# Patient Record
Sex: MALE | Race: WHITE | NOT HISPANIC OR LATINO | Employment: OTHER | URBAN - METROPOLITAN AREA
[De-identification: names, ages, dates, MRNs, and addresses within clinical notes are randomized per-mention and may not be internally consistent; named-entity substitution may affect disease eponyms.]

---

## 2017-02-08 ENCOUNTER — PATIENT MESSAGE (OUTPATIENT)
Dept: PULMONOLOGY | Facility: CLINIC | Age: 67
End: 2017-02-08

## 2017-04-08 RX ORDER — SIMVASTATIN 40 MG/1
TABLET, FILM COATED ORAL
Qty: 90 TABLET | Refills: 2 | Status: SHIPPED | OUTPATIENT
Start: 2017-04-08 | End: 2018-03-15 | Stop reason: SDUPTHER

## 2017-04-30 DIAGNOSIS — N52.1 ERECTILE DYSFUNCTION DUE TO DISEASES CLASSIFIED ELSEWHERE: ICD-10-CM

## 2017-04-30 RX ORDER — TADALAFIL 5 MG/1
TABLET, FILM COATED ORAL
Qty: 24 TABLET | Refills: 2 | Status: SHIPPED | OUTPATIENT
Start: 2017-04-30 | End: 2018-03-15 | Stop reason: SDUPTHER

## 2017-05-23 DIAGNOSIS — Z00.00 ROUTINE GENERAL MEDICAL EXAMINATION AT A HEALTH CARE FACILITY: Primary | ICD-10-CM

## 2017-06-07 ENCOUNTER — CLINICAL SUPPORT (OUTPATIENT)
Dept: INTERNAL MEDICINE | Facility: CLINIC | Age: 67
End: 2017-06-07
Payer: COMMERCIAL

## 2017-06-07 ENCOUNTER — CLINICAL SUPPORT (OUTPATIENT)
Dept: INFECTIOUS DISEASES | Facility: CLINIC | Age: 67
End: 2017-06-07
Payer: COMMERCIAL

## 2017-06-07 ENCOUNTER — OFFICE VISIT (OUTPATIENT)
Dept: PULMONOLOGY | Facility: CLINIC | Age: 67
End: 2017-06-07
Payer: COMMERCIAL

## 2017-06-07 ENCOUNTER — HOSPITAL ENCOUNTER (OUTPATIENT)
Dept: CARDIOLOGY | Facility: CLINIC | Age: 67
Discharge: HOME | End: 2017-06-07
Payer: COMMERCIAL

## 2017-06-07 VITALS
WEIGHT: 256 LBS | SYSTOLIC BLOOD PRESSURE: 127 MMHG | DIASTOLIC BLOOD PRESSURE: 80 MMHG | HEART RATE: 76 BPM | HEIGHT: 70 IN | BODY MASS INDEX: 36.65 KG/M2

## 2017-06-07 DIAGNOSIS — Z00.00 ROUTINE GENERAL MEDICAL EXAMINATION AT A HEALTH CARE FACILITY: Primary | ICD-10-CM

## 2017-06-07 DIAGNOSIS — Z00.00 ROUTINE GENERAL MEDICAL EXAMINATION AT A HEALTH CARE FACILITY: ICD-10-CM

## 2017-06-07 DIAGNOSIS — Z00.00 ANNUAL PHYSICAL EXAM: Primary | ICD-10-CM

## 2017-06-07 LAB
ALBUMIN SERPL BCP-MCNC: 4.1 G/DL
ALP SERPL-CCNC: 72 U/L
ALT SERPL W/O P-5'-P-CCNC: 22 U/L
ANION GAP SERPL CALC-SCNC: 13 MMOL/L
AST SERPL-CCNC: 18 U/L
BILIRUB SERPL-MCNC: 0.5 MG/DL
BUN SERPL-MCNC: 15 MG/DL
CALCIUM SERPL-MCNC: 10.3 MG/DL
CHLORIDE SERPL-SCNC: 103 MMOL/L
CHOLEST/HDLC SERPL: 3.5 {RATIO}
CO2 SERPL-SCNC: 30 MMOL/L
COMPLEXED PSA SERPL-MCNC: <0.01 NG/ML
CREAT SERPL-MCNC: 1.2 MG/DL
ERYTHROCYTE [DISTWIDTH] IN BLOOD BY AUTOMATED COUNT: 12.6 %
EST. GFR  (AFRICAN AMERICAN): >60 ML/MIN/1.73 M^2
EST. GFR  (NON AFRICAN AMERICAN): >60 ML/MIN/1.73 M^2
ESTIMATED AVG GLUCOSE: 111 MG/DL
GLUCOSE SERPL-MCNC: 118 MG/DL
HBA1C MFR BLD HPLC: 5.5 %
HCT VFR BLD AUTO: 46.2 %
HDL/CHOLESTEROL RATIO: 28.4 %
HDLC SERPL-MCNC: 190 MG/DL
HDLC SERPL-MCNC: 54 MG/DL
HGB BLD-MCNC: 15.4 G/DL
LDLC SERPL CALC-MCNC: 111.2 MG/DL
MCH RBC QN AUTO: 31.4 PG
MCHC RBC AUTO-ENTMCNC: 33.3 %
MCV RBC AUTO: 94 FL
NONHDLC SERPL-MCNC: 136 MG/DL
PLATELET # BLD AUTO: 243 K/UL
PMV BLD AUTO: 10.1 FL
POTASSIUM SERPL-SCNC: 3.9 MMOL/L
PROT SERPL-MCNC: 7.6 G/DL
RBC # BLD AUTO: 4.91 M/UL
SODIUM SERPL-SCNC: 146 MMOL/L
TRIGL SERPL-MCNC: 124 MG/DL
TSH SERPL DL<=0.005 MIU/L-ACNC: 1.92 UIU/ML
WBC # BLD AUTO: 9.23 K/UL

## 2017-06-07 PROCEDURE — 93000 ELECTROCARDIOGRAM COMPLETE: CPT | Mod: S$GLB,,, | Performed by: INTERNAL MEDICINE

## 2017-06-07 PROCEDURE — 90471 IMMUNIZATION ADMIN: CPT | Mod: S$GLB,,, | Performed by: INTERNAL MEDICINE

## 2017-06-07 PROCEDURE — 84153 ASSAY OF PSA TOTAL: CPT

## 2017-06-07 PROCEDURE — 80061 LIPID PANEL: CPT

## 2017-06-07 PROCEDURE — 85027 COMPLETE CBC AUTOMATED: CPT

## 2017-06-07 PROCEDURE — 99999 PR PBB SHADOW E&M-EST. PATIENT-LVL III: CPT | Mod: PBBFAC,,, | Performed by: INTERNAL MEDICINE

## 2017-06-07 PROCEDURE — 99999 PR PBB SHADOW E&M-EST. PATIENT-LVL III: CPT | Mod: PBBFAC,,,

## 2017-06-07 PROCEDURE — 90715 TDAP VACCINE 7 YRS/> IM: CPT | Mod: S$GLB,,, | Performed by: INTERNAL MEDICINE

## 2017-06-07 PROCEDURE — 84443 ASSAY THYROID STIM HORMONE: CPT

## 2017-06-07 PROCEDURE — 80053 COMPREHEN METABOLIC PANEL: CPT

## 2017-06-07 PROCEDURE — 83036 HEMOGLOBIN GLYCOSYLATED A1C: CPT

## 2017-06-07 PROCEDURE — 99397 PER PM REEVAL EST PAT 65+ YR: CPT | Mod: S$GLB,,, | Performed by: INTERNAL MEDICINE

## 2017-06-07 RX ORDER — DESVENLAFAXINE 100 MG/1
1 TABLET, EXTENDED RELEASE ORAL DAILY
COMMUNITY
Start: 2017-06-06 | End: 2019-05-01

## 2017-06-07 NOTE — LETTER
June 7, 2017    Adolfo Vazquez  [address] [place] [address]             [facility]  [address]  [facility] [address]  Phone: [phone] Dear Adolfo,      Thank you for allowing me to serve you and perform your Executive Health exam on 6/7/2017. This letter will serve as a brief summary of the physical findings and laboratory/studies performed and recommendations at this time.  At this time except for your weight and a very mild elevation of the blood sugar at 118, this is a normal exam. Your goal before next year's visit is to weigh the same as the day you got !        If you have any questions or concerns, please don't hesitate to call.    Sincerely,        Rigo Paul MD

## 2017-06-07 NOTE — PROGRESS NOTES
Subjective:       Patient ID: Adolfo Vazquez is a 66 y.o. male.    Chief Complaint: Annual Exam    HPI  65 yo  who works for [employer] comes for his periodic health exam. He has no active complaints. He has had no significant change in his weight.  He is several anti depressants and doing well. He had prostate cancer and had surgery at [facility] with good results. No additional treatment needed. Needs to lose weight, has lost 3 pounds since last year. At this rate, he will be 130 when he reaches his ideal weight!!   Review of Systems   Constitutional: Positive for fatigue.   HENT: Negative.    Eyes: Negative.    Respiratory: Positive for shortness of breath.    Cardiovascular: Negative.    Gastrointestinal: Negative.    Genitourinary: Negative.         S/P Prostate cancer surgery   Musculoskeletal: Negative.    Skin: Negative.    Neurological: Negative.    Psychiatric/Behavioral: Positive for behavioral problems.   All other systems reviewed and are negative.      Objective:      Physical Exam   Constitutional: He is oriented to person, place, and time. He appears well-developed and well-nourished.   Obese: BMI35   HENT:   Head: Normocephalic and atraumatic.   Right Ear: External ear normal.   Left Ear: External ear normal.   Eyes: Conjunctivae and EOM are normal. Pupils are equal, round, and reactive to light.   Neck: Normal range of motion. Neck supple.   Cardiovascular: Normal rate, regular rhythm and normal heart sounds.    Pulmonary/Chest: Effort normal and breath sounds normal.   Abdominal: Soft. Bowel sounds are normal.   Musculoskeletal: Normal range of motion.   Neurological: He is alert and oriented to person, place, and time. He has normal reflexes.   Skin: Skin is warm and dry.   Psychiatric: He has a normal mood and affect. His behavior is normal. Judgment and thought content normal.       Assessment:       No diagnosis found.    Plan:        IMP: Glucose: 118, all other parameters are  normal EKG is normal. IMP Exogenous Obesity with carbohydrate intolerance.

## 2017-06-30 ENCOUNTER — PATIENT MESSAGE (OUTPATIENT)
Dept: PULMONOLOGY | Facility: CLINIC | Age: 67
End: 2017-06-30

## 2017-07-19 DIAGNOSIS — M10.00 ACUTE IDIOPATHIC GOUT, UNSPECIFIED SITE: Primary | ICD-10-CM

## 2017-07-19 DIAGNOSIS — E05.90 HYPERTHYROIDISM: ICD-10-CM

## 2017-07-19 RX ORDER — LEVOTHYROXINE SODIUM 100 UG/1
TABLET ORAL
Qty: 90 TABLET | Refills: 3 | Status: SHIPPED | OUTPATIENT
Start: 2017-07-19 | End: 2018-07-30 | Stop reason: SDUPTHER

## 2017-07-20 DIAGNOSIS — M10.9 GOUT OF LEFT FOOT, UNSPECIFIED CAUSE, UNSPECIFIED CHRONICITY: Primary | ICD-10-CM

## 2017-07-20 RX ORDER — INDOMETHACIN 50 MG/1
50 CAPSULE ORAL
Qty: 21 CAPSULE | Refills: 1 | Status: SHIPPED | OUTPATIENT
Start: 2017-07-20 | End: 2020-10-05 | Stop reason: CLARIF

## 2017-09-22 ENCOUNTER — TELEPHONE (OUTPATIENT)
Dept: PULMONOLOGY | Facility: CLINIC | Age: 67
End: 2017-09-22

## 2017-09-22 DIAGNOSIS — R05.9 COUGH: Primary | ICD-10-CM

## 2017-09-22 RX ORDER — METHYLPREDNISOLONE 4 MG/1
TABLET ORAL
Qty: 1 PACKAGE | Refills: 1 | Status: SHIPPED | OUTPATIENT
Start: 2017-09-22 | End: 2017-10-13

## 2017-09-22 NOTE — TELEPHONE ENCOUNTER
----- Message from Deborah Soler sent at 9/22/2017  8:02 AM CDT -----  Contact: Self   [phone]  [place]  /  Patient called requesting a same day appt ,   Pt stated that he is experiencing chest congestion, constant cough, running nose, ...   Patient can be reached at [phone]  Thanks,

## 2017-09-22 NOTE — TELEPHONE ENCOUNTER
Dr Paul escribed a medrol dosepak to Mr Arri[facility]. I called the patient to let him know this. Mr Vazquez stated the symptoms came on quickly and his cough was keeping him awake. I let him know the Medrol will help with this. Pt will call back if there is no improvement. Pippa Will LPN.

## 2017-10-12 DIAGNOSIS — I10 ESSENTIAL HYPERTENSION: ICD-10-CM

## 2017-10-12 RX ORDER — LOSARTAN POTASSIUM AND HYDROCHLOROTHIAZIDE 12.5; 5 MG/1; MG/1
TABLET ORAL
Qty: 90 TABLET | Refills: 3 | Status: SHIPPED | OUTPATIENT
Start: 2017-10-12 | End: 2018-11-15 | Stop reason: SDUPTHER

## 2018-03-15 DIAGNOSIS — E78.5 HYPERLIPIDEMIA, UNSPECIFIED HYPERLIPIDEMIA TYPE: Primary | ICD-10-CM

## 2018-03-15 DIAGNOSIS — N52.1 ERECTILE DYSFUNCTION DUE TO DISEASES CLASSIFIED ELSEWHERE: ICD-10-CM

## 2018-03-15 RX ORDER — TADALAFIL 5 MG/1
5 TABLET ORAL NIGHTLY
Qty: 40 TABLET | Refills: 3 | Status: SHIPPED | OUTPATIENT
Start: 2018-03-15 | End: 2020-10-05 | Stop reason: CLARIF

## 2018-03-15 RX ORDER — SIMVASTATIN 40 MG/1
40 TABLET, FILM COATED ORAL NIGHTLY
Qty: 90 TABLET | Refills: 2 | Status: SHIPPED | OUTPATIENT
Start: 2018-03-15 | End: 2018-12-22 | Stop reason: SDUPTHER

## 2018-05-16 DIAGNOSIS — Z13.6 SCREENING FOR ISCHEMIC HEART DISEASE: Primary | ICD-10-CM

## 2018-05-16 DIAGNOSIS — R06.00 DYSPNEA, UNSPECIFIED TYPE: ICD-10-CM

## 2018-06-06 ENCOUNTER — OFFICE VISIT (OUTPATIENT)
Dept: PULMONOLOGY | Facility: CLINIC | Age: 68
End: 2018-06-06
Payer: MEDICARE

## 2018-06-06 ENCOUNTER — HOSPITAL ENCOUNTER (OUTPATIENT)
Dept: CARDIOLOGY | Facility: CLINIC | Age: 68
Discharge: HOME | End: 2018-06-06
Payer: MEDICARE

## 2018-06-06 ENCOUNTER — CLINICAL SUPPORT (OUTPATIENT)
Dept: INTERNAL MEDICINE | Facility: CLINIC | Age: 68
End: 2018-06-06
Payer: MEDICARE

## 2018-06-06 VITALS
HEART RATE: 62 BPM | BODY MASS INDEX: 37.22 KG/M2 | DIASTOLIC BLOOD PRESSURE: 88 MMHG | SYSTOLIC BLOOD PRESSURE: 161 MMHG | WEIGHT: 260 LBS | HEIGHT: 70 IN

## 2018-06-06 DIAGNOSIS — Z13.6 SCREENING FOR ISCHEMIC HEART DISEASE: ICD-10-CM

## 2018-06-06 DIAGNOSIS — Z00.00 ANNUAL PHYSICAL EXAM: Primary | ICD-10-CM

## 2018-06-06 DIAGNOSIS — Z12.5 SPECIAL SCREENING FOR MALIGNANT NEOPLASM OF PROSTATE: ICD-10-CM

## 2018-06-06 DIAGNOSIS — E03.9 PRIMARY HYPOTHYROIDISM: ICD-10-CM

## 2018-06-06 DIAGNOSIS — E11.9 DIABETES MELLITUS WITHOUT COMPLICATION: ICD-10-CM

## 2018-06-06 DIAGNOSIS — D64.9 ANEMIA, UNSPECIFIED TYPE: Primary | ICD-10-CM

## 2018-06-06 DIAGNOSIS — E78.5 HYPERLIPIDEMIA, UNSPECIFIED HYPERLIPIDEMIA TYPE: ICD-10-CM

## 2018-06-06 DIAGNOSIS — R94.30 ABNORMAL CARDIAC FUNCTION TEST: Primary | ICD-10-CM

## 2018-06-06 LAB
ALBUMIN SERPL BCP-MCNC: 3.9 G/DL
ALP SERPL-CCNC: 66 U/L
ALT SERPL W/O P-5'-P-CCNC: 21 U/L
ANION GAP SERPL CALC-SCNC: 9 MMOL/L
AST SERPL-CCNC: 18 U/L
BILIRUB SERPL-MCNC: 0.5 MG/DL
BUN SERPL-MCNC: 15 MG/DL
CALCIUM SERPL-MCNC: 9.4 MG/DL
CHLORIDE SERPL-SCNC: 107 MMOL/L
CHOLEST SERPL-MCNC: 188 MG/DL
CHOLEST/HDLC SERPL: 3.4 {RATIO}
CO2 SERPL-SCNC: 26 MMOL/L
COMPLEXED PSA SERPL-MCNC: <0.01 NG/ML
CREAT SERPL-MCNC: 0.8 MG/DL
DIASTOLIC DYSFUNCTION: NO
ERYTHROCYTE [DISTWIDTH] IN BLOOD BY AUTOMATED COUNT: 12.4 %
EST. GFR  (AFRICAN AMERICAN): >60 ML/MIN/1.73 M^2
EST. GFR  (NON AFRICAN AMERICAN): >60 ML/MIN/1.73 M^2
ESTIMATED AVG GLUCOSE: 105 MG/DL
GLUCOSE SERPL-MCNC: 101 MG/DL
HBA1C MFR BLD HPLC: 5.3 %
HCT VFR BLD AUTO: 41 %
HDLC SERPL-MCNC: 55 MG/DL
HDLC SERPL: 29.3 %
HGB BLD-MCNC: 13.7 G/DL
LDLC SERPL CALC-MCNC: 103.4 MG/DL
MCH RBC QN AUTO: 31.6 PG
MCHC RBC AUTO-ENTMCNC: 33.4 G/DL
MCV RBC AUTO: 95 FL
NONHDLC SERPL-MCNC: 133 MG/DL
PLATELET # BLD AUTO: 238 K/UL
PMV BLD AUTO: 10.1 FL
POTASSIUM SERPL-SCNC: 4.2 MMOL/L
PROT SERPL-MCNC: 7 G/DL
RBC # BLD AUTO: 4.33 M/UL
SODIUM SERPL-SCNC: 142 MMOL/L
TRIGL SERPL-MCNC: 148 MG/DL
TSH SERPL DL<=0.005 MIU/L-ACNC: 1.46 UIU/ML
WBC # BLD AUTO: 8 K/UL

## 2018-06-06 PROCEDURE — 93018 CV STRESS TEST I&R ONLY: CPT | Mod: S$PBB,,, | Performed by: INTERNAL MEDICINE

## 2018-06-06 PROCEDURE — 84443 ASSAY THYROID STIM HORMONE: CPT

## 2018-06-06 PROCEDURE — 85027 COMPLETE CBC AUTOMATED: CPT

## 2018-06-06 PROCEDURE — 80061 LIPID PANEL: CPT

## 2018-06-06 PROCEDURE — 93017 CV STRESS TEST TRACING ONLY: CPT | Mod: PBBFAC | Performed by: INTERNAL MEDICINE

## 2018-06-06 PROCEDURE — 80053 COMPREHEN METABOLIC PANEL: CPT

## 2018-06-06 PROCEDURE — 84153 ASSAY OF PSA TOTAL: CPT

## 2018-06-06 PROCEDURE — 99387 INIT PM E/M NEW PAT 65+ YRS: CPT | Mod: S$PBB,,, | Performed by: INTERNAL MEDICINE

## 2018-06-06 PROCEDURE — 99999 PR PBB SHADOW E&M-EST. PATIENT-LVL III: CPT | Mod: PBBFAC,,, | Performed by: INTERNAL MEDICINE

## 2018-06-06 PROCEDURE — 83036 HEMOGLOBIN GLYCOSYLATED A1C: CPT

## 2018-06-06 PROCEDURE — 93016 CV STRESS TEST SUPVJ ONLY: CPT | Mod: S$PBB,,, | Performed by: INTERNAL MEDICINE

## 2018-06-06 PROCEDURE — 99213 OFFICE O/P EST LOW 20 MIN: CPT | Mod: PBBFAC,25 | Performed by: INTERNAL MEDICINE

## 2018-06-06 RX ORDER — ZOLPIDEM TARTRATE 10 MG/1
1 TABLET ORAL NIGHTLY PRN
COMMUNITY
Start: 2018-03-02 | End: 2020-10-15

## 2018-06-06 NOTE — LETTER
"June 7, 2018    Adolfo Vazquez  [address] [place] [address]             [facility]  [address]  [facility] [address]  Phone: [phone] Dear Adolfo        Thank you for allowing me to serve you and perform your Executive Health exam on 6/6/2018. This letter will serve as a brief summary of the physical findings and laboratory/studies performed and recommendations at this time. Except for your weight, this is a normal study. You had a "false positive" screening stress but the follow up study is normal, talk to [place] about the merits of a sleep mask.    Need to address the weight before the knees and hips begin to wear out.         If you have any questions or concerns, please don't hesitate to call.    Sincerely,        Rigo Paul MD     "

## 2018-06-07 ENCOUNTER — DOCUMENTATION ONLY (OUTPATIENT)
Dept: CARDIOLOGY | Facility: CLINIC | Age: 68
End: 2018-06-07

## 2018-06-07 ENCOUNTER — HOSPITAL ENCOUNTER (OUTPATIENT)
Dept: CARDIOLOGY | Facility: CLINIC | Age: 68
Discharge: HOME | End: 2018-06-07
Attending: INTERNAL MEDICINE
Payer: MEDICARE

## 2018-06-07 DIAGNOSIS — R94.30 ABNORMAL CARDIAC FUNCTION TEST: ICD-10-CM

## 2018-06-07 LAB
AORTIC VALVE REGURGITATION: NORMAL
ESTIMATED PA SYSTOLIC PRESSURE: 25.6
MITRAL VALVE MOBILITY: NORMAL
MITRAL VALVE REGURGITATION: NORMAL
RETIRED EF AND QEF - SEE NOTES: 55 (ref 55–65)
TRICUSPID VALVE REGURGITATION: NORMAL

## 2018-06-07 PROCEDURE — 93351 STRESS TTE COMPLETE: CPT | Mod: 26,S$PBB,, | Performed by: INTERNAL MEDICINE

## 2018-06-07 PROCEDURE — 93325 DOPPLER ECHO COLOR FLOW MAPG: CPT | Mod: 26,S$PBB,, | Performed by: INTERNAL MEDICINE

## 2018-06-07 PROCEDURE — C8930 TTE W OR W/O CONTR, CONT ECG: HCPCS | Mod: PBBFAC | Performed by: INTERNAL MEDICINE

## 2018-06-07 PROCEDURE — 93320 DOPPLER ECHO COMPLETE: CPT | Mod: 26,S$PBB,, | Performed by: INTERNAL MEDICINE

## 2018-06-07 NOTE — PROGRESS NOTES
Subjective:       Patient ID: Adolfo Vazquez is a 67 y.o. male.    Chief Complaint: Annual Exam    HPI  68 yo in [place]  for [employer] comes for his periodic health exam.  He feels well, is markedly overweight with a BMI of 37. He has no active medical complaints today. He has done well since prostatectomy for cancer several years  Ago. He is a perfect candidate for obstructive sleep apnea and need a sleep test. Will need a screening colonoscope, last study October, 2010.  Review of Systems   Constitutional: Negative.    HENT: Negative.    Eyes: Negative.    Respiratory: Positive for shortness of breath.    Cardiovascular: Negative.    Gastrointestinal: Negative.    Genitourinary: Negative.         S/P Radical prostate surgery   Musculoskeletal: Negative.    Skin: Negative.    Neurological: Negative.    Psychiatric/Behavioral: Negative.    All other systems reviewed and are negative.      Objective:      Physical Exam   Constitutional: He is oriented to person, place, and time. He appears well-developed and well-nourished.   Obese: BMI 37   HENT:   Head: Normocephalic and atraumatic.   Right Ear: External ear normal.   Left Ear: External ear normal.   Eyes: Conjunctivae and EOM are normal. Pupils are equal, round, and reactive to light.   Neck: Normal range of motion. Neck supple.   Cardiovascular: Normal rate, regular rhythm and normal heart sounds.    Pulmonary/Chest: Effort normal and breath sounds normal.   Abdominal: Soft. Bowel sounds are normal.   Musculoskeletal: Normal range of motion.   Neurological: He is alert and oriented to person, place, and time. He has normal reflexes.   Skin: Skin is warm and dry.   Psychiatric: He has a normal mood and affect. His behavior is normal. Judgment and thought content normal.       Assessment:       No diagnosis found.    Plan:        Labs: Essentially normal in all respects and unchanged from a  Year ago.  He has a stress EKG that was positive for  ischemia. A repeat Stress 2-D Echo is negative for ischemia at a low workload--76% of the maximal heart rate.  No evidence of stress induced wall motion abnormality. IMP: Obese Male

## 2018-06-08 DIAGNOSIS — Z12.11 SPECIAL SCREENING FOR MALIGNANT NEOPLASMS, COLON: Primary | ICD-10-CM

## 2018-07-21 DIAGNOSIS — E05.90 HYPERTHYROIDISM: ICD-10-CM

## 2018-07-30 DIAGNOSIS — E05.90 HYPERTHYROIDISM: ICD-10-CM

## 2018-07-30 RX ORDER — LEVOTHYROXINE SODIUM 100 UG/1
100 TABLET ORAL EVERY MORNING
Qty: 90 TABLET | Refills: 3 | Status: SHIPPED | OUTPATIENT
Start: 2018-07-30 | End: 2019-05-28 | Stop reason: SDUPTHER

## 2018-09-03 ENCOUNTER — OFFICE VISIT (OUTPATIENT)
Dept: URGENT CARE | Facility: CLINIC | Age: 68
End: 2018-09-03
Payer: MEDICARE

## 2018-09-03 VITALS
TEMPERATURE: 100 F | HEART RATE: 80 BPM | BODY MASS INDEX: 36.51 KG/M2 | RESPIRATION RATE: 20 BRPM | HEIGHT: 70 IN | DIASTOLIC BLOOD PRESSURE: 80 MMHG | SYSTOLIC BLOOD PRESSURE: 118 MMHG | OXYGEN SATURATION: 96 % | WEIGHT: 255 LBS

## 2018-09-03 DIAGNOSIS — M19.079 INFLAMMATION OF FOOT JOINT: Primary | ICD-10-CM

## 2018-09-03 PROCEDURE — 99203 OFFICE O/P NEW LOW 30 MIN: CPT | Mod: S$GLB,,, | Performed by: PHYSICIAN ASSISTANT

## 2018-09-03 RX ORDER — FLUOROURACIL 50 MG/G
CREAM TOPICAL
COMMUNITY
Start: 2018-06-15 | End: 2019-05-01 | Stop reason: ALTCHOICE

## 2018-09-03 RX ORDER — METHYLPREDNISOLONE 4 MG/1
TABLET ORAL
Qty: 1 PACKAGE | Refills: 0 | Status: SHIPPED | OUTPATIENT
Start: 2018-09-03 | End: 2019-05-01 | Stop reason: ALTCHOICE

## 2018-09-03 NOTE — PROGRESS NOTES
"Subjective:       Patient ID: Adolfo Vazquez is a 67 y.o. male.    Vitals:  height is 5' 10" (1.778 m) and weight is 115.7 kg (255 lb). His oral temperature is 99.9 °F (37.7 °C). His blood pressure is 118/80 and his pulse is 80. His respiration is 20 and oxygen saturation is 96%.     Chief Complaint: Foot Pain (Right Foot)    This is a 67 y.o. male who presents today with a chief complaint of right foot and ankle swelling/pain for 6 days.  Patient states its tendonitis .  He has had this before.  Patient denies trauma to the foot.        Foot Pain   This is a new problem. The current episode started in the past 7 days (6 days ago). The problem occurs constantly. The problem has been gradually worsening. Associated symptoms include joint swelling. Pertinent negatives include no abdominal pain, chest pain, chills, fever, headaches, nausea, rash, sore throat or vomiting. Associated symptoms comments: Right ankle and right foot swollen. The symptoms are aggravated by walking. Treatments tried: Ibuprofen. The treatment provided mild relief.     Review of Systems   Constitution: Negative for chills and fever.   HENT: Negative for sore throat.    Eyes: Negative for blurred vision.   Cardiovascular: Negative for chest pain.   Respiratory: Negative for shortness of breath.    Skin: Negative for rash.   Musculoskeletal: Positive for joint pain and joint swelling. Negative for back pain and gout.        Foot Pain   Gastrointestinal: Negative for abdominal pain, diarrhea, nausea and vomiting.   Neurological: Negative for headaches.   Psychiatric/Behavioral: The patient is not nervous/anxious.        Objective:      Physical Exam   Constitutional: He is oriented to person, place, and time. He appears well-developed and well-nourished. No distress.   HENT:   Head: Normocephalic and atraumatic.   Eyes: Conjunctivae are normal.   Neck: Normal range of motion. Neck supple.   Cardiovascular: Normal rate and regular rhythm. Exam " reveals no gallop and no friction rub.   No murmur heard.  Pulmonary/Chest: Effort normal and breath sounds normal. He has no wheezes. He has no rales.   Musculoskeletal:        Right foot: There is decreased range of motion, tenderness (1st MTP joint and below the lateral malleolus) and swelling.   Neurological: He is alert and oriented to person, place, and time.   Skin: Skin is warm and dry. No rash noted. No erythema.   Psychiatric: He has a normal mood and affect. His behavior is normal. Judgment and thought content normal.   Nursing note and vitals reviewed.      Assessment:       1. Inflammation of foot joint        Plan:         Inflammation of foot joint  -     methylPREDNISolone (MEDROL DOSEPACK) 4 mg tablet; use as directed  Dispense: 1 Package; Refill: 0        Adolfo was seen today for foot pain.    Diagnoses and all orders for this visit:    Inflammation of foot joint  -     methylPREDNISolone (MEDROL DOSEPACK) 4 mg tablet; use as directed      Patient Instructions     - Rest.    - Drink plenty of fluids.    - Tylenol or Ibuprofen as directed as needed for fever/pain.    - Ice for the next 24-48 hours.    - Elevate when possible.    - Follow up with your PCP or specialty clinic as directed in the next 1-2 weeks if not improved or as needed.  You can call [phone] to schedule an appointment with the appropriate provider.    - Go to the ED if your symptoms worsen.  - You must understand that you have received an Urgent Care treatment only and that you may be released before all of your medical problems are known or treated.   - You, the patient, will arrange for follow up care as instructed.   - If your condition worsens or fails to improve we recommend that you receive another evaluation at the ER immediately or contact your PCP to discuss your concerns or return here.      Osteoarthritis  Osteoarthritis (also called degenerative joint disease) happens when the cartilage in a joint becomes damaged  and worn. This may be due to age, wear and tear, overuse of the joint, or other problems. Osteoarthritis can affect any joint. But it is most common in hands, knees, spine, hips, and feet. Symptoms include joint stiffness, pain, and swelling.  Home care  · When a joint is more sore than usual, rest it for a day or two.  · Heat can help relieve stiffness. Take a hot bath or apply a heating pad for up to 30 minutes at a time. If symptoms are worse in the morning, using heat just after awakening can help relax the muscle and soothe the joints.   · Ice helps relieve pain and swelling. It is often used after activity. Use a cold pack wrapped in a thin cloth on the joint for 10 to 15 minutes at a time.   · Alternating hot and cold can also help relieve pain. Try this for 20 minutes at a time, several times per day.  · Exercise helps prevent the muscles and ligaments around the joint from becoming weak. It also helps maintain function in the joint.  Be as active as you can. Talk to your healthcare provider about what activity program is best for you.  · Excess weight puts a lot of extra strain on weight-bearing joints of the lower back, hips, knees, feet and ankles. If you are overweight, talk to your healthcare provider about a safe and effective weight loss program.  · Use anti-inflammatory medicines as prescribed for pain. This includes acetaminophen or NSAIDs such as ibuprofen or naproxen. If needed, topical or injected medicines may be recommended. Talk to your healthcare provider if these options are not enough to manage your pain.  · Talk with your healthcare provider about devices that might help improve your function and reduce pain.  Follow-up care  Follow up with your healthcare provider as advised by our staff.  When to seek medical advice  Call your healthcare provider right away if any of these occur:  · Redness or swelling of a painful joint  · Discharge or pus from a painful joint  · Fever of 100.4ºF  (38ºC) or higher, or as directed by your healthcare provider  · Worsening joint pain  · Decreased ability to move the joint or bear weight on the joint  Date Last Reviewed: 3/1/2017  © 7411-7454 [employer]. [address] [place] [address], [place] [address]. All rights reserved. This information is not intended as a substitute for professional medical care. Always follow your healthcare professional's instructions.        What Is Tendonitis of the Foot?  When you use a set of muscles too much, youre likely to strain the tendons (soft tissues) that connect those muscles to your bones. At first, pain or swelling may come and go quickly. But if you do too much too soon, your muscles may overtire again. The strain may cause a tendons outer covering to swell or small fibers in a tendon to pull apart. If you keep pushing your muscles, damage to the tendons adds up, and tendonitis develops. Over time, pain and swelling may limit your activities. But with your doctors help, tendonitis can be controlled. Both your symptoms and your risk of future problems including tendon rupture can be reduced.       The back of your foot  The Achilles tendon connects the calf muscle to the heel bone. If tendonitis occurs here, you may feel pain when your foot touches down or when your heel lifts off the ground.   The front of your foot  The anterior tibial tendon helps control the front of your foot when it meets the ground. If this tendon is strained, you may feel pain when you go down stairs or walk or run on hills.     The inside of your foot  The posterior tibial tendon runs along the inside of the ankle and foot. If this tendon is strained, your foot may hurt when it moves forward to push off the ground. Or you may feel pain when your heel shifts from side to side.   The outside of your foot  The peroneal tendon wraps across the bottom of your foot, from the outside to the inside. Tendonitis here may cause pain when you stand  or push off the ground and when walking on uneven surfaces.   Date Last Reviewed: 9/21/2015  © 6174-1435 [employer]. [address] [place] [address], [place] [address]. All rights reserved. This information is not intended as a substitute for professional medical care. Always follow your healthcare professional's instructions.

## 2018-09-03 NOTE — PATIENT INSTRUCTIONS
- Rest.    - Drink plenty of fluids.    - Tylenol or Ibuprofen as directed as needed for fever/pain.    - Ice for the next 24-48 hours.    - Elevate when possible.    - Follow up with your PCP or specialty clinic as directed in the next 1-2 weeks if not improved or as needed.  You can call [phone] to schedule an appointment with the appropriate provider.    - Go to the ED if your symptoms worsen.  - You must understand that you have received an Urgent Care treatment only and that you may be released before all of your medical problems are known or treated.   - You, the patient, will arrange for follow up care as instructed.   - If your condition worsens or fails to improve we recommend that you receive another evaluation at the ER immediately or contact your PCP to discuss your concerns or return here.      Osteoarthritis  Osteoarthritis (also called degenerative joint disease) happens when the cartilage in a joint becomes damaged and worn. This may be due to age, wear and tear, overuse of the joint, or other problems. Osteoarthritis can affect any joint. But it is most common in hands, knees, spine, hips, and feet. Symptoms include joint stiffness, pain, and swelling.  Home care  · When a joint is more sore than usual, rest it for a day or two.  · Heat can help relieve stiffness. Take a hot bath or apply a heating pad for up to 30 minutes at a time. If symptoms are worse in the morning, using heat just after awakening can help relax the muscle and soothe the joints.   · Ice helps relieve pain and swelling. It is often used after activity. Use a cold pack wrapped in a thin cloth on the joint for 10 to 15 minutes at a time.   · Alternating hot and cold can also help relieve pain. Try this for 20 minutes at a time, several times per day.  · Exercise helps prevent the muscles and ligaments around the joint from becoming weak. It also helps maintain function in the joint.  Be as active as you can. Talk to your  healthcare provider about what activity program is best for you.  · Excess weight puts a lot of extra strain on weight-bearing joints of the lower back, hips, knees, feet and ankles. If you are overweight, talk to your healthcare provider about a safe and effective weight loss program.  · Use anti-inflammatory medicines as prescribed for pain. This includes acetaminophen or NSAIDs such as ibuprofen or naproxen. If needed, topical or injected medicines may be recommended. Talk to your healthcare provider if these options are not enough to manage your pain.  · Talk with your healthcare provider about devices that might help improve your function and reduce pain.  Follow-up care  Follow up with your healthcare provider as advised by our staff.  When to seek medical advice  Call your healthcare provider right away if any of these occur:  · Redness or swelling of a painful joint  · Discharge or pus from a painful joint  · Fever of 100.4ºF (38ºC) or higher, or as directed by your healthcare provider  · Worsening joint pain  · Decreased ability to move the joint or bear weight on the joint  Date Last Reviewed: 3/1/2017  © 5281-7902 [employer]. [address] [place] [address]. All rights reserved. This information is not intended as a substitute for professional medical care. Always follow your healthcare professional's instructions.        What Is Tendonitis of the Foot?  When you use a set of muscles too much, youre likely to strain the tendons (soft tissues) that connect those muscles to your bones. At first, pain or swelling may come and go quickly. But if you do too much too soon, your muscles may overtire again. The strain may cause a tendons outer covering to swell or small fibers in a tendon to pull apart. If you keep pushing your muscles, damage to the tendons adds up, and tendonitis develops. Over time, pain and swelling may limit your activities. But with your doctors help, tendonitis  can be controlled. Both your symptoms and your risk of future problems including tendon rupture can be reduced.       The back of your foot  The Achilles tendon connects the calf muscle to the heel bone. If tendonitis occurs here, you may feel pain when your foot touches down or when your heel lifts off the ground.   The front of your foot  The anterior tibial tendon helps control the front of your foot when it meets the ground. If this tendon is strained, you may feel pain when you go down stairs or walk or run on hills.     The inside of your foot  The posterior tibial tendon runs along the inside of the ankle and foot. If this tendon is strained, your foot may hurt when it moves forward to push off the ground. Or you may feel pain when your heel shifts from side to side.   The outside of your foot  The peroneal tendon wraps across the bottom of your foot, from the outside to the inside. Tendonitis here may cause pain when you stand or push off the ground and when walking on uneven surfaces.   Date Last Reviewed: 9/21/2015 © 2000-2017 [employer]. [address] [place] [address], [place] [address]. All rights reserved. This information is not intended as a substitute for professional medical care. Always follow your healthcare professional's instructions.

## 2018-09-15 RX ORDER — LEVOTHYROXINE SODIUM 100 UG/1
TABLET ORAL
Qty: 90 TABLET | Refills: 3 | Status: SHIPPED | OUTPATIENT
Start: 2018-09-15 | End: 2020-10-15

## 2018-09-28 ENCOUNTER — IMMUNIZATION (OUTPATIENT)
Dept: INTERNAL MEDICINE | Facility: CLINIC | Age: 68
End: 2018-09-28
Payer: MEDICARE

## 2018-09-28 PROCEDURE — 90662 IIV NO PRSV INCREASED AG IM: CPT | Mod: PBBFAC

## 2018-11-01 ENCOUNTER — PATIENT MESSAGE (OUTPATIENT)
Dept: PULMONOLOGY | Facility: CLINIC | Age: 68
End: 2018-11-01

## 2018-11-15 DIAGNOSIS — I10 ESSENTIAL HYPERTENSION: ICD-10-CM

## 2018-11-15 RX ORDER — LOSARTAN POTASSIUM AND HYDROCHLOROTHIAZIDE 12.5; 5 MG/1; MG/1
TABLET ORAL
Qty: 90 TABLET | Refills: 3 | Status: SHIPPED | OUTPATIENT
Start: 2018-11-15 | End: 2019-11-02 | Stop reason: SDUPTHER

## 2018-12-18 DIAGNOSIS — Z12.11 SPECIAL SCREENING FOR MALIGNANT NEOPLASMS, COLON: Primary | ICD-10-CM

## 2018-12-18 RX ORDER — SODIUM, POTASSIUM,MAG SULFATES 17.5-3.13G
1 SOLUTION, RECONSTITUTED, ORAL ORAL DAILY
Qty: 1 KIT | Refills: 0 | Status: SHIPPED | OUTPATIENT
Start: 2018-12-18 | End: 2018-12-21

## 2018-12-22 DIAGNOSIS — E78.5 HYPERLIPIDEMIA, UNSPECIFIED HYPERLIPIDEMIA TYPE: ICD-10-CM

## 2018-12-27 RX ORDER — SIMVASTATIN 40 MG/1
TABLET, FILM COATED ORAL
Qty: 90 TABLET | Refills: 2 | Status: SHIPPED | OUTPATIENT
Start: 2018-12-27 | End: 2019-11-02 | Stop reason: SDUPTHER

## 2019-01-24 ENCOUNTER — ANESTHESIA (OUTPATIENT)
Dept: ENDOSCOPY | Facility: HOSPITAL | Age: 69
End: 2019-01-24
Payer: MEDICARE

## 2019-01-24 ENCOUNTER — ANESTHESIA EVENT (OUTPATIENT)
Dept: ENDOSCOPY | Facility: HOSPITAL | Age: 69
End: 2019-01-24
Payer: MEDICARE

## 2019-01-24 ENCOUNTER — HOSPITAL ENCOUNTER (OUTPATIENT)
Facility: HOSPITAL | Age: 69
Discharge: HOME | End: 2019-01-24
Attending: COLON & RECTAL SURGERY | Admitting: COLON & RECTAL SURGERY
Payer: MEDICARE

## 2019-01-24 VITALS
BODY MASS INDEX: 36.51 KG/M2 | HEART RATE: 59 BPM | SYSTOLIC BLOOD PRESSURE: 114 MMHG | WEIGHT: 255 LBS | OXYGEN SATURATION: 95 % | DIASTOLIC BLOOD PRESSURE: 62 MMHG | TEMPERATURE: 98 F | RESPIRATION RATE: 18 BRPM | HEIGHT: 70 IN

## 2019-01-24 DIAGNOSIS — Z12.11 SCREENING FOR COLON CANCER: ICD-10-CM

## 2019-01-24 DIAGNOSIS — N52.9 ERECTILE DYSFUNCTION, UNSPECIFIED ERECTILE DYSFUNCTION TYPE: Primary | ICD-10-CM

## 2019-01-24 DIAGNOSIS — E78.00 HIGH CHOLESTEROL: ICD-10-CM

## 2019-01-24 PROCEDURE — 25000003 PHARM REV CODE 250: Performed by: NURSE PRACTITIONER

## 2019-01-24 PROCEDURE — E9220 PRA ENDO ANESTHESIA: HCPCS | Mod: ,,, | Performed by: NURSE ANESTHETIST, CERTIFIED REGISTERED

## 2019-01-24 PROCEDURE — 37000008 HC ANESTHESIA 1ST 15 MINUTES: Performed by: COLON & RECTAL SURGERY

## 2019-01-24 PROCEDURE — G0105 COLORECTAL SCRN; HI RISK IND: HCPCS | Performed by: COLON & RECTAL SURGERY

## 2019-01-24 PROCEDURE — G0105 COLORECTAL SCRN; HI RISK IND: ICD-10-PCS | Mod: ,,, | Performed by: COLON & RECTAL SURGERY

## 2019-01-24 PROCEDURE — 37000009 HC ANESTHESIA EA ADD 15 MINS: Performed by: COLON & RECTAL SURGERY

## 2019-01-24 PROCEDURE — E9220 PRA ENDO ANESTHESIA: ICD-10-PCS | Mod: ,,, | Performed by: NURSE ANESTHETIST, CERTIFIED REGISTERED

## 2019-01-24 PROCEDURE — G0105 COLORECTAL SCRN; HI RISK IND: HCPCS | Mod: ,,, | Performed by: COLON & RECTAL SURGERY

## 2019-01-24 PROCEDURE — 25000003 PHARM REV CODE 250: Performed by: NURSE ANESTHETIST, CERTIFIED REGISTERED

## 2019-01-24 PROCEDURE — 63600175 PHARM REV CODE 636 W HCPCS: Performed by: NURSE ANESTHETIST, CERTIFIED REGISTERED

## 2019-01-24 RX ORDER — PROPOFOL 10 MG/ML
VIAL (ML) INTRAVENOUS
Status: DISCONTINUED | OUTPATIENT
Start: 2019-01-24 | End: 2019-01-24

## 2019-01-24 RX ORDER — SODIUM CHLORIDE 9 MG/ML
INJECTION, SOLUTION INTRAVENOUS CONTINUOUS
Status: DISCONTINUED | OUTPATIENT
Start: 2019-01-24 | End: 2019-01-24 | Stop reason: HOSPADM

## 2019-01-24 RX ORDER — LIDOCAINE HCL/PF 100 MG/5ML
SYRINGE (ML) INTRAVENOUS
Status: DISCONTINUED | OUTPATIENT
Start: 2019-01-24 | End: 2019-01-24

## 2019-01-24 RX ORDER — PROPOFOL 10 MG/ML
VIAL (ML) INTRAVENOUS CONTINUOUS PRN
Status: DISCONTINUED | OUTPATIENT
Start: 2019-01-24 | End: 2019-01-24

## 2019-01-24 RX ORDER — LABETALOL HYDROCHLORIDE 5 MG/ML
INJECTION, SOLUTION INTRAVENOUS
Status: DISCONTINUED | OUTPATIENT
Start: 2019-01-24 | End: 2019-01-24

## 2019-01-24 RX ORDER — SODIUM CHLORIDE 0.9 % (FLUSH) 0.9 %
3 SYRINGE (ML) INJECTION
Status: DISCONTINUED | OUTPATIENT
Start: 2019-01-24 | End: 2019-01-24 | Stop reason: HOSPADM

## 2019-01-24 RX ADMIN — PROPOFOL 150 MCG/KG/MIN: 10 INJECTION, EMULSION INTRAVENOUS at 08:01

## 2019-01-24 RX ADMIN — PROPOFOL 70 MG: 10 INJECTION, EMULSION INTRAVENOUS at 08:01

## 2019-01-24 RX ADMIN — LIDOCAINE HYDROCHLORIDE 50 MG: 20 INJECTION, SOLUTION INTRAVENOUS at 08:01

## 2019-01-24 RX ADMIN — LABETALOL HYDROCHLORIDE 5 MG: 5 INJECTION, SOLUTION INTRAVENOUS at 09:01

## 2019-01-24 RX ADMIN — SODIUM CHLORIDE: 0.9 INJECTION, SOLUTION INTRAVENOUS at 09:01

## 2019-01-24 RX ADMIN — SODIUM CHLORIDE: 0.9 INJECTION, SOLUTION INTRAVENOUS at 08:01

## 2019-01-24 RX ADMIN — LABETALOL HYDROCHLORIDE 10 MG: 5 INJECTION, SOLUTION INTRAVENOUS at 09:01

## 2019-01-24 NOTE — TRANSFER OF CARE
"Anesthesia Transfer of Care Note    Patient: Adolfo Vazquez    Procedure(s) Performed: Procedure(s) (LRB):  COLONOSCOPY (N/A)    Patient location: PACU    Anesthesia Type: general    Transport from OR: Transported from OR on 6-10 L/min O2 by face mask with adequate spontaneous ventilation    Post pain: adequate analgesia    Post assessment: no apparent anesthetic complications and tolerated procedure well    Post vital signs: stable    Level of consciousness: responds to stimulation and sedated    Nausea/Vomiting: no nausea/vomiting    Complications: none    Transfer of care protocol was followed      Last vitals:   Visit Vitals  BP (!) 144/78 (BP Location: Left arm, Patient Position: Lying)   Pulse 69   Temp 36.7 °C (98.1 °F) (Skin)   Resp 18   Ht 5' 10" (1.778 m)   Wt 115.7 kg (255 lb)   SpO2 (!) 94%   BMI 36.59 kg/m²     "

## 2019-01-24 NOTE — [employer] PATIENT INSTRUCTIONS
Discharge Summary/Instructions after an Endoscopic Procedure  Patient Name: Adolfo Vazquez  Patient MRN: [id number]  Patient YOB: 1950  Thursday, January 24, 2019  Clifton Whiting MD  RESTRICTIONS:  During your procedure today, you received medications for sedation.  These   medications may affect your judgment, balance and coordination.  Therefore,   for 24 hours, you have the following restrictions:   - DO NOT drive a car, operate machinery, make legal/financial decisions,   sign important papers or drink alcohol.    ACTIVITY:  Today: no heavy lifting, straining or running due to procedural   sedation/anesthesia.  The following day: return to full activity including work.  DIET:  Eat and drink normally unless instructed otherwise.     TREATMENT FOR COMMON SIDE EFFECTS:  - Mild abdominal pain, nausea, belching, bloating or excessive gas:  rest,   eat lightly and use a heating pad.  - Sore Throat: treat with throat lozenges and/or gargle with warm salt   water.  - Because air was used during the procedure, expelling large amounts of air   from your rectum or belching is normal.  - If a bowel prep was taken, you may not have a bowel movement for 1-3 days.    This is normal.  SYMPTOMS TO WATCH FOR AND REPORT TO YOUR PHYSICIAN:  1. Abdominal pain or bloating, other than gas cramps.  2. Chest pain.  3. Back pain.  4. Signs of infection such as: chills or fever occurring within 24 hours   after the procedure.  5. Rectal bleeding, which would show as bright red, maroon, or black stools.   (A tablespoon of blood from the rectum is not serious, especially if   hemorrhoids are present.)  6. Vomiting.  7. Weakness or dizziness.  GO DIRECTLY TO THE NEAREST EMERGENCY ROOM IF YOU HAVE ANY OF THE FOLLOWING:      Difficulty breathing              Chills and/or fever over 101 F   Persistent vomiting and/or vomiting blood   Severe abdominal pain   Severe chest pain   Black, tarry stools   Bleeding- more than one  tablespoon   Any other symptom or condition that you feel may need urgent attention  Your doctor recommends these additional instructions:  If any biopsies were taken, your doctors clinic will contact you in 1 to 2   weeks with any results.  - Repeat colonoscopy in 3 years for surveillance based on pathology results.     - Resume previous diet indefinitely.   - Continue present medications.   - Discharge patient to home (ambulatory).  For questions, problems or results please call your physician - Clifton Whiting MD at Work:  [phone].  [facility], EMERGENCY ROOM PHONE NUMBER: [phone]  IF A COMPLICATION OR EMERGENCY SITUATION ARISES AND YOU ARE UNABLE TO REACH   YOUR PHYSICIAN - GO DIRECTLY TO THE EMERGENCY ROOM.  Clifton Whiting MD  1/24/2019 9:16:57 AM  This report has been verified and signed electronically.  [employer]

## 2019-01-24 NOTE — H&P
Endoscopy H&P    Procedure : Colonoscopy      asymptomatic screening exam      Past Medical History:   Diagnosis Date    Colon polyp     benign    Elevated PSA     High cholesterol     Hypertension     Prostate cancer        Family History   Problem Relation Age of Onset    Cancer Father         esophageal CA    Emphysema Mother     Coronary artery disease Maternal Grandfather     Cancer Maternal Uncle        Social History     Socioeconomic History    Marital status:      Spouse name: Not on file    Number of children: Not on file    Years of education: Not on file    Highest education level: Not on file   Social Needs    Financial resource strain: Not on file    Food insecurity - worry: Not on file    Food insecurity - inability: Not on file    Transportation needs - medical: Not on file    Transportation needs - non-medical: Not on file   Occupational History    Not on file   Tobacco Use    Smoking status: Never Smoker    Smokeless tobacco: Never Used   Substance and Sexual Activity    Alcohol use: Yes     Alcohol/week: 8.4 oz     Types: 10 Glasses of wine, 4 Cans of beer per week     Comment: social    Drug use: No    Sexual activity: Yes     Partners: Female   Other Topics Concern    Not on file   Social History Narrative    Not on file       Review of Systems:  Respiratory ROS: no cough, shortness of breath, or wheezing  Cardiovascular ROS: no chest pain or dyspnea on exertion  Gastrointestinal ROS: no abdominal pain, change in bowel habits, or black or bloody stools  Musculoskeletal ROS: negative  Neurological ROS: no TIA or stroke symptoms        Physical Exam:  General: no distress  Head: normocephalic  Neck: supple, symmetrical, trachea midline  Lungs:  clear to auscultation bilaterally and normal respiratory effort  Heart: regular rate and rhythm, S1, S2 normal, no murmur, rub or gallop  Abdomen: soft,  non-tender non-distented; bowel sounds normal; no masses,  no organomegaly  Extremities: no cyanosis or edema, or clubbing       Deep Sedation: Mallampati Score II (hard and soft palate, upper portion of tonsils anduvula visible)    II    Assessment and Plan:  Proceed with Colonoscopy

## 2019-01-24 NOTE — ANESTHESIA POSTPROCEDURE EVALUATION
"Anesthesia Post Evaluation    Patient: Adolfo Vazquez    Procedure(s) Performed: Procedure(s) (LRB):  COLONOSCOPY (N/A)    Final Anesthesia Type: general  Patient location during evaluation: PACU  Patient participation: Yes- Able to Participate  Level of consciousness: awake and alert and oriented  Post-procedure vital signs: reviewed and stable  Pain management: adequate  Airway patency: patent  PONV status at discharge: No PONV  Anesthetic complications: no      Cardiovascular status: hemodynamically stable  Respiratory status: unassisted  Hydration status: euvolemic  Follow-up not needed.        Visit Vitals  /62 (BP Location: Left arm, Patient Position: Sitting)   Pulse (!) 59   Temp 36.6 °C (97.9 °F) (Temporal)   Resp 18   Ht 5' 10" (1.778 m)   Wt 115.7 kg (255 lb)   SpO2 95%   BMI 36.59 kg/m²       Pain/Humaira Score: Humaira Score: 9 (1/24/2019  9:16 AM)        "

## 2019-01-24 NOTE — ANESTHESIA PREPROCEDURE EVALUATION
01/24/2019  Adolfo Vazquez is a 68 y.o., male.    Anesthesia Evaluation    I have reviewed the Patient Summary Reports.    I have reviewed the Nursing Notes.   I have reviewed the Medications.     Review of Systems  Anesthesia Hx:  No problems with previous Anesthesia    Hematology/Oncology:  Hematology Normal   Oncology Normal     EENT/Dental:EENT/Dental Normal   Cardiovascular:   Hypertension    Pulmonary:  Pulmonary Normal    Renal/:  Renal/ Normal     Hepatic/GI:  Hepatic/GI Normal    Musculoskeletal:  Musculoskeletal Normal    Neurological:  Neurology Normal    Endocrine:  Endocrine Normal    Dermatological:  Skin Normal    Psych:  Psychiatric Normal           Physical Exam  General:  Well nourished    Airway/Jaw/Neck:  Airway Findings: Mouth Opening: Normal Tongue: Normal  General Airway Assessment: Adult  Mallampati: II  TM Distance: Normal, at least 6 cm  Jaw/Neck Findings:  Neck ROM: Normal ROM     Eyes/Ears/Nose:  EYES/EARS/NOSE FINDINGS: Normal   Dental:  Dental Findings: In tact   Chest/Lungs:  Chest/Lungs Findings: Clear to auscultation, Normal Respiratory Rate     Heart/Vascular:  Heart Findings: Rate: Normal  Rhythm: Regular Rhythm  Sounds: Normal  Heart murmur: negative Vascular Findings: Normal    Abdomen:  Abdomen Findings: Normal    Musculoskeletal:  Musculoskeletal Findings: Normal   Skin:  Skin Findings: Normal    Mental Status:  Mental Status Findings: Normal        Anesthesia Plan  Type of Anesthesia, risks & benefits discussed:  Anesthesia Type:  general  Patient's Preference:   Intra-op Monitoring Plan: standard ASA monitors  Intra-op Monitoring Plan Comments:   Post Op Pain Control Plan: multimodal analgesia  Post Op Pain Control Plan Comments:   Induction:   IV  Beta Blocker:  Patient is not currently on a Beta-Blocker (No further documentation required).       Informed  Consent: Patient understands risks and agrees with Anesthesia plan.  Questions answered. Anesthesia consent signed with patient.  ASA Score: 2     Day of Surgery Review of History & Physical: I have interviewed and examined the patient. I have reviewed the patient's H&P dated:  There are no significant changes.  H&P update referred to the provider.         Ready For Surgery From Anesthesia Perspective.

## 2019-01-24 NOTE — DISCHARGE INSTRUCTIONS
Colonoscopy     A camera attached to a flexible tube with a viewing lens is used to take video pictures.     Colonoscopy is a test to view the inside of your lower digestive tract (colon and rectum). Sometimes it can show the last part of the small intestine (ileum). During the test, small pieces of tissue may be removed for testing. This is called a biopsy. Small growths, such as polyps, may also be removed.   Why is colonoscopy done?  The test is done to help look for colon cancer. And it can help find the source of abdominal pain, bleeding, and changes in bowel habits. It may be needed once a year, depending on factors such as your:  · Age  · Health history  · Family health history  · Symptoms  · Results from any prior colonoscopy  Risks and possible complications  These include:  · Bleeding               · A puncture or tear in the colon   · Risks of anesthesia  · A cancer lesion not being seen  Getting ready   To prepare for the test:  · Talk with your healthcare provider about the risks of the test (see below). Also ask your healthcare provider about alternatives to the test.  · Tell your healthcare provider about any medicines you take. Also tell him or her about any health conditions you may have.  · Make sure your rectum and colon are empty for the test. Follow the diet and bowel prep instructions exactly. If you dont, the test may need to be rescheduled.  · Plan for a friend or family member to drive you home after the test.     Colonoscopy provides an inside view of the entire colon.     You may discuss the results with your doctor right away or at a future visit.  During the test   The test is usually done in the hospital on an outpatient basis. This means you go home the same day. The procedure takes about 30 minutes. During that time:  · You are given relaxing (sedating) medicine through an IV line. You may be drowsy, or fully asleep.  · The healthcare provider will first give you a physical exam to  check for anal and rectal problems.  · Then the anus is lubricated and the scope inserted.  · If you are awake, you may have a feeling similar to needing to have a bowel movement. You may also feel pressure as air is pumped into the colon. Its OK to pass gas during the procedure.  · Biopsy, polyp removal, or other treatments may be done during the test.  After the test   You may have gas right after the test. It can help to try to pass it to help prevent later bloating. Your healthcare provider may discuss the results with you right away. Or you may need to schedule a follow-up visit to talk about the results. After the test, you can go back to your normal eating and other activities. You may be tired from the sedation and need to rest for a few hours.  Date Last Reviewed: 11/1/2016 © 2000-2017 [employer]. [address] [place] [address], [place] [address]. All rights reserved. This information is not intended as a substitute for professional medical care. Always follow your healthcare professional's instructions.

## 2019-01-31 ENCOUNTER — TELEPHONE (OUTPATIENT)
Dept: ENDOSCOPY | Facility: HOSPITAL | Age: 69
End: 2019-01-31

## 2019-04-26 DIAGNOSIS — Z00.00 ROUTINE GENERAL MEDICAL EXAMINATION AT A HEALTH CARE FACILITY: Primary | ICD-10-CM

## 2019-05-01 ENCOUNTER — CLINICAL SUPPORT (OUTPATIENT)
Dept: INTERNAL MEDICINE | Facility: CLINIC | Age: 69
End: 2019-05-01
Payer: MEDICARE

## 2019-05-01 ENCOUNTER — OFFICE VISIT (OUTPATIENT)
Dept: PODIATRY | Facility: CLINIC | Age: 69
End: 2019-05-01
Payer: MEDICARE

## 2019-05-01 ENCOUNTER — HOSPITAL ENCOUNTER (OUTPATIENT)
Dept: RADIOLOGY | Facility: HOSPITAL | Age: 69
Discharge: HOME | End: 2019-05-01
Attending: INTERNAL MEDICINE
Payer: MEDICARE

## 2019-05-01 ENCOUNTER — HOSPITAL ENCOUNTER (OUTPATIENT)
Dept: CARDIOLOGY | Facility: CLINIC | Age: 69
Discharge: HOME | End: 2019-05-01
Payer: MEDICARE

## 2019-05-01 ENCOUNTER — OFFICE VISIT (OUTPATIENT)
Dept: PULMONOLOGY | Facility: CLINIC | Age: 69
End: 2019-05-01
Payer: MEDICARE

## 2019-05-01 VITALS
BODY MASS INDEX: 36.51 KG/M2 | HEART RATE: 60 BPM | SYSTOLIC BLOOD PRESSURE: 136 MMHG | DIASTOLIC BLOOD PRESSURE: 71 MMHG | WEIGHT: 255 LBS | HEIGHT: 70 IN

## 2019-05-01 DIAGNOSIS — L60.0 INGROWN NAIL: Primary | ICD-10-CM

## 2019-05-01 DIAGNOSIS — Z00.00 ROUTINE GENERAL MEDICAL EXAMINATION AT A HEALTH CARE FACILITY: ICD-10-CM

## 2019-05-01 DIAGNOSIS — L03.039 PARONYCHIA, TOE, UNSPECIFIED LATERALITY: ICD-10-CM

## 2019-05-01 DIAGNOSIS — Z12.5 ENCOUNTER FOR SCREENING FOR MALIGNANT NEOPLASM OF PROSTATE: ICD-10-CM

## 2019-05-01 DIAGNOSIS — R94.6 ABNORMAL RESULTS OF THYROID FUNCTION STUDIES: ICD-10-CM

## 2019-05-01 DIAGNOSIS — Z00.00 ANNUAL PHYSICAL EXAM: Primary | ICD-10-CM

## 2019-05-01 DIAGNOSIS — Z00.00 ROUTINE GENERAL MEDICAL EXAMINATION AT A HEALTH CARE FACILITY: Primary | ICD-10-CM

## 2019-05-01 DIAGNOSIS — R79.9 ABNORMAL FINDING OF BLOOD CHEMISTRY: ICD-10-CM

## 2019-05-01 LAB
ALBUMIN SERPL BCP-MCNC: 3.9 G/DL (ref 3.5–5.2)
ALP SERPL-CCNC: 66 U/L (ref 55–135)
ALT SERPL W/O P-5'-P-CCNC: 25 U/L (ref 10–44)
ANION GAP SERPL CALC-SCNC: 10 MMOL/L (ref 8–16)
AST SERPL-CCNC: 21 U/L (ref 10–40)
BILIRUB SERPL-MCNC: 0.4 MG/DL (ref 0.1–1)
BUN SERPL-MCNC: 14 MG/DL (ref 8–23)
CALCIUM SERPL-MCNC: 10 MG/DL (ref 8.7–10.5)
CHLORIDE SERPL-SCNC: 106 MMOL/L (ref 95–110)
CHOLEST SERPL-MCNC: 191 MG/DL (ref 120–199)
CHOLEST/HDLC SERPL: 3.8 {RATIO} (ref 2–5)
CO2 SERPL-SCNC: 27 MMOL/L (ref 23–29)
COMPLEXED PSA SERPL-MCNC: <0.01 NG/ML (ref 0–4)
CREAT SERPL-MCNC: 0.9 MG/DL (ref 0.5–1.4)
ERYTHROCYTE [DISTWIDTH] IN BLOOD BY AUTOMATED COUNT: 12.3 % (ref 11.5–14.5)
EST. GFR  (AFRICAN AMERICAN): >60 ML/MIN/1.73 M^2
EST. GFR  (NON AFRICAN AMERICAN): >60 ML/MIN/1.73 M^2
ESTIMATED AVG GLUCOSE: 105 MG/DL (ref 68–131)
GLUCOSE SERPL-MCNC: 102 MG/DL (ref 70–110)
HBA1C MFR BLD HPLC: 5.3 % (ref 4–5.6)
HCT VFR BLD AUTO: 44 % (ref 40–54)
HDLC SERPL-MCNC: 50 MG/DL (ref 40–75)
HDLC SERPL: 26.2 % (ref 20–50)
HGB BLD-MCNC: 14.3 G/DL (ref 14–18)
LDLC SERPL CALC-MCNC: 111.6 MG/DL (ref 63–159)
MCH RBC QN AUTO: 31.9 PG (ref 27–31)
MCHC RBC AUTO-ENTMCNC: 32.5 G/DL (ref 32–36)
MCV RBC AUTO: 98 FL (ref 82–98)
NONHDLC SERPL-MCNC: 141 MG/DL
PLATELET # BLD AUTO: 247 K/UL (ref 150–350)
PMV BLD AUTO: 10.4 FL (ref 9.2–12.9)
POTASSIUM SERPL-SCNC: 4.6 MMOL/L (ref 3.5–5.1)
PROT SERPL-MCNC: 7 G/DL (ref 6–8.4)
RBC # BLD AUTO: 4.48 M/UL (ref 4.6–6.2)
SODIUM SERPL-SCNC: 143 MMOL/L (ref 136–145)
TRIGL SERPL-MCNC: 147 MG/DL (ref 30–150)
TSH SERPL DL<=0.005 MIU/L-ACNC: 1.97 UIU/ML (ref 0.4–4)
WBC # BLD AUTO: 7.58 K/UL (ref 3.9–12.7)

## 2019-05-01 PROCEDURE — 71046 XR CHEST PA AND LATERAL: ICD-10-PCS | Mod: 26,,, | Performed by: RADIOLOGY

## 2019-05-01 PROCEDURE — 93010 EKG 12-LEAD: ICD-10-PCS | Mod: S$PBB,,, | Performed by: INTERNAL MEDICINE

## 2019-05-01 PROCEDURE — 99397 PR PREVENTIVE VISIT,EST,65 & OVER: ICD-10-PCS | Mod: S$PBB,,, | Performed by: INTERNAL MEDICINE

## 2019-05-01 PROCEDURE — 71046 X-RAY EXAM CHEST 2 VIEWS: CPT | Mod: TC,FY

## 2019-05-01 PROCEDURE — 84153 ASSAY OF PSA TOTAL: CPT

## 2019-05-01 PROCEDURE — 99999 PR PBB SHADOW E&M-EST. PATIENT-LVL III: CPT | Mod: PBBFAC,,, | Performed by: PODIATRIST

## 2019-05-01 PROCEDURE — 80053 COMPREHEN METABOLIC PANEL: CPT

## 2019-05-01 PROCEDURE — 99211 OFF/OP EST MAY X REQ PHY/QHP: CPT | Mod: PBBFAC,25 | Performed by: INTERNAL MEDICINE

## 2019-05-01 PROCEDURE — 99203 OFFICE O/P NEW LOW 30 MIN: CPT | Mod: S$PBB,,, | Performed by: PODIATRIST

## 2019-05-01 PROCEDURE — 83036 HEMOGLOBIN GLYCOSYLATED A1C: CPT

## 2019-05-01 PROCEDURE — 85027 COMPLETE CBC AUTOMATED: CPT

## 2019-05-01 PROCEDURE — 99999 PR PBB SHADOW E&M-EST. PATIENT-LVL I: ICD-10-PCS | Mod: PBBFAC,,, | Performed by: INTERNAL MEDICINE

## 2019-05-01 PROCEDURE — 99999 PR PBB SHADOW E&M-EST. PATIENT-LVL III: ICD-10-PCS | Mod: PBBFAC,,, | Performed by: PODIATRIST

## 2019-05-01 PROCEDURE — 99999 PR PBB SHADOW E&M-EST. PATIENT-LVL I: CPT | Mod: PBBFAC,,, | Performed by: INTERNAL MEDICINE

## 2019-05-01 PROCEDURE — 93010 ELECTROCARDIOGRAM REPORT: CPT | Mod: S$PBB,,, | Performed by: INTERNAL MEDICINE

## 2019-05-01 PROCEDURE — 93005 ELECTROCARDIOGRAM TRACING: CPT | Mod: PBBFAC | Performed by: INTERNAL MEDICINE

## 2019-05-01 PROCEDURE — 99203 PR OFFICE/OUTPT VISIT, NEW, LEVL III, 30-44 MIN: ICD-10-PCS | Mod: S$PBB,,, | Performed by: PODIATRIST

## 2019-05-01 PROCEDURE — 84443 ASSAY THYROID STIM HORMONE: CPT

## 2019-05-01 PROCEDURE — 99213 OFFICE O/P EST LOW 20 MIN: CPT | Mod: PBBFAC,25,27 | Performed by: PODIATRIST

## 2019-05-01 PROCEDURE — 99397 PER PM REEVAL EST PAT 65+ YR: CPT | Mod: S$PBB,,, | Performed by: INTERNAL MEDICINE

## 2019-05-01 PROCEDURE — 71046 X-RAY EXAM CHEST 2 VIEWS: CPT | Mod: 26,,, | Performed by: RADIOLOGY

## 2019-05-01 PROCEDURE — 80061 LIPID PANEL: CPT

## 2019-05-01 RX ORDER — TOBRAMYCIN 3 MG/ML
SOLUTION/ DROPS OPHTHALMIC
Qty: 5 ML | Refills: 3 | Status: SHIPPED | OUTPATIENT
Start: 2019-05-01 | End: 2020-10-05 | Stop reason: CLARIF

## 2019-05-01 NOTE — LETTER
May 1, 2019    Adolfo Vazquez  [address] [place] [address]             [facility]  [address]  [facility] [address]  Phone: [phone] Dear Adolfo,         Thank you for allowing me to serve you and perform your [facility] exam on 5/1/2019. This letter will serve as a brief summary of the physical findings and laboratory/studies performed and recommendations at this time.  I think that you have heard this before: Except for your weight this is a normal exam. Work on knocking off a few pounds.         If you have any questions or concerns, please don't hesitate to call.    Sincerely,        Rigo Paul MD

## 2019-05-01 NOTE — PROGRESS NOTES
Subjective:       Patient ID: Adolfo Vazquez is a 68 y.o. male.    Chief Complaint: Annual Exam    HPI  69 yo in house  for [employer], His associate is an enu. He comes for his periodic health exam. He states that he has some vague pain in his thighs pily he first gets up but after walking it resolves. The distribution  of the pain is compatible with meralgia paresthetica  He is still obese, with no metabolic markers of that condition. He is one pound heavier this year at 261 pounds.   Review of Systems   Constitutional: Negative.    HENT: Negative.    Eyes: Negative.    Respiratory: Negative.    Cardiovascular: Negative.    Gastrointestinal: Negative.    Genitourinary: Negative.         S/P Prostatectomy at [facility] for prostate cancer  2013   Britt 7   Musculoskeletal: Negative.         Feet hurt when he walks. May benefit from orthotics for his shores.    Skin: Negative.    Neurological: Negative.         Bilateral meralgia paraesthetica    Psychiatric/Behavioral: Negative.    All other systems reviewed and are negative.      Objective:      Physical Exam   Constitutional: He is oriented to person, place, and time. He appears well-developed and well-nourished.   Obese   BMI:37   HENT:   Head: Normocephalic and atraumatic.   Right Ear: External ear normal.   Left Ear: External ear normal.   Eyes: Pupils are equal, round, and reactive to light. Conjunctivae and EOM are normal.   Neck: Normal range of motion. Neck supple.   Cardiovascular: Normal rate, regular rhythm and normal heart sounds.   Pulmonary/Chest: Effort normal and breath sounds normal.   Abdominal: Soft. Bowel sounds are normal.   Musculoskeletal: Normal range of motion.   Neurological: He is alert and oriented to person, place, and time. He has normal reflexes.   Skin: Skin is warm and dry.   Psychiatric: He has a normal mood and affect. His behavior is normal. Judgment and thought content normal.       Assessment:       1. Annual  physical exam        Plan:           Labs: All parameters are normal. PSA: <0.01  Chest x-ray is clear. EKG is normal.  IMP: Exogenous Obesity.

## 2019-05-01 NOTE — PROGRESS NOTES
Subjective:      Patient ID: Adolfo Vazquez is a 68 y.o. male.    Chief Complaint: Ingrown Toenail (b/l)    Deep throbbing pain both sides of both big toenails.  Gradual onset, worsening over past several weeks, aggravated by increased weight bearing, shoe gear, pressure.  No previous medical treatment.  OTC pain med not helping. Denies trauma, surgery both hallux.    Review of Systems   Constitution: Negative for chills, diaphoresis, fever, malaise/fatigue and night sweats.   Cardiovascular: Negative for claudication, cyanosis, leg swelling and syncope.   Skin: Positive for nail changes. Negative for color change, dry skin, rash, suspicious lesions and unusual hair distribution.   Musculoskeletal: Negative for falls, joint pain, joint swelling, muscle cramps, muscle weakness and stiffness.   Gastrointestinal: Negative for constipation, diarrhea, nausea and vomiting.   Neurological: Negative for brief paralysis, disturbances in coordination, focal weakness, numbness, paresthesias, sensory change and tremors.           Objective:      Physical Exam   Constitutional: He is oriented to person, place, and time. He appears well-developed and well-nourished. He is cooperative. No distress.   Cardiovascular:   Pulses:       Popliteal pulses are 2+ on the right side, and 2+ on the left side.        Dorsalis pedis pulses are 2+ on the right side, and 2+ on the left side.        Posterior tibial pulses are 2+ on the right side, and 2+ on the left side.   Capillary refill 3 seconds all toes/distal feet, all toes/both feet warm to touch.      Negative lymphadenopathy bilateral popliteal fossa and tarsal tunnel.      Negavie lower extremity edema bilateral.     Musculoskeletal:        Right ankle: He exhibits normal range of motion, no swelling, no ecchymosis, no deformity, no laceration and normal pulse. Achilles tendon normal. Achilles tendon exhibits no pain, no defect and normal Dang's test results.   Normal angle,  base, station of gait. All ten toes without clubbing, cyanosis, or signs of ischemia.  No pain to palpation bilateral lower extremities.  Range of motion, stability, muscle strength, and muscle tone normal bilateral feet and legs.     Lymphadenopathy: No inguinal adenopathy noted on the right or left side.   Negative lymphadenopathy bilateral popliteal fossa and tarsal tunnel.    Negative lymphangitic streaking bilateral feet/ankles/legs.   Neurological: He is alert and oriented to person, place, and time. He has normal strength. He displays no atrophy and no tremor. No sensory deficit. He exhibits normal muscle tone. Gait normal.   Reflex Scores:       Patellar reflexes are 2+ on the right side and 2+ on the left side.       Achilles reflexes are 2+ on the right side and 2+ on the left side.  Negative tinel sign to percussion sural, superficial peroneal, deep peroneal, saphenous, and posterior tibial nerves right and left ankles and feet.     Skin: Skin is warm, dry and intact. Capillary refill takes 2 to 3 seconds. No abrasion, no bruising, no burn, no ecchymosis, no laceration, no lesion and no rash noted. He is not diaphoretic. No cyanosis or erythema. No pallor. Nails show no clubbing.   Dry scale with superficial flakes over an erythematous base moccasin distribution both feet without ulceration, drainage, pus, tracking, fluctuance, malodor, or cardinal signs infection - declines work up and treatment.    Visible and palpable ingrowth of toenail lateral and medial border left and right hallux with pain to palpation, and focal localized erythema and edema,  without ulceration, drainage, pus, tracking, fluctuance, malodor, or cardinal signs infection.      Otherwise, Skin is normal age and health appropriate color, turgor, texture, and temperature bilateral lower extremities without ulceration, hyperpigmentation, discoloration, masses nodules or cords palpated.  No ecchymosis, erythema, edema, or cardinal signs  of infection bilateral lower extremities.     Psychiatric: He has a normal mood and affect.             Assessment:       Encounter Diagnoses   Name Primary?    Ingrown nail Yes    Paronychia, toe, unspecified laterality          Plan:       Adolfo was seen today for ingrown toenail.    Diagnoses and all orders for this visit:    Ingrown nail    Paronychia, toe, unspecified laterality    Other orders  -     tobramycin sulfate 0.3% (TOBREX) 0.3 % ophthalmic solution; 1-2 drops topically twice daily to affected toe(s).      I counseled the patient on his conditions, their implications and medical management.    Rx tobramycin dropsbid.  Cover both hallux all times until healed with band aid dressings changing daily.    Discussed conservative treatment with shoes of adequate dimensions, material, and style to alleviate symptoms and delay or prevent surgical intervention.     Utilizing sterile toenail clippers I aggressively debrided the offending nail border approximately 3 mm from its edge and carried the nail plate incision down at an angle in order to wedge out the offending cryptotic portion of the nail plate. The offending border was then removed in toto. The area was cleansed with alcohol. Patient tolerated the procedure well and related significant relief.      declines scheduled follow up.    Follow up if symptoms worsen or fail to improve.

## 2019-05-03 ENCOUNTER — OFFICE VISIT (OUTPATIENT)
Dept: ORTHOPEDICS | Facility: CLINIC | Age: 69
End: 2019-05-03
Payer: MEDICARE

## 2019-05-03 ENCOUNTER — HOSPITAL ENCOUNTER (OUTPATIENT)
Dept: RADIOLOGY | Facility: HOSPITAL | Age: 69
Discharge: HOME | End: 2019-05-03
Attending: NURSE PRACTITIONER
Payer: MEDICARE

## 2019-05-03 VITALS
DIASTOLIC BLOOD PRESSURE: 90 MMHG | HEART RATE: 69 BPM | WEIGHT: 255.06 LBS | HEIGHT: 70 IN | SYSTOLIC BLOOD PRESSURE: 152 MMHG | BODY MASS INDEX: 36.51 KG/M2

## 2019-05-03 DIAGNOSIS — M25.562 LEFT KNEE PAIN, UNSPECIFIED CHRONICITY: ICD-10-CM

## 2019-05-03 DIAGNOSIS — M17.12 OSTEOARTHRITIS OF LEFT KNEE, UNSPECIFIED OSTEOARTHRITIS TYPE: Primary | ICD-10-CM

## 2019-05-03 DIAGNOSIS — M25.562 LEFT KNEE PAIN, UNSPECIFIED CHRONICITY: Primary | ICD-10-CM

## 2019-05-03 PROCEDURE — 73562 X-RAY EXAM OF KNEE 3: CPT | Mod: 26,LT,, | Performed by: RADIOLOGY

## 2019-05-03 PROCEDURE — 99999 PR PBB SHADOW E&M-EST. PATIENT-LVL IV: ICD-10-PCS | Mod: PBBFAC,,, | Performed by: NURSE PRACTITIONER

## 2019-05-03 PROCEDURE — 73562 X-RAY EXAM OF KNEE 3: CPT | Mod: TC,LT

## 2019-05-03 PROCEDURE — 73560 XR KNEE ORTHO LEFT: ICD-10-PCS | Mod: 26,59,RT, | Performed by: RADIOLOGY

## 2019-05-03 PROCEDURE — 73560 X-RAY EXAM OF KNEE 1 OR 2: CPT | Mod: 26,59,RT, | Performed by: RADIOLOGY

## 2019-05-03 PROCEDURE — 73562 XR KNEE ORTHO LEFT: ICD-10-PCS | Mod: 26,LT,, | Performed by: RADIOLOGY

## 2019-05-03 PROCEDURE — 20610 DRAIN/INJ JOINT/BURSA W/O US: CPT | Mod: PBBFAC | Performed by: NURSE PRACTITIONER

## 2019-05-03 PROCEDURE — 20610 PR DRAIN/INJECT LARGE JOINT/BURSA: ICD-10-PCS | Mod: S$PBB,LT,, | Performed by: NURSE PRACTITIONER

## 2019-05-03 PROCEDURE — 73560 X-RAY EXAM OF KNEE 1 OR 2: CPT | Mod: TC,LT

## 2019-05-03 PROCEDURE — 99214 OFFICE O/P EST MOD 30 MIN: CPT | Mod: PBBFAC,25 | Performed by: NURSE PRACTITIONER

## 2019-05-03 PROCEDURE — 99204 OFFICE O/P NEW MOD 45 MIN: CPT | Mod: S$PBB,25,, | Performed by: NURSE PRACTITIONER

## 2019-05-03 PROCEDURE — 99204 PR OFFICE/OUTPT VISIT, NEW, LEVL IV, 45-59 MIN: ICD-10-PCS | Mod: S$PBB,25,, | Performed by: NURSE PRACTITIONER

## 2019-05-03 PROCEDURE — 20610 DRAIN/INJ JOINT/BURSA W/O US: CPT | Mod: S$PBB,LT,, | Performed by: NURSE PRACTITIONER

## 2019-05-03 PROCEDURE — 99999 PR PBB SHADOW E&M-EST. PATIENT-LVL IV: CPT | Mod: PBBFAC,,, | Performed by: NURSE PRACTITIONER

## 2019-05-03 RX ORDER — TRIAMCINOLONE ACETONIDE 40 MG/ML
40 INJECTION, SUSPENSION INTRA-ARTICULAR; INTRAMUSCULAR
Status: COMPLETED | OUTPATIENT
Start: 2019-05-03 | End: 2019-05-03

## 2019-05-03 RX ADMIN — TRIAMCINOLONE ACETONIDE 40 MG: 40 INJECTION, SUSPENSION INTRA-ARTICULAR; INTRAMUSCULAR at 03:05

## 2019-05-03 NOTE — PROGRESS NOTES
"  SUBJECTIVE:     Chief Complaint & History of Present Illness:  Adolfo Vazquez is a New patient 68 y.o. male who is seen here today with a complaint of    Chief Complaint   Patient presents with    Left Knee - Pain     Patient presents to clinic alone, he has no assistive device.  His c/c is left knee pain since Tuesday evening.  He denies trauma.  He reports a stiffness like pain to his left knee that is worse with lifting and twisting.  He states prior to that he was riding a bike on Saturday but no other unusual activities performed.  He denies fever, chills, numbness or tingling sensation to his lower leg.  He has used ice and elevated his leg for the past 2 days while helped but not fully relieved the pain.  He reports no leg catching or loss of balance.  He rates the pain at 10/10 today and describes it as a "stiffness".      Review of patient's allergies indicates:  No Known Allergies      Current Outpatient Medications   Medication Sig Dispense Refill    ABILIFY 2 mg Tab 2 mg once daily.       alprazolam (XANAX) 0.5 MG tablet 0.5 mg 2 (two) times daily as needed.       buPROPion (WELLBUTRIN XL) 150 MG 24 hr tablet       buPROPion (WELLBUTRIN XL) 300 MG 24 hr tablet       CYMBALTA 60 mg capsule Take 60 mg by mouth once daily.       levothyroxine (SYNTHROID) 100 MCG tablet TAKE 1 TABLET EVERY MORNING 90 tablet 3    losartan-hydrochlorothiazide 50-12.5 mg (HYZAAR) 50-12.5 mg per tablet TAKE 1 TABLET DAILY 90 tablet 3    simvastatin (ZOCOR) 40 MG tablet TAKE 1 TABLET NIGHTLY 90 tablet 2    tobramycin sulfate 0.3% (TOBREX) 0.3 % ophthalmic solution 1-2 drops topically twice daily to affected toe(s). 5 mL 3    trazodone (DESYREL) 100 MG tablet 100 mg nightly as needed.       zolpidem (AMBIEN) 10 mg Tab Take 1 tablet by mouth nightly as needed.      FLUZONE HIGH-DOSE 2016-17, PF, 180 mcg/0.5 mL Syrg 1 Dose once.  0    indomethacin (INDOCIN) 50 MG capsule Take 1 capsule (50 mg total) by mouth 3 " "(three) times daily with meals. 21 capsule 1    levothyroxine (SYNTHROID) 100 MCG tablet Take 1 tablet (100 mcg total) by mouth every morning. 90 tablet 3    neomycin-polymyxin-dexamethasone (DEXACINE) 3.5-10,000-0.1 mg-unit/g-% Oint   0    tadalafil (CIALIS) 5 MG tablet Take 1 tablet (5 mg total) by mouth nightly. 40 tablet 3     No current facility-administered medications for this visit.        Past Medical History:   Diagnosis Date    Colon polyp     benign    Elevated PSA     High cholesterol     Hypertension     Prostate cancer        Past Surgical History:   Procedure Laterality Date    COLON SURGERY      COLONOSCOPY N/A 1/24/2019    [facility] Clifton [facility]iting [facility]    PROSTATE SURGERY         Family History   Problem Relation Age of Onset    Cancer Father         esophageal CA    Emphysema Mother     Coronary artery disease Maternal Grandfather     Cancer Maternal Uncle          Vital Signs (Most Recent)  Vitals:    05/03/19 1518   BP: (!) 152/90   Pulse: 69       Review of Systems:  ROS:  Constitutional: no fever or chills  Eyes: no visual changes  ENT: no nasal congestion or sore throat  Respiratory: no cough or shortness of breath  Cardiovascular: no chest pain or palpitations  Gastrointestinal: no nausea or vomiting, tolerating diet  Genitourinary: no hematuria or dysuria  Integument/Breast: no rash or pruritis  Hematologic/Lymphatic: no easy bruising or lymphadenopathy  Musculoskeletal: left knee pain as above.  Neurological: no seizures or tremors  Behavioral/Psych: no auditory or visual hallucinations  Endocrine: no heat or cold intolerance      OBJECTIVE:     PHYSICAL EXAM:  Height: 5' 10" (177.8 cm) Weight: 115.7 kg (255 lb 1.2 oz), General Appearance: Well nourished, well developed, in no acute distress.  Neurological: Mood & affect are normal.  left  Knee Exam:  Knee Range of Motion:full   Effusion:not significant  Condition of skin:intact  Location of " tenderness:Lateral joint line   Strength:normal  Stability:  stable to testing, Lachman: stable, LCL: stable, MCL: stable and PCL: stable  Varus /Valgus stress:  normal  Castro:   negative      Hip Examination:  full painless range of motion, without tenderness    RADIOGRAPHS:  X-ray Knee Ortho Left  Narrative: EXAMINATION:  XR KNEE ORTHO LEFT    CLINICAL HISTORY:  lt knee; Pain in left knee    TECHNIQUE:  AP standing of both knees, Merchant views of both knees as well as a lateral view of the left knee were performed.    COMPARISON:  May 2008.    FINDINGS:  There is mild tricompartmental osteoarthrosis in each knee.  This includes spurring of the medial intra-articular eminences bilaterally and the superior pole of the left patella as seen on left lateral view.    I detect no fracture, dislocation, radiopaque retained foreign body, lytic or blastic lesion, erosion or chondrocalcinosis.  Impression: Please see above.    Electronically signed by: Sherice White MD  Date:    05/03/2019  Time:    15:21        ASSESSMENT/PLAN:       ICD-10-CM ICD-9-CM   1. Osteoarthritis of left knee, unspecified osteoarthritis type M17.12 715.96       Plan: We discussed with the patient at length all the different treatment options available for  the knee including anti-inflammatories, acetaminophen, rest, ice, knee strengthening exercise, occasional cortisone injections for temporary relief, Viscosupplimentation injections, arthroscopic debridement osteotomy, and finally knee arthroplasty.     -Patient presents to clinic for left knee pain.  -X-ray done today and reviewed by me shows no acute fractures or dislocation.  There is narrowing about his knee joint which is confirmed by radiology report.  -Recommend knee brace, will obtain one today.  -Recommend Tylenol or NSAIDs prn for pain.  -Ice and elevate PRN.  -Will give CSI today to see if it improves his pain.  -He will call me next week if pain persist and will refer to  therapy.  -He can follow up in 3 months or sooner for new or worsening problems.    PROCEDURE:  I have explained the risks, benefits, and alternatives of the procedure in detail.  The patient voices understanding and all questions have been answered.  The patient agrees to proceed as planned. So after I performed a sterile prep of the skin in the normal fashion the left knee is injected using a 22 gauge needle from the anterolateral approach with a combination of 4cc 1% plain lidocaine and 40 mg of Kenalog.  The patient is cautioned and immediate relief of pain is secondary to the local anesthetic and will be temporary.  After the anesthetic wears off there may be a increase in pain that may last for a few hours or a few days and they should use ice to help alleviate this flair up of pain.     Post procedure, patient tolerated well.  He reports feeling relief after injection.

## 2019-05-28 DIAGNOSIS — E05.90 HYPERTHYROIDISM: ICD-10-CM

## 2019-05-28 RX ORDER — LEVOTHYROXINE SODIUM 100 UG/1
TABLET ORAL
Qty: 90 TABLET | Refills: 3 | Status: SHIPPED | OUTPATIENT
Start: 2019-05-28 | End: 2021-06-01 | Stop reason: SDUPTHER

## 2019-09-12 ENCOUNTER — CLINICAL SUPPORT (OUTPATIENT)
Dept: INTERNAL MEDICINE | Facility: CLINIC | Age: 69
End: 2019-09-12
Payer: MEDICARE

## 2019-09-12 PROCEDURE — 90662 IIV NO PRSV INCREASED AG IM: CPT | Mod: PBBFAC

## 2019-09-12 PROCEDURE — G0009 ADMIN PNEUMOCOCCAL VACCINE: HCPCS | Mod: PBBFAC

## 2019-11-02 DIAGNOSIS — E78.5 HYPERLIPIDEMIA, UNSPECIFIED HYPERLIPIDEMIA TYPE: ICD-10-CM

## 2019-11-02 DIAGNOSIS — I10 ESSENTIAL HYPERTENSION: ICD-10-CM

## 2019-11-03 RX ORDER — SIMVASTATIN 40 MG/1
TABLET, FILM COATED ORAL
Qty: 90 TABLET | Refills: 2 | Status: SHIPPED | OUTPATIENT
Start: 2019-11-03 | End: 2020-06-13 | Stop reason: SDUPTHER

## 2019-11-03 RX ORDER — LOSARTAN POTASSIUM AND HYDROCHLOROTHIAZIDE 12.5; 5 MG/1; MG/1
TABLET ORAL
Qty: 90 TABLET | Refills: 3 | Status: SHIPPED | OUTPATIENT
Start: 2019-11-03 | End: 2020-12-08

## 2019-12-12 ENCOUNTER — HOSPITAL ENCOUNTER (OUTPATIENT)
Dept: RADIOLOGY | Facility: HOSPITAL | Age: 69
Discharge: HOME | End: 2019-12-12
Attending: NURSE PRACTITIONER
Payer: MEDICARE

## 2019-12-12 ENCOUNTER — OFFICE VISIT (OUTPATIENT)
Dept: ORTHOPEDICS | Facility: CLINIC | Age: 69
End: 2019-12-12
Payer: MEDICARE

## 2019-12-12 VITALS
DIASTOLIC BLOOD PRESSURE: 92 MMHG | WEIGHT: 270.5 LBS | HEIGHT: 70 IN | HEART RATE: 71 BPM | TEMPERATURE: 98 F | SYSTOLIC BLOOD PRESSURE: 167 MMHG | BODY MASS INDEX: 38.73 KG/M2

## 2019-12-12 DIAGNOSIS — M17.12 OSTEOARTHRITIS OF LEFT KNEE, UNSPECIFIED OSTEOARTHRITIS TYPE: Primary | ICD-10-CM

## 2019-12-12 DIAGNOSIS — M17.12 OSTEOARTHRITIS OF LEFT KNEE, UNSPECIFIED OSTEOARTHRITIS TYPE: ICD-10-CM

## 2019-12-12 PROCEDURE — 20610 DRAIN/INJ JOINT/BURSA W/O US: CPT | Mod: S$PBB,LT,, | Performed by: NURSE PRACTITIONER

## 2019-12-12 PROCEDURE — 1159F MED LIST DOCD IN RCRD: CPT | Mod: ,,, | Performed by: NURSE PRACTITIONER

## 2019-12-12 PROCEDURE — 20610 PR DRAIN/INJECT LARGE JOINT/BURSA: ICD-10-PCS | Mod: S$PBB,LT,, | Performed by: NURSE PRACTITIONER

## 2019-12-12 PROCEDURE — 99214 OFFICE O/P EST MOD 30 MIN: CPT | Mod: S$PBB,25,, | Performed by: NURSE PRACTITIONER

## 2019-12-12 PROCEDURE — 73564 XR KNEE ORTHO LEFT WITH FLEXION: ICD-10-PCS | Mod: 26,LT,, | Performed by: RADIOLOGY

## 2019-12-12 PROCEDURE — 73564 X-RAY EXAM KNEE 4 OR MORE: CPT | Mod: 26,LT,, | Performed by: RADIOLOGY

## 2019-12-12 PROCEDURE — 73562 XR KNEE ORTHO LEFT WITH FLEXION: ICD-10-PCS | Mod: 26,RT,, | Performed by: RADIOLOGY

## 2019-12-12 PROCEDURE — 20610 DRAIN/INJ JOINT/BURSA W/O US: CPT | Mod: PBBFAC | Performed by: NURSE PRACTITIONER

## 2019-12-12 PROCEDURE — 99214 OFFICE O/P EST MOD 30 MIN: CPT | Mod: PBBFAC,25 | Performed by: NURSE PRACTITIONER

## 2019-12-12 PROCEDURE — 99214 PR OFFICE/OUTPT VISIT, EST, LEVL IV, 30-39 MIN: ICD-10-PCS | Mod: S$PBB,25,, | Performed by: NURSE PRACTITIONER

## 2019-12-12 PROCEDURE — 73562 X-RAY EXAM OF KNEE 3: CPT | Mod: TC,RT,59

## 2019-12-12 PROCEDURE — 99999 PR PBB SHADOW E&M-EST. PATIENT-LVL IV: ICD-10-PCS | Mod: PBBFAC,,, | Performed by: NURSE PRACTITIONER

## 2019-12-12 PROCEDURE — 1125F PR PAIN SEVERITY QUANTIFIED, PAIN PRESENT: ICD-10-PCS | Mod: ,,, | Performed by: NURSE PRACTITIONER

## 2019-12-12 PROCEDURE — 73562 X-RAY EXAM OF KNEE 3: CPT | Mod: 26,RT,, | Performed by: RADIOLOGY

## 2019-12-12 PROCEDURE — 1125F AMNT PAIN NOTED PAIN PRSNT: CPT | Mod: ,,, | Performed by: NURSE PRACTITIONER

## 2019-12-12 PROCEDURE — 99999 PR PBB SHADOW E&M-EST. PATIENT-LVL IV: CPT | Mod: PBBFAC,,, | Performed by: NURSE PRACTITIONER

## 2019-12-12 PROCEDURE — 1159F PR MEDICATION LIST DOCUMENTED IN MEDICAL RECORD: ICD-10-PCS | Mod: ,,, | Performed by: NURSE PRACTITIONER

## 2019-12-12 RX ORDER — TRIAMCINOLONE ACETONIDE 40 MG/ML
40 INJECTION, SUSPENSION INTRA-ARTICULAR; INTRAMUSCULAR
Status: COMPLETED | OUTPATIENT
Start: 2019-12-12 | End: 2019-12-12

## 2019-12-12 RX ADMIN — TRIAMCINOLONE ACETONIDE 40 MG: 40 INJECTION, SUSPENSION INTRA-ARTICULAR; INTRAMUSCULAR at 01:12

## 2019-12-12 NOTE — PROGRESS NOTES
SUBJECTIVE:     Chief Complaint & History of Present Illness:  Adolfo Vazquez is a Established patient 69 y.o. male who was last seen in May for c/c left knee pain.  He was given a CSI and reports pain had improved.  He returns today for similar complaint.  States he was doing some work around his home and knelt down and got pain to his left knee.  States he then had to travel to [place] and was walking long distances in the airport terminals and the pain seemed to get worse.  He denies falling, fever or chills.  Has no reports of numbness or tingling sensation.  States pain more noticeable when bending his knee as opposed to walking in upright position.  He has taken Motrin and one of his wife's Percocet with some relief.  He would like to get another steroid injection today if possible.      Review of patient's allergies indicates:  No Known Allergies      Current Outpatient Medications   Medication Sig Dispense Refill    ABILIFY 2 mg Tab 2 mg once daily.       alprazolam (XANAX) 0.5 MG tablet 0.5 mg 2 (two) times daily as needed.       buPROPion (WELLBUTRIN XL) 150 MG 24 hr tablet       buPROPion (WELLBUTRIN XL) 300 MG 24 hr tablet       CYMBALTA 60 mg capsule Take 60 mg by mouth once daily.       levothyroxine (SYNTHROID) 100 MCG tablet TAKE 1 TABLET EVERY MORNING 90 tablet 3    losartan-hydrochlorothiazide 50-12.5 mg (HYZAAR) 50-12.5 mg per tablet TAKE 1 TABLET DAILY 90 tablet 3    simvastatin (ZOCOR) 40 MG tablet TAKE 1 TABLET NIGHTLY 90 tablet 2    trazodone (DESYREL) 100 MG tablet 100 mg nightly as needed.       zolpidem (AMBIEN) 10 mg Tab Take 1 tablet by mouth nightly as needed.      FLUZONE HIGH-DOSE 2016-17, PF, 180 mcg/0.5 mL Syrg 1 Dose once.  0    indomethacin (INDOCIN) 50 MG capsule Take 1 capsule (50 mg total) by mouth 3 (three) times daily with meals. (Patient not taking: Reported on 12/12/2019) 21 capsule 1    levothyroxine (SYNTHROID) 100 MCG tablet TAKE 1 TABLET EVERY MORNING  "(Patient not taking: Reported on 12/12/2019) 90 tablet 3    neomycin-polymyxin-dexamethasone (DEXACINE) 3.5-10,000-0.1 mg-unit/g-% Oint   0    tadalafil (CIALIS) 5 MG tablet Take 1 tablet (5 mg total) by mouth nightly. (Patient not taking: Reported on 12/12/2019) 40 tablet 3    tobramycin sulfate 0.3% (TOBREX) 0.3 % ophthalmic solution 1-2 drops topically twice daily to affected toe(s). (Patient not taking: Reported on 12/12/2019) 5 mL 3     No current facility-administered medications for this visit.        Past Medical History:   Diagnosis Date    Colon polyp     benign    Elevated PSA     High cholesterol     Hypertension     Prostate cancer        Past Surgical History:   Procedure Laterality Date    COLON SURGERY      COLONOSCOPY N/A 1/24/2019    Procedure: COLONOSCOPY;  Surgeon: Clifton Whiting MD;  Location: [address];  Service: Endoscopy;  Laterality: N/A;    PROSTATE SURGERY         Family History   Problem Relation Age of Onset    Cancer Father         esophageal CA    Emphysema Mother     Coronary artery disease Maternal Grandfather     Cancer Maternal Uncle          Vital Signs (Most Recent)  Vitals:    12/12/19 1134   BP: (!) 167/92   Pulse: 71   Temp: 97.8 °F (36.6 °C)       Review of Systems:  ROS:  Constitutional: no fever or chills  Eyes: no visual changes  ENT: no nasal congestion or sore throat  Respiratory: no cough or shortness of breath  Cardiovascular: no chest pain or palpitations  Gastrointestinal: no nausea or vomiting, tolerating diet  Genitourinary: no hematuria or dysuria  Integument/Breast: no rash or pruritis  Hematologic/Lymphatic: no easy bruising or lymphadenopathy  Musculoskeletal: left knee pain as above.  Neurological: no seizures or tremors  Behavioral/Psych: no auditory or visual hallucinations  Endocrine: no heat or cold intolerance      OBJECTIVE:     PHYSICAL EXAM:  Height: 5' 10" (177.8 cm) Weight: 122.7 kg (270 lb 8.1 oz),   General: Pleasant, " cooperative, NAD   HEENT: NCAT, sclera nonicteric   Lungs: Respirations are equal and unlabored.   Abdomen: Soft and non-tender.  CV: 2+ bilateral upper and lower extremity pulses.   Skin: Intact throughout LE with no rashes, erythema, or lesions  Extremities: No LE edema, NVI lower extremities      left  Knee Exam:  Knee Range of Motion:full   Effusion:not significant  Condition of skin:intact  Location of tenderness:Lateral joint line   Strength:normal  Stability:  stable to testing, Lachman: stable, LCL: stable, MCL: stable and PCL: stable  Varus /Valgus stress:  normal  Castro:   negative      Hip Examination:  full painless range of motion, without tenderness    RADIOGRAPHS:  X-ray of left knee obtained and personally reviewed by me.  No fractures or dislocation seen.  There is mild medial tibiofemoral joint space narrowing noted.      ASSESSMENT/PLAN:       ICD-10-CM ICD-9-CM   1. Osteoarthritis of left knee, unspecified osteoarthritis type M17.12 715.96       Plan: We discussed with the patient at length all the different treatment options available for  the knee including anti-inflammatories, acetaminophen, rest, ice, knee strengthening exercise, occasional cortisone injections for temporary relief, Viscosupplimentation injections, arthroscopic debridement osteotomy, and finally knee arthroplasty.     -Patient presents to clinic for left knee pain.  -X-ray as above.  -Recommend knee brace over next 1-2 weeks with activity.  -Recommend Tylenol or NSAIDs prn for pain.  -Ice and elevate PRN.  -Will give CSI today to see if it improves his pain.  -He will call me next week if pain persist, if it does may consider MRI.  -He can follow up in 3 months PRN or sooner for new or worsening problems.    PROCEDURE:  I have explained the risks, benefits, and alternatives of the procedure in detail.  The patient voices understanding and all questions have been answered.  The patient agrees to proceed as planned. So after  I performed a sterile prep of the skin in the normal fashion the left knee is injected using a 22 gauge needle from the anterolateral approach with 2 cc of 1% plain lidocaine, I then attempted to aspirate the knee but no fluid returned.  Then using the same needed, I injected using a combination of 4cc 1% plain lidocaine and 40 mg of Kenalog.  The patient is cautioned and immediate relief of pain is secondary to the local anesthetic and will be temporary.  After the anesthetic wears off there may be a increase in pain that may last for a few hours or a few days and they should use ice to help alleviate this flair up of pain.     Post procedure, patient tolerated well.  He reports feeling relief after injection.

## 2020-07-06 ENCOUNTER — HOSPITAL ENCOUNTER (OUTPATIENT)
Dept: RADIOLOGY | Facility: HOSPITAL | Age: 70
Discharge: HOME | End: 2020-07-06
Attending: NURSE PRACTITIONER
Payer: MEDICARE

## 2020-07-06 ENCOUNTER — OFFICE VISIT (OUTPATIENT)
Dept: ORTHOPEDICS | Facility: CLINIC | Age: 70
End: 2020-07-06
Payer: MEDICARE

## 2020-07-06 VITALS
WEIGHT: 278.44 LBS | BODY MASS INDEX: 39.86 KG/M2 | SYSTOLIC BLOOD PRESSURE: 138 MMHG | HEART RATE: 85 BPM | DIASTOLIC BLOOD PRESSURE: 84 MMHG | HEIGHT: 70 IN

## 2020-07-06 DIAGNOSIS — M25.561 RIGHT KNEE PAIN, UNSPECIFIED CHRONICITY: Primary | ICD-10-CM

## 2020-07-06 DIAGNOSIS — M17.11 OSTEOARTHRITIS OF RIGHT KNEE, UNSPECIFIED OSTEOARTHRITIS TYPE: Primary | ICD-10-CM

## 2020-07-06 DIAGNOSIS — M25.561 RIGHT KNEE PAIN, UNSPECIFIED CHRONICITY: ICD-10-CM

## 2020-07-06 PROCEDURE — 20610 PR DRAIN/INJECT LARGE JOINT/BURSA: ICD-10-PCS | Mod: S$PBB,RT,, | Performed by: NURSE PRACTITIONER

## 2020-07-06 PROCEDURE — 20610 DRAIN/INJ JOINT/BURSA W/O US: CPT | Mod: S$PBB,RT,, | Performed by: NURSE PRACTITIONER

## 2020-07-06 PROCEDURE — 73564 X-RAY EXAM KNEE 4 OR MORE: CPT | Mod: 26,RT,, | Performed by: RADIOLOGY

## 2020-07-06 PROCEDURE — 99999 PR PBB SHADOW E&M-EST. PATIENT-LVL IV: CPT | Mod: PBBFAC,,, | Performed by: NURSE PRACTITIONER

## 2020-07-06 PROCEDURE — 20610 DRAIN/INJ JOINT/BURSA W/O US: CPT | Mod: PBBFAC | Performed by: NURSE PRACTITIONER

## 2020-07-06 PROCEDURE — 99214 PR OFFICE/OUTPT VISIT, EST, LEVL IV, 30-39 MIN: ICD-10-PCS | Mod: 25,S$PBB,, | Performed by: NURSE PRACTITIONER

## 2020-07-06 PROCEDURE — 99214 OFFICE O/P EST MOD 30 MIN: CPT | Mod: PBBFAC,25 | Performed by: NURSE PRACTITIONER

## 2020-07-06 PROCEDURE — 99999 PR PBB SHADOW E&M-EST. PATIENT-LVL IV: ICD-10-PCS | Mod: PBBFAC,,, | Performed by: NURSE PRACTITIONER

## 2020-07-06 PROCEDURE — 73564 XR KNEE ORTHO RIGHT WITH FLEXION: ICD-10-PCS | Mod: 26,RT,, | Performed by: RADIOLOGY

## 2020-07-06 PROCEDURE — 73562 X-RAY EXAM OF KNEE 3: CPT | Mod: 26,59,LT, | Performed by: RADIOLOGY

## 2020-07-06 PROCEDURE — 99214 OFFICE O/P EST MOD 30 MIN: CPT | Mod: 25,S$PBB,, | Performed by: NURSE PRACTITIONER

## 2020-07-06 PROCEDURE — 73562 X-RAY EXAM OF KNEE 3: CPT | Mod: TC,LT

## 2020-07-06 PROCEDURE — 73562 XR KNEE ORTHO RIGHT WITH FLEXION: ICD-10-PCS | Mod: 26,59,LT, | Performed by: RADIOLOGY

## 2020-07-06 RX ORDER — TRIAMCINOLONE ACETONIDE 40 MG/ML
40 INJECTION, SUSPENSION INTRA-ARTICULAR; INTRAMUSCULAR
Status: COMPLETED | OUTPATIENT
Start: 2020-07-06 | End: 2020-07-06

## 2020-07-06 RX ADMIN — TRIAMCINOLONE ACETONIDE 40 MG: 40 INJECTION, SUSPENSION INTRA-ARTICULAR; INTRAMUSCULAR at 06:07

## 2020-07-06 NOTE — PROGRESS NOTES
SUBJECTIVE:     Chief Complaint & History of Present Illness:  Adolfo Vazquez is a Established 69 y.o. year old male patient here with a history of constant right knee pain which started approximately 9 days ago.  There is not a history of trauma.  The pain is located in the global aspect of the knee.  The pain is described as burning.  There is radiation.  There is not catching or locking.  Aggravating factors include going up and down stairs and walking.  Associated symptoms include effusion.  There is not numbness or tingling of the lower extremity.  There is not back pain. Previous treatments include mobic and opioids which he reports the mobic has worked better for him.  There is not a history of previous injury or surgery to the knee.  The patient does use an assistive device.    In summary, patient reports pain and swelling to right knee approximately 9 days ago.  No trauma.  Went to an ED in [place] and was told there could be fluid on the knee.  He was prescribed opioids and NSAIDs and told to follow up with Ortho.  Patient denies fever or chills.  States knee does feel warm on occasion.  No history of Gout.  I have seen him last in Dec 2019 for his left knee OA which he was given a CSI and did well.  No other issues.    Review of patient's allergies indicates:  No Known Allergies      Current Outpatient Medications   Medication Sig Dispense Refill    ABILIFY 2 mg Tab 2 mg once daily.       alprazolam (XANAX) 0.5 MG tablet 0.5 mg 2 (two) times daily as needed.       buPROPion (WELLBUTRIN XL) 150 MG 24 hr tablet       buPROPion (WELLBUTRIN XL) 300 MG 24 hr tablet       CYMBALTA 60 mg capsule Take 60 mg by mouth once daily.       FLUZONE HIGH-DOSE 2016-17, PF, 180 mcg/0.5 mL Syrg 1 Dose once.  0    levothyroxine (SYNTHROID) 100 MCG tablet TAKE 1 TABLET EVERY MORNING 90 tablet 3    losartan-hydrochlorothiazide 50-12.5 mg (HYZAAR) 50-12.5 mg per tablet TAKE 1 TABLET DAILY 90 tablet 3    simvastatin  (ZOCOR) 40 MG tablet TAKE 1 TABLET NIGHTLY 90 tablet 2    trazodone (DESYREL) 100 MG tablet 100 mg nightly as needed.       indomethacin (INDOCIN) 50 MG capsule Take 1 capsule (50 mg total) by mouth 3 (three) times daily with meals. (Patient not taking: Reported on 12/12/2019) 21 capsule 1    levothyroxine (SYNTHROID) 100 MCG tablet TAKE 1 TABLET EVERY MORNING 90 tablet 3    neomycin-polymyxin-dexamethasone (DEXACINE) 3.5-10,000-0.1 mg-unit/g-% Oint   0    tadalafil (CIALIS) 5 MG tablet Take 1 tablet (5 mg total) by mouth nightly. (Patient not taking: Reported on 12/12/2019) 40 tablet 3    tobramycin sulfate 0.3% (TOBREX) 0.3 % ophthalmic solution 1-2 drops topically twice daily to affected toe(s). (Patient not taking: Reported on 12/12/2019) 5 mL 3    zolpidem (AMBIEN) 10 mg Tab Take 1 tablet by mouth nightly as needed.       No current facility-administered medications for this visit.        Past Medical History:   Diagnosis Date    Colon polyp     benign    Elevated PSA     High cholesterol     Hypertension     Prostate cancer        Past Surgical History:   Procedure Laterality Date    COLON SURGERY      COLONOSCOPY N/A 1/24/2019    Procedure: COLONOSCOPY;  Surgeon: Clifton Whiting MD;  Location: [address];  Service: Endoscopy;  Laterality: N/A;    PROSTATE SURGERY         Family History   Problem Relation Age of Onset    Cancer Father         esophageal CA    Emphysema Mother     Coronary artery disease Maternal Grandfather     Cancer Maternal Uncle          Review of Systems:  ROS:  Constitutional: no fever or chills  Eyes: no visual changes  ENT: no nasal congestion or sore throat  Respiratory: no cough or shortness of breath  Cardiovascular: no chest pain or palpitations  Gastrointestinal: no nausea or vomiting, tolerating diet  Genitourinary: no hematuria or dysuria  Integument/Breast: no rash or pruritis  Hematologic/Lymphatic: no easy bruising or  "lymphadenopathy  Musculoskeletal: positive for arthralgias  Neurological: no seizures or tremors  Behavioral/Psych: no auditory or visual hallucinations  Endocrine: no heat or cold intolerance      OBJECTIVE:     PHYSICAL EXAM:  Vital Signs (Most Recent)  Vitals:    07/06/20 1420   BP: 138/84   Pulse: 85     Height: 5' 10" (177.8 cm) Weight: 126.3 kg (278 lb 7.1 oz),   General Appearance: Well nourished, well developed, in no acute distress.  HENT: Normal cephalic, oropharynx pink and moist  Eyes: PERRLA bilaterally and EOM x 4  Respiratory: Even and unlabored  Skin: Warm and Dry.   Psychiatric: AAO x 4, Mood & affect are normal.    right  Knee Exam:  Knee Range of Motion:full   Effusion:not significant  Condition of skin:intact  Location of tenderness:Medial joint line and Lateral joint line   Strength:normal  Stability:  stable to testing, Lachman: stable, LCL: stable, MCL: stable and PCL: stable  Varus /Valgus stress:  normal  Castro:   negative    left  Knee Exam:  Knee Range of Motion:normal   Effusion:none  Condition of skin:intact  Location of tenderness:None   Strength:normal  Stability:  stable to testing, Lachman: stable, LCL: stable, MCL: stable and PCL: stable  Varus /Valgus stress:  normal  Castro:   negative      Hip Examination:  normal    RADIOGRAPHS:  X-ray of the right knee obtained and personally reviewed by me.  No acute fracture or dislocations seen.      ASSESSMENT/PLAN:       ICD-10-CM ICD-9-CM   1. Osteoarthritis of right knee, unspecified osteoarthritis type  M17.11 715.96       Plan: We discussed with the patient at length all the different treatment options available for  the knee including anti-inflammatories, acetaminophen, rest, ice, knee strengthening exercise, occasional cortisone injections for temporary relief, Viscosupplimentation injections, arthroscopic debridement osteotomy, and finally knee arthroplasty.     -Adolfo Vazquez presents to clinic today with c/c right knee pain " for the past 9 days  -X-ray as above.  -Recommend RICE therapy.  -Recommend continue use of NSAIDs and alternate with Tylenol PRN.  -He would like to get a steroid injection, advised will first aspirate the knee to make sure there is no infection.  We also discuss the possibility of gout.    PROCEDURE:  I have explained the risks, benefits, and alternatives of the procedure in detail.  The patient voices understanding and all questions have been answered.  The patient agrees to proceed as planned. So after I performed a sterile prep of the skin in the normal fashion the right knee is injected using a 22 gauge needle from the anterolateral approach with 1 cc 1% plain lidocaine.  Then I proceeded to aspirate the knee.  There was no fluid aspirated then using the same needle, I proceed with a combination of 4cc 1% plain lidocaine and 40 mg of Kenalog.  The patient is cautioned and immediate relief of pain is secondary to the local anesthetic and will be temporary.  After the anesthetic wears off there may be a increase in pain that may last for a few hours or a few days and they should use ice to help alleviate this flair up of pain.     Patient tolerated procedure well and post injection they reported improvement in their pain.

## 2020-09-21 DIAGNOSIS — E78.5 HYPERLIPIDEMIA, UNSPECIFIED HYPERLIPIDEMIA TYPE: ICD-10-CM

## 2020-09-23 RX ORDER — SIMVASTATIN 40 MG/1
40 TABLET, FILM COATED ORAL NIGHTLY
Qty: 90 TABLET | Refills: 2 | Status: SHIPPED | OUTPATIENT
Start: 2020-09-23 | End: 2021-02-23 | Stop reason: SDUPTHER

## 2020-10-01 ENCOUNTER — PATIENT MESSAGE (OUTPATIENT)
Dept: OTHER | Facility: OTHER | Age: 70
End: 2020-10-01

## 2020-10-05 ENCOUNTER — HOSPITAL ENCOUNTER (EMERGENCY)
Facility: HOSPITAL | Age: 70
Discharge: HOME | End: 2020-10-05
Payer: MEDICARE

## 2020-10-05 VITALS
WEIGHT: 274 LBS | HEART RATE: 77 BPM | RESPIRATION RATE: 20 BRPM | HEIGHT: 70 IN | SYSTOLIC BLOOD PRESSURE: 173 MMHG | DIASTOLIC BLOOD PRESSURE: 103 MMHG | BODY MASS INDEX: 39.22 KG/M2 | OXYGEN SATURATION: 96 % | TEMPERATURE: 98 F

## 2020-10-05 DIAGNOSIS — S32.010A CLOSED COMPRESSION FRACTURE OF BODY OF L1 VERTEBRA: Primary | ICD-10-CM

## 2020-10-05 PROCEDURE — 70450 CT HEAD/BRAIN W/O DYE: CPT | Mod: TC

## 2020-10-05 PROCEDURE — 72125 CT CERVICAL SPINE WITHOUT CONTRAST: ICD-10-PCS | Mod: 26,,, | Performed by: RADIOLOGY

## 2020-10-05 PROCEDURE — 70450 CT HEAD WITHOUT CONTRAST: ICD-10-PCS | Mod: 26,,, | Performed by: RADIOLOGY

## 2020-10-05 PROCEDURE — 99285 EMERGENCY DEPT VISIT HI MDM: CPT | Mod: 25

## 2020-10-05 PROCEDURE — 72131 CT LUMBAR SPINE WITHOUT CONTRAST: ICD-10-PCS | Mod: 26,,, | Performed by: RADIOLOGY

## 2020-10-05 PROCEDURE — 72125 CT NECK SPINE W/O DYE: CPT | Mod: 26,,, | Performed by: RADIOLOGY

## 2020-10-05 PROCEDURE — 72131 CT LUMBAR SPINE W/O DYE: CPT | Mod: TC

## 2020-10-05 PROCEDURE — 70450 CT HEAD/BRAIN W/O DYE: CPT | Mod: 26,,, | Performed by: RADIOLOGY

## 2020-10-05 PROCEDURE — 72131 CT LUMBAR SPINE W/O DYE: CPT | Mod: 26,,, | Performed by: RADIOLOGY

## 2020-10-05 PROCEDURE — 72128 CT CHEST SPINE W/O DYE: CPT | Mod: 26,,, | Performed by: RADIOLOGY

## 2020-10-05 PROCEDURE — 72125 CT NECK SPINE W/O DYE: CPT | Mod: TC

## 2020-10-05 PROCEDURE — 72128 CT THORACIC SPINE WITHOUT CONTRAST: ICD-10-PCS | Mod: 26,,, | Performed by: RADIOLOGY

## 2020-10-05 PROCEDURE — 72128 CT CHEST SPINE W/O DYE: CPT | Mod: TC

## 2020-10-05 RX ORDER — OXYCODONE AND ACETAMINOPHEN 7.5; 325 MG/1; MG/1
1 TABLET ORAL EVERY 6 HOURS PRN
Qty: 20 TABLET | Refills: 0 | Status: SHIPPED | OUTPATIENT
Start: 2020-10-05 | End: 2020-10-10

## 2020-10-05 NOTE — ED TRIAGE NOTES
Patient fell yesterday getting off his boat trailer, complaining of back pain and head pain, no LOC.

## 2020-10-05 NOTE — DISCHARGE INSTRUCTIONS
Take the medications as prescribed. Return for any worsening or new symptoms. Follow up with Primary Care Provider and spine surgeon within the week.

## 2020-10-05 NOTE — ED PROVIDER NOTES
Please note that my documentation in this Electronic Healthcare Record was produced using speech recognition software and therefore may contain errors related to that software.These could include grammar, punctuation and spelling errors or the inclusion/ exclusion of phrases that were not intended. Please contact myself for any clarification, questions or concerns.    HPI: Patient is a 69 y.o. male who presents with the chief complaint of fall.  Patient states he was trying to get down from the boat from the trailer and fell onto his back and hit his head.  Denies any loss of consciousness.  He has been having some pain in the mid to lower back.  He has been taking his wife's oxycodone stay and therefore does not have much pain at this time.  Last tablet was taken just prior to arrival.  Denies any visual changes, vomiting, extremity weakness or paresthesias, abdominal pain, chest pain, difficulty breathing.  States he was having some pain in the mid to lower back but denies any head pain or neck pain previously.  He does have known history of depression, hypercholesterolemia, hypertension, and anxiety.  Does not take any blood thinners regularly.    REVIEW OF SYSTEMS - 10 systems were independently reviewed and are otherwise negative with the exception of those items previously documented in the HPI and nursing notes.    Allergy: Patient has no known allergies.    Past medical history:   Past Medical History:   Diagnosis Date    Anxiety     Colon polyp     benign    Depression     Elevated PSA     High cholesterol     Hypertension     Prostate cancer        Surgical History:   Past Surgical History:   Procedure Laterality Date    COLON SURGERY      COLONOSCOPY N/A 1/24/2019    Procedure: COLONOSCOPY;  Surgeon: Clifton Whiting MD;  Location: [address];  Service: Endoscopy;  Laterality: N/A;    PROSTATE SURGERY      SKIN CANCER EXCISION         Social history:   Social History  "    Socioeconomic History    Marital status:      Spouse name: Not on file    Number of children: Not on file    Years of education: Not on file    Highest education level: Not on file   Occupational History    Not on file   Social Needs    Financial resource strain: Not on file    Food insecurity     Worry: Not on file     Inability: Not on file    Transportation needs     Medical: Not on file     Non-medical: Not on file   Tobacco Use    Smoking status: Never Smoker    Smokeless tobacco: Never Used   Substance and Sexual Activity    Alcohol use: Yes     Alcohol/week: 14.0 standard drinks     Types: 10 Glasses of wine, 4 Cans of beer per week     Comment: social    Drug use: No    Sexual activity: Yes     Partners: Female   Lifestyle    Physical activity     Days per week: Not on file     Minutes per session: Not on file    Stress: Not on file   Relationships    Social connections     Talks on phone: Not on file     Gets together: Not on file     Attends Christian service: Not on file     Active member of club or organization: Not on file     Attends meetings of clubs or organizations: Not on file     Relationship status: Not on file   Other Topics Concern    Not on file   Social History Narrative    Not on file       Family history: non-contributory    EHR: reviewed    Vitals: BP (!) 173/103 (BP Location: Left arm, Patient Position: Sitting)   Pulse 77   Temp 98.3 °F (36.8 °C) (Oral)   Resp 20   Ht 5' 10" (1.778 m)   Wt 124.3 kg (274 lb)   SpO2 96%   BMI 39.31 kg/m²     PHYSICAL EXAM:    General-69-year-old male awake and alert, oriented, GCS 15, in no acute distress,  HEENT- normocephalic, atraumatic, sclera anicteric, moist mucous membranes, PERRL, EOMI  CARDIOVASCULAR- regular rate and rhythm, no murmurs/rubs,/gallops, normal S1-S2  PULMONARY- nonlabored, no respiratory distress, clear to auscultation bilaterally, no wheezes/rhonchi/rales, chest expansion " symmetrical  GASTROINTESTINAL- soft, nontender, nondistended, no rigidity, rebound, or guarding, no CVA tenderness  NEUROLOGIC- mental status normal, speech fluid, cognition normal, CN II-XII grossly intact, sensations equal normal bilateral upper and lower extremities, peripheral pulse 2 +/4, ambulatory with proper gait.  MUSCULOSKELETAL- well-nourished, well-developed, range of motion of the cervical, thoracic, and lumbar spine.  No point tenderness to any portion of the spine.  No paraspinal tenderness.  DERMATOLOGIC- warm and dry, no visible rashes  PSYCHIATRIC- normal affect, normal concentration           Labs Reviewed - No data to display    CT Lumbar Spine Without Contrast   Final Result   Abnormal      1. Mild, 10%, superior endplate compression fracture of L1.   2. Mild multilevel degenerative disc disease resulting in mild central spinal canal stenosis with mild-to-moderate bilateral neural foraminal narrowing.   This report was flagged in Epic as abnormal.         Electronically signed by: Andres Cedeno   Date:    10/05/2020   Time:    15:25      CT Thoracic Spine Without Contrast   Final Result      1. No acute fracture or subluxation.   2. Mild dextroscoliosis.   3. Mild multilevel degenerative disc disease.         Electronically signed by: Andres Cedeno   Date:    10/05/2020   Time:    15:22      CT Cervical Spine Without Contrast   Final Result      1. No acute fracture or subluxation.   2. Mild to moderate multilevel degenerative disc disease resulting in mild central spinal canal stenosis with bilateral neural foraminal narrowing.         Electronically signed by: Andres Cedeno   Date:    10/05/2020   Time:    15:04      CT Head Without Contrast   Final Result      1. Cortical atrophy with periventricular deep white matter change consistent with chronic small vessel ischemic disease.         Electronically signed by: Andres Cedeno   Date:    10/05/2020   Time:    15:02          MEDICAL  DECISION MAKING: Patient is a 69 y.o. male who presented with chief complaint of back pain after an injury that occurred a couple days ago.  He did hit his head but denies any loss of consciousness.  Denies any blood thinner use.  On my examination, he had absolutely no pain or tenderness palpation.  He did take a Percocet prior to arrival.  This medication is prescribed his wife.  Patient overall appears well.  He is ambulatory proper gait.  CT scan of the head does show some chronic small vessel ischemic disease.  CT cervical spine and thoracic spine do show some degenerative disc disease.  CT lumbar spine is concerning for a mild 10% superior endplate compression fracture L1.  I did try and speak to the transfer center regards to spinal surgery consult.  They advised that the surgeon is in surgery and will be another 2-3 hours.  The nurses in or stated he is at a party where he should not be interrupted.  I did advise the patient of this and he would not want like to wait and follow-up outpatient.  He is given a referral but also will try to schedule but with his wife's neurosurgeon.  He be sent home with Percocet and advised on heating pad and stretching.  He will return if he develops any worsening symptoms.  Blood pressure mildly elevated.  He will follow up with his primary care.  He has an appointment in 2 days with his pulmonologist.  CLINICAL IMPRESSION:  1. Closed compression fracture of body of L1 vertebra         ASHLEY Grimes  10/05/20 2015

## 2020-10-06 ENCOUNTER — TELEPHONE (OUTPATIENT)
Dept: PULMONOLOGY | Facility: CLINIC | Age: 70
End: 2020-10-06

## 2020-10-06 NOTE — TELEPHONE ENCOUNTER
----- Message from Gurvinder Pruitt sent at 10/6/2020 11:23 AM CDT -----  Contact: pt @ [phone]  Pt states he has a compression fracture on spine and wants to confirm with the doctor if he should come into clinic on 10/07/20 or if he should reschedule

## 2020-10-06 NOTE — TELEPHONE ENCOUNTER
Mr Taylor called to report he has a compression fracture in his back and he is in [place], [place] and won't be able to make his appointment with Dr Paul tomorrow. He said just send him a slip in the mail for a end of the month appointment and he will come then. Pippa Will LPN

## 2020-10-07 ENCOUNTER — TELEPHONE (OUTPATIENT)
Dept: PULMONOLOGY | Facility: CLINIC | Age: 70
End: 2020-10-07

## 2020-10-07 NOTE — TELEPHONE ENCOUNTER
----- Message from Emory Reza sent at 10/7/2020  8:20 AM CDT -----  Regarding: Prescription Refill   oxyCODONE-acetaminophen (PERCOCET) 7.5-325 mg per tablet      Luigi Perales [facility] [address] [place]    ph# [phone]      Pt is also requesting a call back from physician staff - Pippa       [phone] (Ord)

## 2020-10-07 NOTE — TELEPHONE ENCOUNTER
Dr Paul said he will not prescribe Mr Vazquez any Percocet, that he should ask the ER doctor he saw at [facility] for a prescription. His ER report says he took a Percocet that belonged to his wife the day he went TO ER. I tried to call Mr Vazquez to tell him this but his voicemail is full. I will try tomorrow. Pippa Will LPN.

## 2020-10-09 ENCOUNTER — HOSPITAL ENCOUNTER (EMERGENCY)
Facility: HOSPITAL | Age: 70
Discharge: HOME | End: 2020-10-09
Attending: EMERGENCY MEDICINE
Payer: MEDICARE

## 2020-10-09 VITALS
TEMPERATURE: 98 F | WEIGHT: 265 LBS | BODY MASS INDEX: 37.94 KG/M2 | SYSTOLIC BLOOD PRESSURE: 180 MMHG | HEART RATE: 77 BPM | DIASTOLIC BLOOD PRESSURE: 89 MMHG | HEIGHT: 70 IN | OXYGEN SATURATION: 97 % | RESPIRATION RATE: 18 BRPM

## 2020-10-09 DIAGNOSIS — S32.010D COMPRESSION FRACTURE OF L1 VERTEBRA WITH ROUTINE HEALING, SUBSEQUENT ENCOUNTER: ICD-10-CM

## 2020-10-09 DIAGNOSIS — K59.00 CONSTIPATION, UNSPECIFIED CONSTIPATION TYPE: Primary | ICD-10-CM

## 2020-10-09 LAB
ALBUMIN SERPL BCP-MCNC: 4.1 G/DL (ref 3.5–5.2)
ALP SERPL-CCNC: 61 U/L (ref 55–135)
ALT SERPL W/O P-5'-P-CCNC: 29 U/L (ref 10–44)
ANION GAP SERPL CALC-SCNC: 8 MMOL/L (ref 8–16)
AST SERPL-CCNC: 22 U/L (ref 10–40)
BASOPHILS # BLD AUTO: 0.04 K/UL (ref 0–0.2)
BASOPHILS NFR BLD: 0.5 % (ref 0–1.9)
BILIRUB SERPL-MCNC: 0.9 MG/DL (ref 0.1–1)
BILIRUB UR QL STRIP: NEGATIVE
BUN SERPL-MCNC: 12 MG/DL (ref 8–23)
CALCIUM SERPL-MCNC: 9.7 MG/DL (ref 8.7–10.5)
CHLORIDE SERPL-SCNC: 99 MMOL/L (ref 95–110)
CLARITY UR: CLEAR
CO2 SERPL-SCNC: 32 MMOL/L (ref 23–29)
COLOR UR: YELLOW
CREAT SERPL-MCNC: 1 MG/DL (ref 0.5–1.4)
DIFFERENTIAL METHOD: ABNORMAL
EOSINOPHIL # BLD AUTO: 0.3 K/UL (ref 0–0.5)
EOSINOPHIL NFR BLD: 4.1 % (ref 0–8)
ERYTHROCYTE [DISTWIDTH] IN BLOOD BY AUTOMATED COUNT: 12.6 % (ref 11.5–14.5)
EST. GFR  (AFRICAN AMERICAN): >60 ML/MIN/1.73 M^2
EST. GFR  (NON AFRICAN AMERICAN): >60 ML/MIN/1.73 M^2
GLUCOSE SERPL-MCNC: 123 MG/DL (ref 70–110)
GLUCOSE UR QL STRIP: NEGATIVE
HCT VFR BLD AUTO: 43.7 % (ref 40–54)
HGB BLD-MCNC: 14.3 G/DL (ref 14–18)
HGB UR QL STRIP: NEGATIVE
IMM GRANULOCYTES # BLD AUTO: 0.05 K/UL (ref 0–0.04)
IMM GRANULOCYTES NFR BLD AUTO: 0.6 % (ref 0–0.5)
KETONES UR QL STRIP: NEGATIVE
LEUKOCYTE ESTERASE UR QL STRIP: NEGATIVE
LYMPHOCYTES # BLD AUTO: 1.2 K/UL (ref 1–4.8)
LYMPHOCYTES NFR BLD: 14.9 % (ref 18–48)
MCH RBC QN AUTO: 30.7 PG (ref 27–31)
MCHC RBC AUTO-ENTMCNC: 32.7 G/DL (ref 32–36)
MCV RBC AUTO: 94 FL (ref 82–98)
MONOCYTES # BLD AUTO: 0.9 K/UL (ref 0.3–1)
MONOCYTES NFR BLD: 10.8 % (ref 4–15)
NEUTROPHILS # BLD AUTO: 5.7 K/UL (ref 1.8–7.7)
NEUTROPHILS NFR BLD: 69.1 % (ref 38–73)
NITRITE UR QL STRIP: NEGATIVE
NRBC BLD-RTO: 0 /100 WBC
PH UR STRIP: 7 [PH] (ref 5–8)
PLATELET # BLD AUTO: 263 K/UL (ref 150–350)
PMV BLD AUTO: 9.7 FL (ref 9.2–12.9)
POTASSIUM SERPL-SCNC: 3.8 MMOL/L (ref 3.5–5.1)
PROT SERPL-MCNC: 7.9 G/DL (ref 6–8.4)
PROT UR QL STRIP: NEGATIVE
RBC # BLD AUTO: 4.66 M/UL (ref 4.6–6.2)
SODIUM SERPL-SCNC: 139 MMOL/L (ref 136–145)
SP GR UR STRIP: 1.01 (ref 1–1.03)
URN SPEC COLLECT METH UR: NORMAL
UROBILINOGEN UR STRIP-ACNC: NEGATIVE EU/DL
WBC # BLD AUTO: 8.31 K/UL (ref 3.9–12.7)

## 2020-10-09 PROCEDURE — 74177 CT ABD & PELVIS W/CONTRAST: CPT | Mod: 26,,, | Performed by: RADIOLOGY

## 2020-10-09 PROCEDURE — 25000003 PHARM REV CODE 250: Performed by: EMERGENCY MEDICINE

## 2020-10-09 PROCEDURE — 25500020 PHARM REV CODE 255: Performed by: EMERGENCY MEDICINE

## 2020-10-09 PROCEDURE — 81003 URINALYSIS AUTO W/O SCOPE: CPT

## 2020-10-09 PROCEDURE — 96361 HYDRATE IV INFUSION ADD-ON: CPT

## 2020-10-09 PROCEDURE — 74177 CT ABDOMEN PELVIS WITH CONTRAST: ICD-10-PCS | Mod: 26,,, | Performed by: RADIOLOGY

## 2020-10-09 PROCEDURE — 80053 COMPREHEN METABOLIC PANEL: CPT

## 2020-10-09 PROCEDURE — 85025 COMPLETE CBC W/AUTO DIFF WBC: CPT

## 2020-10-09 PROCEDURE — 96372 THER/PROPH/DIAG INJ SC/IM: CPT | Mod: 59

## 2020-10-09 PROCEDURE — 96360 HYDRATION IV INFUSION INIT: CPT

## 2020-10-09 PROCEDURE — 99285 EMERGENCY DEPT VISIT HI MDM: CPT | Mod: 25

## 2020-10-09 PROCEDURE — 63600175 PHARM REV CODE 636 W HCPCS: Performed by: EMERGENCY MEDICINE

## 2020-10-09 PROCEDURE — 74177 CT ABD & PELVIS W/CONTRAST: CPT | Mod: TC

## 2020-10-09 RX ORDER — POLYETHYLENE GLYCOL 3350 17 G/17G
17 POWDER, FOR SOLUTION ORAL DAILY
Qty: 119 G | Refills: 0 | Status: SHIPPED | OUTPATIENT
Start: 2020-10-09 | End: 2020-10-16

## 2020-10-09 RX ORDER — KETOROLAC TROMETHAMINE 10 MG/1
10 TABLET, FILM COATED ORAL EVERY 6 HOURS
Qty: 12 TABLET | Refills: 0 | Status: SHIPPED | OUTPATIENT
Start: 2020-10-09 | End: 2020-10-12

## 2020-10-09 RX ORDER — SODIUM CHLORIDE 9 MG/ML
500 INJECTION, SOLUTION INTRAVENOUS
Status: COMPLETED | OUTPATIENT
Start: 2020-10-09 | End: 2020-10-09

## 2020-10-09 RX ADMIN — IOHEXOL 100 ML: 350 INJECTION, SOLUTION INTRAVENOUS at 06:10

## 2020-10-09 RX ADMIN — METHYLNALTREXONE BROMIDE 12 MG: 12 INJECTION, SOLUTION SUBCUTANEOUS at 06:10

## 2020-10-09 RX ADMIN — SODIUM CHLORIDE 500 ML: 0.9 INJECTION, SOLUTION INTRAVENOUS at 06:10

## 2020-10-09 NOTE — ED PROVIDER NOTES
Encounter Date: 10/9/2020       History     Chief Complaint   Patient presents with    Constipation     x 5 days     69-year-old male past medical history significant for hypertension, depression recent fall on October 5th with diagnosis of L1 compression fracture presenting today with multiple complaints including persistence of back pain now with pain to bilateral flanks, generalized abdomen concerns for constipation.  Patient denies new fall or other injury.  Patient does report compliance with his home Percocet medication.  Denies nausea vomiting but does report decreased p.o. intake.  Patient reports able to tolerate p.o. fluids and denies any difficulty with urination.  Denies sravan hematuria or dysuria.  Denies new numbness or weakness in his lower extremities.  Denies headache, vision changes or dizziness.  Denies history of anticoagulant use        Review of patient's allergies indicates:  No Known Allergies  Past Medical History:   Diagnosis Date    Anxiety     Colon polyp     benign    Depression     Elevated PSA     High cholesterol     Hypertension     Prostate cancer      Past Surgical History:   Procedure Laterality Date    COLON SURGERY      COLONOSCOPY N/A 1/24/2019    Procedure: COLONOSCOPY;  Surgeon: Clifton Whiting MD;  Location: [facility] ([address] [place] [address]);  Service: Endoscopy;  Laterality: N/A;    PROSTATE SURGERY      SKIN CANCER EXCISION       Family History   Problem Relation Age of Onset    Cancer Father         esophageal CA    Emphysema Mother     Coronary artery disease Maternal Grandfather     Cancer Maternal Uncle      Social History     Tobacco Use    Smoking status: Never Smoker    Smokeless tobacco: Never Used   Substance Use Topics    Alcohol use: Yes     Alcohol/week: 14.0 standard drinks     Types: 10 Glasses of wine, 4 Cans of beer per week     Comment: social    Drug use: No     Review of Systems   Constitutional: Negative for appetite change, chills, diaphoresis,  fatigue and fever.   HENT: Negative for congestion, ear pain, rhinorrhea, sinus pressure, sinus pain, sore throat and tinnitus.    Eyes: Negative for photophobia and visual disturbance.   Respiratory: Negative for cough, chest tightness, shortness of breath and wheezing.    Cardiovascular: Negative for chest pain, palpitations and leg swelling.   Gastrointestinal: Positive for abdominal pain. Negative for constipation, diarrhea, nausea and vomiting.   Endocrine: Negative for cold intolerance, heat intolerance, polydipsia, polyphagia and polyuria.   Genitourinary: Positive for flank pain and frequency. Negative for decreased urine volume, difficulty urinating, dysuria, hematuria and urgency.   Musculoskeletal: Positive for back pain. Negative for arthralgias, gait problem, joint swelling, myalgias, neck pain and neck stiffness.   Skin: Negative for color change, pallor, rash and wound.   Allergic/Immunologic: Negative for immunocompromised state.   Neurological: Negative for dizziness, syncope, weakness, light-headedness, numbness and headaches.   Hematological: Negative for adenopathy. Does not bruise/bleed easily.   Psychiatric/Behavioral: Negative for decreased concentration, dysphoric mood and sleep disturbance. The patient is not nervous/anxious.    All other systems reviewed and are negative.      Physical Exam     Initial Vitals [10/09/20 0530]   BP Pulse Resp Temp SpO2   (!) 180/89 77 18 98 °F (36.7 °C) 97 %      MAP       --         Physical Exam    Nursing note and vitals reviewed.  Constitutional: He appears well-developed and well-nourished. He is not diaphoretic. No distress.   HENT:   Head: Normocephalic and atraumatic.   Right Ear: External ear normal.   Left Ear: External ear normal.   Nose: Nose normal.   Mouth/Throat: Uvula is midline, oropharynx is clear and moist and mucous membranes are normal. No trismus in the jaw.   Eyes: Conjunctivae and EOM are normal. Pupils are equal, round, and reactive  to light. No scleral icterus.   Neck: Normal range of motion. Neck supple. No JVD present.   Cardiovascular: Normal rate, regular rhythm, normal heart sounds and intact distal pulses.   Pulmonary/Chest: Breath sounds normal. No respiratory distress. He has no wheezes. He has no rhonchi. He has no rales. He exhibits no tenderness.   Abdominal: Soft. Bowel sounds are normal. He exhibits no distension. There is generalized abdominal tenderness. There is no rebound and no guarding.   Musculoskeletal: Normal range of motion. No tenderness or edema.        Back:       Comments: Diffuse tenderness over bilateral flanks, paraspinal musculature of the lumbar spine and lower back.  No saddle anesthesia.   Lymphadenopathy:     He has no cervical adenopathy.   Neurological: He is alert and oriented to person, place, and time. GCS score is 15. GCS eye subscore is 4. GCS verbal subscore is 5. GCS motor subscore is 6.   Skin: Skin is warm and dry. Capillary refill takes less than 2 seconds. No rash and no abscess noted. No erythema. No pallor.   Psychiatric: He has a normal mood and affect. His behavior is normal. Judgment and thought content normal.         ED Course   Procedures  Labs Reviewed   CBC W/ AUTO DIFFERENTIAL - Abnormal; Notable for the following components:       Result Value    Immature Granulocytes 0.6 (*)     Immature Grans (Abs) 0.05 (*)     Lymph% 14.9 (*)     All other components within normal limits   COMPREHENSIVE METABOLIC PANEL - Abnormal; Notable for the following components:    CO2 32 (*)     Glucose 123 (*)     All other components within normal limits   URINALYSIS, REFLEX TO URINE CULTURE          Imaging Results          CT Abdomen Pelvis With Contrast (In process)               X-Rays:   Independently Interpreted Readings:   Other Readings:  CT Abdomen And Pelvis With Contrast Exam date and time: 10/9/2020 6:36 AM    COMPARISON: No relevant prior studies available.     FINDINGS: Liver: Normal. No  mass. Gallbladder and bile ducts: Normal. No calcified stones. No ductal dilation. Pancreas: Normal. No ductal dilation. Spleen: Normal. No splenomegaly. Adrenals: Normal. No mass. Kidneys and ureters: Normal. No hydronephrosis. Stomach and bowel: There is right colonic and sigmoid colon diverticulosis. There is moderate stool burden is seen in the transverse and proximal descending colon. Appendix: No evidence of appendicitis.    Intraperitoneal space: Unremarkable. No free air. No significant fluid collection. Vasculature: There is moderate aortic and iliac mural calcifications. Lymph nodes: Unremarkable. No enlarged lymph nodes. Urinary bladder: Unremarkable as visualized. Reproductive: Unremarkable as visualized. Bones/joints: There is multilevel lumbar spine facet arthrosis. There is mild L1 compression deformity with mild prevertebral soft tissue edema. Soft tissues: There is a small left inguinal on a fat containing hernia. Soft tissue like attenuation seen in the most inferior aspect of the inguinal canal.     IMPRESSION:   1. Mild L1 acute compression fracture. No subluxation seen. Posterior elements are intact.   2. Ascending and sigmoid colon diverticulosis with no CT evidence of diverticulitis.   3. Moderate stool burden in the transverse and proximal descending colon.   4. Small left inguinal fat containing hernia. Soft tissue like attenuation seen in the most inferior aspect of the left inguinal canal could be due to high-riding testicle however other etiologies cannot be excluded. Correlate with clinical history and physical exam. If indicated ultrasound may be obtained for further evaluation.     Thank you for allowing us to participate in the care of your patient.   Dictated and Authenticated by: Devin Merritt MD 10/09/2020 7:17 AM Central Time ([place] & [place])    Medical Decision Making:   Initial Assessment:   69-year-old male nontoxic-appearing, afebrile, hypertensive recent fall landing on back  diagnosis L1 fracture rib presenting with multiple complaints.  Will send screening labs ensure no significant anemia, renal dysfunction, CT rule out acute/subacute intra-abdominal pathology, obstruction.  Will provide subcu Relistor for reversal of narcotic possible cause of constipation with reassessment  Differential Diagnosis:   Constipation, Acute cholecystitis, acute cystitis, acute pyelonephritis, acute small bowel obstruction, acute diverticulitis, diverticulosis, acute nephrolithiasis/ureterolithiasis, incarcerated hernia, acute peritonitis, acute appendicitis, acute myocardial infarction, gastritis, GERD, acute mesenteric ischemia  Clinical Tests:   Lab Tests: Ordered and Reviewed  Radiological Study: Ordered and Reviewed  ED Management:  As we discussed, it is important that you return to the ER for any new concerns or symptoms, worsening of your existing symptoms, if you do not completely improve, or if you are unable to be seen by your primary care provider.    Some medical conditions are difficult to diagnose and may not be identified during an ER visit. Today, we did not find a medical condition that required inpatient admission, but please remember that medical conditions can change, and we cannot predict how you will be feeling tomorrow or the next day, so if you have any worsening or new symptoms, you should not hesitate to return to the emergency department for reevaluation.     Be sure to follow up with your primary care doctor for a recheck and to review any labs/imaging that were performed today.  If you do not have a primary care doctor, you may contact the one listed on your discharge paperwork or you may also call the [facility] Appointment Desk at [phone] to schedule an appointment with one.       All medications have side effects.  We have done our best to select a medication for you that will treat your health problem and have the least amount of side effects.  If at any time  while or after you take this medication you develops new symptoms or have any concerns, it is important you stop taking the medication and call your primary care provider or return to the emergency department for evaluation.    Do not drive or operate heavy machinery for 24 hours if you have received any pain medications, sedatives or mood altering drugs during your ER visit.                 06:00 Care report to Dr. Bahena; care relinquished.            Clinical Impression:     ICD-10-CM ICD-9-CM   1. Constipation, unspecified constipation type  K59.00 564.00   2. Compression fracture of L1 vertebra with routine healing, subsequent encounter  S32.010D V54.17                      Disposition:   Disposition: Discharged  Condition: Stable     ED Disposition Condition    Discharge Stable        ED Prescriptions     Medication Sig Dispense Start Date End Date Auth. Provider    polyethylene glycol (GLYCOLAX) 17 gram/dose powder Take 17 g by mouth once daily. for 7 days 119 g 10/9/2020 10/16/2020 Harper Bahena MD        Follow-up Information     Follow up With Specialties Details Why Contact Info    Rigo Paul MD Pulmonary Disease Schedule an appointment as soon as possible for a visit in 3 days  [address]  [place] [address]  [phone]                                         Harper Bahena MD  10/09/20 0784
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
## 2020-10-15 RX ORDER — AZELASTINE 1 MG/ML
1 SPRAY, METERED NASAL DAILY
COMMUNITY
Start: 2020-09-30

## 2020-10-15 RX ORDER — OXYCODONE AND ACETAMINOPHEN 5; 325 MG/1; MG/1
1 TABLET ORAL 2 TIMES DAILY PRN
COMMUNITY
Start: 2020-10-13 | End: 2021-06-09

## 2020-10-15 RX ORDER — MONTELUKAST SODIUM 10 MG/1
1 TABLET ORAL NIGHTLY
COMMUNITY
Start: 2020-09-30 | End: 2022-02-22

## 2020-10-16 ENCOUNTER — PATIENT MESSAGE (OUTPATIENT)
Dept: PULMONOLOGY | Facility: CLINIC | Age: 70
End: 2020-10-16

## 2020-10-16 DIAGNOSIS — Z12.5 SCREENING FOR MALIGNANT NEOPLASM OF PROSTATE: ICD-10-CM

## 2020-10-16 DIAGNOSIS — E78.00 HIGH CHOLESTEROL: ICD-10-CM

## 2020-10-16 DIAGNOSIS — R06.09 DOE (DYSPNEA ON EXERTION): ICD-10-CM

## 2020-10-16 DIAGNOSIS — E03.9 HYPOTHYROIDISM, UNSPECIFIED TYPE: ICD-10-CM

## 2020-10-16 DIAGNOSIS — I10 HYPERTENSION, UNSPECIFIED TYPE: ICD-10-CM

## 2020-10-16 DIAGNOSIS — C61 PROSTATE CANCER: Primary | ICD-10-CM

## 2020-10-21 ENCOUNTER — LAB VISIT (OUTPATIENT)
Dept: LAB | Facility: HOSPITAL | Age: 70
End: 2020-10-21
Attending: INTERNAL MEDICINE
Payer: MEDICARE

## 2020-10-21 DIAGNOSIS — E03.9 HYPOTHYROIDISM, UNSPECIFIED TYPE: ICD-10-CM

## 2020-10-21 DIAGNOSIS — E78.00 HIGH CHOLESTEROL: ICD-10-CM

## 2020-10-21 DIAGNOSIS — Z12.5 SCREENING FOR MALIGNANT NEOPLASM OF PROSTATE: ICD-10-CM

## 2020-10-21 LAB
CHOLEST SERPL-MCNC: 147 MG/DL (ref 120–199)
CHOLEST/HDLC SERPL: 3.3 {RATIO} (ref 2–5)
COMPLEXED PSA SERPL-MCNC: <0.01 NG/ML (ref 0–4)
HDLC SERPL-MCNC: 45 MG/DL (ref 40–75)
HDLC SERPL: 30.6 % (ref 20–50)
LDLC SERPL CALC-MCNC: 82.2 MG/DL (ref 63–159)
NONHDLC SERPL-MCNC: 102 MG/DL
TRIGL SERPL-MCNC: 99 MG/DL (ref 30–150)
TSH SERPL DL<=0.005 MIU/L-ACNC: 3.01 UIU/ML (ref 0.34–5.6)

## 2020-10-21 PROCEDURE — 84153 ASSAY OF PSA TOTAL: CPT

## 2020-10-21 PROCEDURE — 84443 ASSAY THYROID STIM HORMONE: CPT

## 2020-10-21 PROCEDURE — 80061 LIPID PANEL: CPT

## 2020-10-21 PROCEDURE — 36415 COLL VENOUS BLD VENIPUNCTURE: CPT

## 2020-10-27 ENCOUNTER — HOSPITAL ENCOUNTER (OUTPATIENT)
Dept: RADIOLOGY | Facility: HOSPITAL | Age: 70
Discharge: HOME | End: 2020-10-27
Attending: INTERNAL MEDICINE
Payer: MEDICARE

## 2020-10-27 ENCOUNTER — OFFICE VISIT (OUTPATIENT)
Dept: PULMONOLOGY | Facility: CLINIC | Age: 70
End: 2020-10-27
Payer: MEDICARE

## 2020-10-27 VITALS
BODY MASS INDEX: 38.5 KG/M2 | HEIGHT: 70 IN | WEIGHT: 268.94 LBS | SYSTOLIC BLOOD PRESSURE: 138 MMHG | OXYGEN SATURATION: 97 % | DIASTOLIC BLOOD PRESSURE: 88 MMHG | HEART RATE: 80 BPM

## 2020-10-27 DIAGNOSIS — Z00.00 ANNUAL PHYSICAL EXAM: Primary | ICD-10-CM

## 2020-10-27 DIAGNOSIS — R06.09 DOE (DYSPNEA ON EXERTION): ICD-10-CM

## 2020-10-27 PROCEDURE — 99999 PR PBB SHADOW E&M-EST. PATIENT-LVL IV: ICD-10-PCS | Mod: PBBFAC,,, | Performed by: INTERNAL MEDICINE

## 2020-10-27 PROCEDURE — 99214 OFFICE O/P EST MOD 30 MIN: CPT | Mod: PBBFAC,25 | Performed by: INTERNAL MEDICINE

## 2020-10-27 PROCEDURE — 71046 X-RAY EXAM CHEST 2 VIEWS: CPT | Mod: 26,,, | Performed by: RADIOLOGY

## 2020-10-27 PROCEDURE — 99397 PER PM REEVAL EST PAT 65+ YR: CPT | Mod: S$PBB,,, | Performed by: INTERNAL MEDICINE

## 2020-10-27 PROCEDURE — 71046 X-RAY EXAM CHEST 2 VIEWS: CPT | Mod: TC,FY

## 2020-10-27 PROCEDURE — 99397 PR PREVENTIVE VISIT,EST,65 & OVER: ICD-10-PCS | Mod: S$PBB,,, | Performed by: INTERNAL MEDICINE

## 2020-10-27 PROCEDURE — 71046 XR CHEST PA AND LATERAL: ICD-10-PCS | Mod: 26,,, | Performed by: RADIOLOGY

## 2020-10-27 PROCEDURE — 99999 PR PBB SHADOW E&M-EST. PATIENT-LVL IV: CPT | Mod: PBBFAC,,, | Performed by: INTERNAL MEDICINE
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
## 2020-10-27 NOTE — LETTER
October 28, 2020    Adolfo Vazquez  [address] [place]  [place] [address]             [facility] [address] [address]  [address]  [facility] [address]  Phone: [phone] Dear Adolfo,         Thank you for allowing me to serve you and perform your [facility] exam on 10/27/2020. This letter will serve as a brief summary of the physical findings and laboratory/studies performed and recommendations at this time. Except for your weight and a healing lumbar compression fracture this is a normal exam. It is imperative that you lose weight before your hips and knees will become the victim of your excess weight!!         If you have any questions or concerns, please don't hesitate to call.    Sincerely,        Rigo Paul MD

## 2020-11-09 DIAGNOSIS — G47.30 SLEEP APNEA, UNSPECIFIED TYPE: Primary | ICD-10-CM

## 2020-11-16 ENCOUNTER — HOSPITAL ENCOUNTER (OUTPATIENT)
Dept: RADIOLOGY | Facility: HOSPITAL | Age: 70
Discharge: HOME | End: 2020-11-16
Attending: NURSE PRACTITIONER
Payer: MEDICARE

## 2020-11-16 ENCOUNTER — OFFICE VISIT (OUTPATIENT)
Dept: ORTHOPEDICS | Facility: CLINIC | Age: 70
End: 2020-11-16
Payer: MEDICARE

## 2020-11-16 ENCOUNTER — OFFICE VISIT (OUTPATIENT)
Dept: SLEEP MEDICINE | Facility: CLINIC | Age: 70
End: 2020-11-16
Payer: MEDICARE

## 2020-11-16 VITALS
HEIGHT: 70 IN | DIASTOLIC BLOOD PRESSURE: 85 MMHG | HEART RATE: 65 BPM | BODY MASS INDEX: 37.56 KG/M2 | WEIGHT: 262.38 LBS | SYSTOLIC BLOOD PRESSURE: 162 MMHG

## 2020-11-16 VITALS
WEIGHT: 267.63 LBS | HEIGHT: 70 IN | DIASTOLIC BLOOD PRESSURE: 82 MMHG | HEART RATE: 73 BPM | SYSTOLIC BLOOD PRESSURE: 136 MMHG | BODY MASS INDEX: 38.31 KG/M2

## 2020-11-16 DIAGNOSIS — G47.30 SLEEP APNEA, UNSPECIFIED TYPE: ICD-10-CM

## 2020-11-16 DIAGNOSIS — M79.672 LEFT FOOT PAIN: ICD-10-CM

## 2020-11-16 DIAGNOSIS — M79.675 PAIN AND SWELLING OF TOE OF LEFT FOOT: Primary | ICD-10-CM

## 2020-11-16 DIAGNOSIS — M10.9 ACUTE GOUT OF LEFT FOOT, UNSPECIFIED CAUSE: ICD-10-CM

## 2020-11-16 DIAGNOSIS — F51.09 OTHER INSOMNIA NOT DUE TO A SUBSTANCE OR KNOWN PHYSIOLOGICAL CONDITION: ICD-10-CM

## 2020-11-16 DIAGNOSIS — R35.1 NOCTURIA: Primary | ICD-10-CM

## 2020-11-16 DIAGNOSIS — M79.89 PAIN AND SWELLING OF TOE OF LEFT FOOT: Primary | ICD-10-CM

## 2020-11-16 DIAGNOSIS — M79.672 LEFT FOOT PAIN: Primary | ICD-10-CM

## 2020-11-16 PROCEDURE — 99214 PR OFFICE/OUTPT VISIT, EST, LEVL IV, 30-39 MIN: ICD-10-PCS | Mod: S$PBB,,, | Performed by: NURSE PRACTITIONER

## 2020-11-16 PROCEDURE — 99999 PR PBB SHADOW E&M-EST. PATIENT-LVL III: ICD-10-PCS | Mod: PBBFAC,,, | Performed by: INTERNAL MEDICINE

## 2020-11-16 PROCEDURE — 73610 X-RAY EXAM OF ANKLE: CPT | Mod: 26,LT,, | Performed by: RADIOLOGY

## 2020-11-16 PROCEDURE — 99214 OFFICE O/P EST MOD 30 MIN: CPT | Mod: PBBFAC,25,27 | Performed by: NURSE PRACTITIONER

## 2020-11-16 PROCEDURE — 99999 PR PBB SHADOW E&M-EST. PATIENT-LVL IV: ICD-10-PCS | Mod: PBBFAC,,, | Performed by: NURSE PRACTITIONER

## 2020-11-16 PROCEDURE — 73610 XR ANKLE COMPLETE 3 VIEW LEFT: ICD-10-PCS | Mod: 26,LT,, | Performed by: RADIOLOGY

## 2020-11-16 PROCEDURE — 99214 OFFICE O/P EST MOD 30 MIN: CPT | Mod: S$PBB,,, | Performed by: NURSE PRACTITIONER

## 2020-11-16 PROCEDURE — 99204 PR OFFICE/OUTPT VISIT, NEW, LEVL IV, 45-59 MIN: ICD-10-PCS | Mod: S$PBB,,, | Performed by: INTERNAL MEDICINE

## 2020-11-16 PROCEDURE — 73630 X-RAY EXAM OF FOOT: CPT | Mod: TC,LT

## 2020-11-16 PROCEDURE — 73610 X-RAY EXAM OF ANKLE: CPT | Mod: TC,LT

## 2020-11-16 PROCEDURE — 99213 OFFICE O/P EST LOW 20 MIN: CPT | Mod: PBBFAC | Performed by: INTERNAL MEDICINE

## 2020-11-16 PROCEDURE — 73630 XR FOOT COMPLETE 3 VIEW LEFT: ICD-10-PCS | Mod: 26,LT,, | Performed by: RADIOLOGY

## 2020-11-16 PROCEDURE — 99204 OFFICE O/P NEW MOD 45 MIN: CPT | Mod: S$PBB,,, | Performed by: INTERNAL MEDICINE

## 2020-11-16 PROCEDURE — 73630 X-RAY EXAM OF FOOT: CPT | Mod: 26,LT,, | Performed by: RADIOLOGY

## 2020-11-16 PROCEDURE — 99999 PR PBB SHADOW E&M-EST. PATIENT-LVL III: CPT | Mod: PBBFAC,,, | Performed by: INTERNAL MEDICINE

## 2020-11-16 PROCEDURE — 99999 PR PBB SHADOW E&M-EST. PATIENT-LVL IV: CPT | Mod: PBBFAC,,, | Performed by: NURSE PRACTITIONER

## 2020-11-16 RX ORDER — PREDNISONE 20 MG/1
40 TABLET ORAL DAILY
Qty: 6 TABLET | Refills: 0 | Status: SHIPPED | OUTPATIENT
Start: 2020-11-16 | End: 2020-11-19

## 2020-11-16 RX ORDER — NAPROXEN 500 MG/1
500 TABLET ORAL 2 TIMES DAILY WITH MEALS
Qty: 20 TABLET | Refills: 0 | Status: SHIPPED | OUTPATIENT
Start: 2020-11-16 | End: 2021-07-08 | Stop reason: SDUPTHER

## 2020-11-16 RX ORDER — COLCHICINE 0.6 MG/1
0.6 TABLET ORAL DAILY
Qty: 3 TABLET | Refills: 0 | Status: SHIPPED | OUTPATIENT
Start: 2020-11-16 | End: 2021-02-17

## 2020-11-16 NOTE — PROGRESS NOTES
"NEW PATIENT VISIT    Adolfo Vazquez  is a pleasant 70 y.o. male with depression, hypertension, erectile dysfunction, s/p prostatectomy who presents for evaluation of witnessed apneas by his wife  Referred by [place], Rigo Ferraro, *     Sleep has been observed by the patient's spouse who has noted loud snoring and apneic events.    The patient ENDORSES  snoring, snoring arousals, unrefreshing sleep.    The patient has some sleepiness during the day , particularly  in the afternoon   Does have some sleepiness driving. only on rare occasiona Takes care to avoid driving when sleepy.     SLEEP SCHEDULE:  Bed Time:  7-9P  Environment:    Lights out:    Hypnotic:    Alcohol:    Sleep Latency: 15-30min  Arousals:  3  Back to sleep: 15 min  Wake time:  5A  Refreshed?    Weekend changes: WT 7-8A  Naps:   none  Nocturia:  2  Work schedule:     ESS:   5/24    ROS:  The patient notes the following symptoms:  difficulty breathing through the nose, morning dry mouth, shortness of breath, acid reflux, body aches and pains, dizziness, urge to move legs a lot  The remainder of a complete ROS was performed and is negative except as noted above.  The patient's allergies and medications were reviewed.  The patient's past medical, family, and social history were reviewed.    Vitals:    11/16/20 0900   BP: (!) 162/85   Pulse: 65   Weight: 119 kg (262 lb 5.6 oz)   Height: 5' 10" (1.778 m)     Physical Exam:  GEN:   Well-appearing, vitals reviewed  SKIN:  No rash on the face or bridge of the nose  EYES:  No icterus, pupils equal  HEENT: Mallampati 2  + crowded airway  CV:  Regular rate and rhythm    trace lower extremity edema  RESP:  Normal effort    crackles at the bases which clear with deep inspiration  MSK:  Normal gait and station  Psych:  Appropriate affect, demonstrates insight  Neuro:  no resting tremor    RECORDS REVIEWED TODAY:    No prior sleep testing.    ASSESSMENT:       POSSIBLE MAU: Comorbidities: HTN .  STOP BANG " positive predictors:  Loud snoring, Tired during the day, Observed apneas, HTN, BMI > 35, Age > 50, Neck circumference >16 inches, Male .  TOTAL = 8/8.    SLEEPINESS: does not meet criteria for hypersomnolence     NOCTURIA: underlying MAU may be contributing    ASSOCIATED CONDITIONS: hypertension    PLAN:    -will start with home sleep testing (HST) given the patient's high pretest probability of MAU. If home sleep testing is inconclusive, will need an in-lab diagnostic study to definitively exclude MAU.   -we discussed trial of PAP therapy if MAU is present  -the patient was warned to never drive when sleepy  -interested in trying nasal interface     -will arrange RTC depending on results of sleep testing.    The patient was counseled on the pathophysiology of obstructive sleep apnea, cardiovascular risks of untreated MAU and mask and machine desensitization    The patient was given open opportunity to ask questions and/or express concerns about treatment plan.   All questions/concerns were discussed.     Two patient identifiers used prior to evaluation.     Preferred method of contact: [email or web address]

## 2020-11-16 NOTE — LETTER
November 16, 2020      Rigo Paul MD  [address]  [facility] [address]           [id number]  [address]  [facility] [address]  Phone: [phone]          Patient: Adolfo Vazquez   MR Number: [id number]   YOB: 1950   Date of Visit: 11/16/2020       Dear Dr. Rigo Paul:    Thank you for referring Adolfo Vazquez to me for evaluation. Attached you will find relevant portions of my assessment and plan of care.    If you have questions, please do not hesitate to call me. I look forward to following Adolfo Vazquez along with you.    Sincerely,    Olegario Yeager MD    Enclosure  CC:  No Recipients    If you would like to receive this communication electronically, please contact [email or web address] or [phone] to request more information on [employer] Link access.    For providers and/or their staff who would like to refer a patient to [facility], please contact us through our one-stop-shop provider referral line, [facility], at [phone].    If you feel you have received this communication in error or would no longer like to receive these types of communications, please e-mail [email or web address]

## 2020-11-17 NOTE — PROGRESS NOTES
SUBJECTIVE:     Chief Complaint & History of Present Illness:  Adolfo Vazquez is a Established 70 y.o. year old male patient here for intermittent left foot/ankle pain which has been present for approximately 3 weeks.  There is not a history of injury.  The pain is located in the medial, lateral and dorsal aspect of the foot/ankle.  The pain is described as achy, 4/10.  It is aggravated by certain movement of the foot.  There is not radiation, numbness or tingling into the toes.  Associated symptoms include discomfort. Previous treatments include rest, ice and elevation which have provided minimal relief.  There is not a history of previous injury or surgery to the foot.   The patient does not use an assistive device.    Patient reports intermittent swelling of his foot and ankle with associated pain.  Denies fever or chills.  No reported history of gout.      Review of patient's allergies indicates:  No Known Allergies      Current Outpatient Medications   Medication Sig Dispense Refill    ABILIFY 2 mg Tab 2 mg once daily.       alprazolam (XANAX) 0.5 MG tablet 0.5 mg 2 (two) times daily as needed.       azelastine (ASTELIN) 137 mcg (0.1 %) nasal spray 1 spray once daily.      buPROPion (WELLBUTRIN XL) 300 MG 24 hr tablet       CYMBALTA 60 mg capsule Take 60 mg by mouth once daily.       FLUZONE HIGH-DOSE 2016-17, PF, 180 mcg/0.5 mL Syrg 1 Dose once.  0    losartan-hydrochlorothiazide 50-12.5 mg (HYZAAR) 50-12.5 mg per tablet TAKE 1 TABLET DAILY 90 tablet 3    montelukast (SINGULAIR) 10 mg tablet 1 tablet nightly.      oxyCODONE-acetaminophen (PERCOCET) 5-325 mg per tablet 1 tablet 2 (two) times daily as needed.      simvastatin (ZOCOR) 40 MG tablet Take 1 tablet (40 mg total) by mouth nightly. 90 tablet 2    trazodone (DESYREL) 100 MG tablet 100 mg nightly as needed.       colchicine (COLCRYS) 0.6 mg tablet Take 1 tablet (0.6 mg total) by mouth once daily. for 3 days 3 tablet 0    naproxen (EC  "NAPROSYN) 500 MG EC tablet Take 1 tablet (500 mg total) by mouth 2 (two) times daily with meals. 20 tablet 0    predniSONE (DELTASONE) 20 MG tablet Take 2 tablets (40 mg total) by mouth once daily. for 3 days 6 tablet 0     No current facility-administered medications for this visit.        Past Medical History:   Diagnosis Date    Anxiety     Colon polyp     benign    Depression     Elevated PSA     High cholesterol     Hypertension     Prostate cancer        Past Surgical History:   Procedure Laterality Date    COLON SURGERY      COLONOSCOPY N/A 1/24/2019    Procedure: COLONOSCOPY;  Surgeon: Clifton Whiting MD;  Location: [address];  Service: Endoscopy;  Laterality: N/A;    PROSTATE SURGERY      SKIN CANCER EXCISION         Family History   Problem Relation Age of Onset    Cancer Father         esophageal CA    Emphysema Mother     Coronary artery disease Maternal Grandfather     Cancer Maternal Uncle          Review of Systems:  ROS:  Constitutional: no fever or chills  Eyes: no visual changes  ENT: no nasal congestion or sore throat  Respiratory: no cough or shortness of breath  Cardiovascular: no chest pain or palpitations  Gastrointestinal: no nausea or vomiting, tolerating diet  Genitourinary: no hematuria or dysuria  Integument/Breast: no rash or pruritis  Hematologic/Lymphatic: no easy bruising or lymphadenopathy  Musculoskeletal: positive for arthralgias  Neurological: no seizures or tremors  Behavioral/Psych: no auditory or visual hallucinations  Endocrine: no heat or cold intolerance      OBJECTIVE:     PHYSICAL EXAM:  Vital Signs (Most Recent)  Vitals:    11/16/20 1323   BP: 136/82   Pulse: 73     Height: 5' 10" (177.8 cm) Weight: 121.4 kg (267 lb 10.2 oz),   General Appearance: Well nourished, well developed, in no acute distress.  HENT: Normal cephalic, oropharynx pink and moist  Eyes: PERRLA bilaterally and EOM x 4  Respiratory: Even and unlabored  Skin: Warm and Dry. " There is flaky peeling skin along the base of the foot and in between the toes  Psychiatric: AAO x 4, Mood & affect are normal.    left  Foot/Ankle    General appearance: no acute distress, alert/oriented x3, appropriate mood and affect, looks stated age, obese and well nourished  The examination was performed out of splint/cast  Skin: skin is dry and flaky along the base of foot and in between the toes.  Swelling: none  Warmth: no warmth  Tenderness: diffuse  ROM: normal  Strength: normal  Gait: normal  Stability: stable to testing and Cotton test: negative  Crepitus: no  Neurological Exam: normal  Vascular Exam: normal and pulse present    normal exam, no swelling, tenderness, instability; ligaments intact, full range of motion of all ankle/foot joints      RADIOGRAPHS:  X-ray of left foot and ankle obtained and personally reviewed by me.  No acute fractures seen.  Ankle mortise is symmetrical and aligned.  There is no ankle widening.  On foot he has an os trigonum and a spur on his calcaneous.    ASSESSMENT/PLAN:       ICD-10-CM ICD-9-CM   1. Pain and swelling of toe of left foot  M79.675 729.5    M79.89 729.81   2. Acute gout of left foot, unspecified cause  M10.9 274.01       Plan:  -Adolfo Vazquez presents to clinic today with c/c left foot/ankle pain for the past 3 weeks.  -X-ray as above.  -Recommend RICE therapy.  -Patient sent to lab for uric acid.  Value is 7.3.  Gout is suspected.  Will give patient 3 days of Colchicine and Prednisone to see if his symptoms improve, if so would recommend he follow up with Rheumatology.  -Will also prescribe Naproxen 500 mg bid x 10 days, he is to begin after steroid treatment if pain persist.  -Recommend he speak with his dermatologist of the flaky skin on his feet as this appears to be tinea pedis.  He reports he has used steroid cream in past with no resolution.  This usually does not treat tinea and would need an antifungal cream.  Will defer to Derm.  -Call if pain  persist or worsens.

## 2020-11-19 ENCOUNTER — PATIENT MESSAGE (OUTPATIENT)
Dept: ORTHOPEDICS | Facility: CLINIC | Age: 70
End: 2020-11-19

## 2020-11-25 ENCOUNTER — OFFICE VISIT (OUTPATIENT)
Dept: PODIATRY | Facility: CLINIC | Age: 70
End: 2020-11-25
Payer: MEDICARE

## 2020-11-25 ENCOUNTER — TELEPHONE (OUTPATIENT)
Dept: SLEEP MEDICINE | Facility: OTHER | Age: 70
End: 2020-11-25

## 2020-11-25 VITALS
SYSTOLIC BLOOD PRESSURE: 172 MMHG | DIASTOLIC BLOOD PRESSURE: 93 MMHG | WEIGHT: 267.63 LBS | HEART RATE: 65 BPM | BODY MASS INDEX: 38.31 KG/M2 | HEIGHT: 70 IN

## 2020-11-25 DIAGNOSIS — B35.1 ONYCHOMYCOSIS DUE TO DERMATOPHYTE: Primary | ICD-10-CM

## 2020-11-25 DIAGNOSIS — B35.3 TINEA PEDIS OF BOTH FEET: ICD-10-CM

## 2020-11-25 LAB — KOH PREP SPEC: NORMAL

## 2020-11-25 PROCEDURE — 99213 OFFICE O/P EST LOW 20 MIN: CPT | Mod: PBBFAC,PN | Performed by: PODIATRIST

## 2020-11-25 PROCEDURE — 99213 OFFICE O/P EST LOW 20 MIN: CPT | Mod: S$PBB,,, | Performed by: PODIATRIST

## 2020-11-25 PROCEDURE — 99213 PR OFFICE/OUTPT VISIT, EST, LEVL III, 20-29 MIN: ICD-10-PCS | Mod: S$PBB,,, | Performed by: PODIATRIST

## 2020-11-25 PROCEDURE — 87107 FUNGI IDENTIFICATION MOLD: CPT

## 2020-11-25 PROCEDURE — 87101 SKIN FUNGI CULTURE: CPT

## 2020-11-25 PROCEDURE — 99999 PR PBB SHADOW E&M-EST. PATIENT-LVL III: CPT | Mod: PBBFAC,,, | Performed by: PODIATRIST

## 2020-11-25 PROCEDURE — 99999 PR PBB SHADOW E&M-EST. PATIENT-LVL III: ICD-10-PCS | Mod: PBBFAC,,, | Performed by: PODIATRIST

## 2020-11-25 PROCEDURE — 87210 SMEAR WET MOUNT SALINE/INK: CPT

## 2020-11-25 RX ORDER — CICLOPIROX OLAMINE 7.7 MG/G
CREAM TOPICAL 2 TIMES DAILY
Qty: 90 G | Refills: 2 | Status: SHIPPED | OUTPATIENT
Start: 2020-11-25 | End: 2022-01-12

## 2020-11-25 RX ORDER — LEVOCETIRIZINE DIHYDROCHLORIDE 5 MG/1
TABLET, FILM COATED ORAL
COMMUNITY
Start: 2020-11-17 | End: 2022-02-22

## 2020-11-25 NOTE — PROGRESS NOTES
Subjective:      Patient ID: Adolfo Vazquez is a 70 y.o. male.    Chief Complaint: Nail Problem (pt states he has nail fungus ) and Foot Pain (pt states he has gout in his left foot)    Dry flaking itching skin both feet. Gradual onset, worsening over past several weeks, aggravated by increased weight bearing, shoe gear, pressure.  No previous medical treatment.  OTC med not helping.         Sec chief complaint:  Thick discolored misshapen toenails all 10 toes.Gradual onset, worsening over past several weeks, aggravated by increased weight bearing, shoe gear, pressure.  No previous medical treatment.  OTC  med not helping.     Review of Systems   Constitution: Negative for chills, diaphoresis, fever, malaise/fatigue and night sweats.   Cardiovascular: Negative for claudication, cyanosis, leg swelling and syncope.   Skin: Positive for dry skin, itching, nail changes and rash. Negative for color change, suspicious lesions and unusual hair distribution.   Musculoskeletal: Negative for falls, joint pain, joint swelling, muscle cramps, muscle weakness and stiffness.   Gastrointestinal: Negative for constipation, diarrhea, nausea and vomiting.   Neurological: Negative for brief paralysis, disturbances in coordination, focal weakness, numbness, paresthesias, sensory change and tremors.           Objective:      Physical Exam  Constitutional:       General: He is not in acute distress.     Appearance: He is well-developed. He is not diaphoretic.   Cardiovascular:      Pulses:           Popliteal pulses are 2+ on the right side and 2+ on the left side.        Dorsalis pedis pulses are 2+ on the right side and 2+ on the left side.        Posterior tibial pulses are 2+ on the right side and 2+ on the left side.      Comments: Capillary refill 3 seconds all toes/distal feet, all toes/both feet warm to touch.      Negative lymphadenopathy bilateral popliteal fossa and tarsal tunnel.      Negavie lower extremity edema  bilateral.    Musculoskeletal:      Right ankle: He exhibits normal range of motion, no swelling, no ecchymosis, no deformity, no laceration and normal pulse. Achilles tendon normal. Achilles tendon exhibits no pain, no defect and normal Dang's test results.      Comments: Normal angle, base, station of gait. All ten toes without clubbing, cyanosis, or signs of ischemia.  No pain to palpation bilateral lower extremities.  Range of motion, stability, muscle strength, and muscle tone normal bilateral feet and legs.    Lymphadenopathy:      Lower Body: No right inguinal adenopathy. No left inguinal adenopathy.      Comments: Negative lymphadenopathy bilateral popliteal fossa and tarsal tunnel.    Negative lymphangitic streaking bilateral feet/ankles/legs.   Skin:     General: Skin is warm and dry.      Capillary Refill: Capillary refill takes 2 to 3 seconds.      Coloration: Skin is not pale.      Findings: No abrasion, bruising, burn, ecchymosis, erythema, laceration, lesion or rash.      Nails: There is no clubbing.        Comments:   Dry scale with superficial flakes over an erythematous base dorsal and plantar surfaces of both feet.  without ulceration, drainage, pus, tracking, fluctuance, malodor, or cardinal signs infection.    Toenails 1st, 2nd, 3rd, 4th, 5th  bilateral are hypertrophic thickened 2-3 mm, dystrophic, discolored tanish brown with tan, gray crumbly subungual debris.  Tender to distal nail plate pressure, without periungual skin abnormality of each.    Otherwise, Skin is normal age and health appropriate color, turgor, texture, and temperature bilateral lower extremities without ulceration, hyperpigmentation, discoloration, masses nodules or cords palpated.  No ecchymosis, erythema, edema, or cardinal signs of infection bilateral lower extremities.     Neurological:      Mental Status: He is alert and oriented to person, place, and time.      Sensory: No sensory deficit.      Motor: No tremor,  atrophy or abnormal muscle tone.      Gait: Gait normal.      Deep Tendon Reflexes:      Reflex Scores:       Patellar reflexes are 2+ on the right side and 2+ on the left side.       Achilles reflexes are 2+ on the right side and 2+ on the left side.     Comments: Negative tinel sign to percussion sural, superficial peroneal, deep peroneal, saphenous, and posterior tibial nerves right and left ankles and feet.     Psychiatric:         Behavior: Behavior is cooperative.               Assessment:       Encounter Diagnoses   Name Primary?    Onychomycosis due to dermatophyte Yes    Tinea pedis of both feet          Plan:       Adolfo was seen today for nail problem and foot pain.    Diagnoses and all orders for this visit:    Onychomycosis due to dermatophyte  -     KOH prep  -     CULTURE, FUNGUS - SKIN, HAIR, OR NAILS    Tinea pedis of both feet  -     KOH prep  -     CULTURE, FUNGUS - SKIN, HAIR, OR NAILS    Other orders  -     ciclopirox (LOPROX) 0.77 % Crea; Apply topically 2 (two) times daily.      I counseled the patient on his conditions, their implications and medical management.        Clipping some nail sent for KOH prep and fungal culture.    Prescribed Loprox cream for twice daily application to the tire surfaces of both feet following 2 weeks of topical urea cream and pumice stone.    Antifungal treatment topically should last until the prescription is gone or 6-8 weeks whichever is the longest.          No follow-ups on file.

## 2020-11-27 ENCOUNTER — TELEPHONE (OUTPATIENT)
Dept: PODIATRY | Facility: CLINIC | Age: 70
End: 2020-11-27

## 2020-11-27 NOTE — TELEPHONE ENCOUNTER
Patient he does have some fungus in the in the samples.  Treating with antifungal makes sense.  Continue current treatment and follow-up as needed      Called and spoke with patient and give test results. Patient communicated understanding.

## 2020-11-30 ENCOUNTER — PATIENT MESSAGE (OUTPATIENT)
Dept: SLEEP MEDICINE | Facility: CLINIC | Age: 70
End: 2020-11-30

## 2020-12-04 ENCOUNTER — TELEPHONE (OUTPATIENT)
Dept: SLEEP MEDICINE | Facility: OTHER | Age: 70
End: 2020-12-04

## 2020-12-11 ENCOUNTER — PATIENT MESSAGE (OUTPATIENT)
Dept: OTHER | Facility: OTHER | Age: 70
End: 2020-12-11

## 2020-12-16 ENCOUNTER — TELEPHONE (OUTPATIENT)
Dept: SLEEP MEDICINE | Facility: OTHER | Age: 70
End: 2020-12-16

## 2020-12-17 ENCOUNTER — HOSPITAL ENCOUNTER (OUTPATIENT)
Dept: SLEEP MEDICINE | Facility: OTHER | Age: 70
Discharge: HOME | End: 2020-12-17
Attending: INTERNAL MEDICINE
Payer: MEDICARE

## 2020-12-17 DIAGNOSIS — F51.09 OTHER INSOMNIA NOT DUE TO A SUBSTANCE OR KNOWN PHYSIOLOGICAL CONDITION: ICD-10-CM

## 2020-12-17 PROCEDURE — 95800 SLP STDY UNATTENDED: CPT

## 2020-12-17 NOTE — Clinical Note
Home sleep study shows very severe MAU (AHI=59)  I will work with him to try (and hopefully adhere to) CPAP  Sony

## 2020-12-28 ENCOUNTER — PATIENT MESSAGE (OUTPATIENT)
Dept: SLEEP MEDICINE | Facility: CLINIC | Age: 70
End: 2020-12-28

## 2021-01-04 LAB — FUNGUS BLD CULT: NORMAL

## 2021-01-08 ENCOUNTER — TELEPHONE (OUTPATIENT)
Dept: SLEEP MEDICINE | Facility: CLINIC | Age: 71
End: 2021-01-08

## 2021-01-20 ENCOUNTER — TELEPHONE (OUTPATIENT)
Dept: SLEEP MEDICINE | Facility: CLINIC | Age: 71
End: 2021-01-20

## 2021-01-20 DIAGNOSIS — G47.33 OSA (OBSTRUCTIVE SLEEP APNEA): Primary | ICD-10-CM

## 2021-01-20 DIAGNOSIS — R05.9 COUGH: ICD-10-CM

## 2021-01-22 ENCOUNTER — PATIENT MESSAGE (OUTPATIENT)
Dept: ADMINISTRATIVE | Facility: OTHER | Age: 71
End: 2021-01-22

## 2021-02-13 ENCOUNTER — IMMUNIZATION (OUTPATIENT)
Dept: PHARMACY | Facility: CLINIC | Age: 71
End: 2021-02-13
Payer: MEDICARE

## 2021-02-13 DIAGNOSIS — Z23 NEED FOR VACCINATION: Primary | ICD-10-CM

## 2021-02-17 ENCOUNTER — OFFICE VISIT (OUTPATIENT)
Dept: PODIATRY | Facility: CLINIC | Age: 71
End: 2021-02-17
Payer: MEDICARE

## 2021-02-17 VITALS
SYSTOLIC BLOOD PRESSURE: 169 MMHG | HEIGHT: 70 IN | DIASTOLIC BLOOD PRESSURE: 84 MMHG | BODY MASS INDEX: 38.31 KG/M2 | WEIGHT: 267.63 LBS | HEART RATE: 85 BPM

## 2021-02-17 DIAGNOSIS — M79.671 FOOT PAIN, RIGHT: Primary | ICD-10-CM

## 2021-02-17 DIAGNOSIS — M10.00 ACUTE IDIOPATHIC GOUT, UNSPECIFIED SITE: ICD-10-CM

## 2021-02-17 PROCEDURE — 99214 PR OFFICE/OUTPT VISIT, EST, LEVL IV, 30-39 MIN: ICD-10-PCS | Mod: S$GLB,,, | Performed by: PODIATRIST

## 2021-02-17 PROCEDURE — 99214 OFFICE O/P EST MOD 30 MIN: CPT | Mod: S$GLB,,, | Performed by: PODIATRIST

## 2021-02-17 PROCEDURE — 99999 PR PBB SHADOW E&M-EST. PATIENT-LVL IV: CPT | Mod: PBBFAC,,, | Performed by: PODIATRIST

## 2021-02-17 PROCEDURE — 99214 OFFICE O/P EST MOD 30 MIN: CPT | Mod: PBBFAC,PN | Performed by: PODIATRIST

## 2021-02-17 PROCEDURE — 99999 PR PBB SHADOW E&M-EST. PATIENT-LVL IV: ICD-10-PCS | Mod: PBBFAC,,, | Performed by: PODIATRIST

## 2021-02-17 RX ORDER — COLCHICINE 0.6 MG/1
0.6 TABLET ORAL DAILY
Qty: 15 TABLET | Refills: 0 | Status: SHIPPED | OUTPATIENT
Start: 2021-02-17 | End: 2021-03-17 | Stop reason: SDUPTHER

## 2021-02-18 ENCOUNTER — LAB VISIT (OUTPATIENT)
Dept: LAB | Facility: HOSPITAL | Age: 71
End: 2021-02-18
Attending: PODIATRIST
Payer: MEDICARE

## 2021-02-18 DIAGNOSIS — M10.00 ACUTE IDIOPATHIC GOUT, UNSPECIFIED SITE: ICD-10-CM

## 2021-02-18 DIAGNOSIS — M79.671 FOOT PAIN, RIGHT: ICD-10-CM

## 2021-02-18 LAB
ANION GAP SERPL CALC-SCNC: 8 MMOL/L (ref 8–16)
BASOPHILS # BLD AUTO: 0.06 K/UL (ref 0–0.2)
BASOPHILS NFR BLD: 0.8 % (ref 0–1.9)
BUN SERPL-MCNC: 19 MG/DL (ref 8–23)
CALCIUM SERPL-MCNC: 9.7 MG/DL (ref 8.7–10.5)
CHLORIDE SERPL-SCNC: 103 MMOL/L (ref 95–110)
CO2 SERPL-SCNC: 30 MMOL/L (ref 23–29)
CREAT SERPL-MCNC: 1 MG/DL (ref 0.5–1.4)
CRP SERPL-MCNC: 0.17 MG/DL (ref 0–0.75)
DIFFERENTIAL METHOD: NORMAL
EOSINOPHIL # BLD AUTO: 0.3 K/UL (ref 0–0.5)
EOSINOPHIL NFR BLD: 4.2 % (ref 0–8)
ERYTHROCYTE [DISTWIDTH] IN BLOOD BY AUTOMATED COUNT: 12.7 % (ref 11.5–14.5)
ERYTHROCYTE [SEDIMENTATION RATE] IN BLOOD BY WESTERGREN METHOD: 15 MM/HR (ref 0–10)
EST. GFR  (AFRICAN AMERICAN): >60 ML/MIN/1.73 M^2
EST. GFR  (NON AFRICAN AMERICAN): >60 ML/MIN/1.73 M^2
GLUCOSE SERPL-MCNC: 84 MG/DL (ref 70–110)
HCT VFR BLD AUTO: 44.1 % (ref 40–54)
HGB BLD-MCNC: 14.5 G/DL (ref 14–18)
IMM GRANULOCYTES # BLD AUTO: 0.04 K/UL (ref 0–0.04)
IMM GRANULOCYTES NFR BLD AUTO: 0.5 % (ref 0–0.5)
LYMPHOCYTES # BLD AUTO: 1.4 K/UL (ref 1–4.8)
LYMPHOCYTES NFR BLD: 19.3 % (ref 18–48)
MCH RBC QN AUTO: 30.9 PG (ref 27–31)
MCHC RBC AUTO-ENTMCNC: 32.9 G/DL (ref 32–36)
MCV RBC AUTO: 94 FL (ref 82–98)
MONOCYTES # BLD AUTO: 0.7 K/UL (ref 0.3–1)
MONOCYTES NFR BLD: 9.8 % (ref 4–15)
NEUTROPHILS # BLD AUTO: 4.8 K/UL (ref 1.8–7.7)
NEUTROPHILS NFR BLD: 65.4 % (ref 38–73)
NRBC BLD-RTO: 0 /100 WBC
PLATELET # BLD AUTO: 256 K/UL (ref 150–350)
PMV BLD AUTO: 9.9 FL (ref 9.2–12.9)
POTASSIUM SERPL-SCNC: 3.9 MMOL/L (ref 3.5–5.1)
RBC # BLD AUTO: 4.69 M/UL (ref 4.6–6.2)
SODIUM SERPL-SCNC: 141 MMOL/L (ref 136–145)
URATE SERPL-MCNC: 8.1 MG/DL (ref 3.4–7)
WBC # BLD AUTO: 7.32 K/UL (ref 3.9–12.7)

## 2021-02-18 PROCEDURE — 85025 COMPLETE CBC W/AUTO DIFF WBC: CPT

## 2021-02-18 PROCEDURE — 85651 RBC SED RATE NONAUTOMATED: CPT

## 2021-02-18 PROCEDURE — 86140 C-REACTIVE PROTEIN: CPT

## 2021-02-18 PROCEDURE — 84550 ASSAY OF BLOOD/URIC ACID: CPT

## 2021-02-18 PROCEDURE — 80048 BASIC METABOLIC PNL TOTAL CA: CPT

## 2021-02-18 PROCEDURE — 36415 COLL VENOUS BLD VENIPUNCTURE: CPT

## 2021-02-23 ENCOUNTER — PATIENT MESSAGE (OUTPATIENT)
Dept: PULMONOLOGY | Facility: CLINIC | Age: 71
End: 2021-02-23

## 2021-02-23 DIAGNOSIS — E78.5 HYPERLIPIDEMIA, UNSPECIFIED HYPERLIPIDEMIA TYPE: ICD-10-CM

## 2021-02-23 RX ORDER — SIMVASTATIN 40 MG/1
40 TABLET, FILM COATED ORAL NIGHTLY
Qty: 90 TABLET | Refills: 3 | Status: SHIPPED | OUTPATIENT
Start: 2021-02-23 | End: 2021-06-24 | Stop reason: SDUPTHER

## 2021-02-26 ENCOUNTER — TELEPHONE (OUTPATIENT)
Dept: SLEEP MEDICINE | Facility: OTHER | Age: 71
End: 2021-02-26

## 2021-03-05 ENCOUNTER — TELEPHONE (OUTPATIENT)
Dept: SLEEP MEDICINE | Facility: OTHER | Age: 71
End: 2021-03-05

## 2021-03-13 ENCOUNTER — IMMUNIZATION (OUTPATIENT)
Dept: PHARMACY | Facility: CLINIC | Age: 71
End: 2021-03-13
Payer: MEDICARE

## 2021-03-13 DIAGNOSIS — Z23 NEED FOR VACCINATION: Primary | ICD-10-CM

## 2021-03-17 ENCOUNTER — OFFICE VISIT (OUTPATIENT)
Dept: ORTHOPEDICS | Facility: CLINIC | Age: 71
End: 2021-03-17
Payer: COMMERCIAL

## 2021-03-17 ENCOUNTER — HOSPITAL ENCOUNTER (OUTPATIENT)
Dept: RADIOLOGY | Facility: HOSPITAL | Age: 71
Discharge: HOME | End: 2021-03-17
Attending: NURSE PRACTITIONER
Payer: COMMERCIAL

## 2021-03-17 VITALS
HEART RATE: 86 BPM | HEIGHT: 70 IN | DIASTOLIC BLOOD PRESSURE: 78 MMHG | SYSTOLIC BLOOD PRESSURE: 130 MMHG | BODY MASS INDEX: 38.31 KG/M2 | WEIGHT: 267.63 LBS

## 2021-03-17 DIAGNOSIS — M25.562 LEFT KNEE PAIN, UNSPECIFIED CHRONICITY: Primary | ICD-10-CM

## 2021-03-17 DIAGNOSIS — M10.9 GOUT OF MULTIPLE SITES, UNSPECIFIED CAUSE, UNSPECIFIED CHRONICITY: Primary | ICD-10-CM

## 2021-03-17 DIAGNOSIS — M25.562 LEFT KNEE PAIN, UNSPECIFIED CHRONICITY: ICD-10-CM

## 2021-03-17 PROCEDURE — 73562 XR KNEE ORTHO LEFT WITH FLEXION: ICD-10-PCS | Mod: 26,RT,, | Performed by: RADIOLOGY

## 2021-03-17 PROCEDURE — 99999 PR PBB SHADOW E&M-EST. PATIENT-LVL IV: ICD-10-PCS | Mod: PBBFAC,,, | Performed by: NURSE PRACTITIONER

## 2021-03-17 PROCEDURE — 73562 X-RAY EXAM OF KNEE 3: CPT | Mod: 26,RT,, | Performed by: RADIOLOGY

## 2021-03-17 PROCEDURE — 73564 X-RAY EXAM KNEE 4 OR MORE: CPT | Mod: 26,LT,, | Performed by: RADIOLOGY

## 2021-03-17 PROCEDURE — 99214 OFFICE O/P EST MOD 30 MIN: CPT | Mod: S$GLB,,, | Performed by: NURSE PRACTITIONER

## 2021-03-17 PROCEDURE — 73564 XR KNEE ORTHO LEFT WITH FLEXION: ICD-10-PCS | Mod: 26,LT,, | Performed by: RADIOLOGY

## 2021-03-17 PROCEDURE — 73564 X-RAY EXAM KNEE 4 OR MORE: CPT | Mod: TC,LT

## 2021-03-17 PROCEDURE — 99214 PR OFFICE/OUTPT VISIT, EST, LEVL IV, 30-39 MIN: ICD-10-PCS | Mod: S$GLB,,, | Performed by: NURSE PRACTITIONER

## 2021-03-17 PROCEDURE — 99999 PR PBB SHADOW E&M-EST. PATIENT-LVL IV: CPT | Mod: PBBFAC,,, | Performed by: NURSE PRACTITIONER

## 2021-03-17 PROCEDURE — 99214 OFFICE O/P EST MOD 30 MIN: CPT | Mod: PBBFAC,25 | Performed by: NURSE PRACTITIONER

## 2021-03-17 RX ORDER — COLCHICINE 0.6 MG/1
0.6 TABLET ORAL DAILY
Qty: 3 TABLET | Refills: 0 | Status: SHIPPED | OUTPATIENT
Start: 2021-03-17 | End: 2021-06-09

## 2021-03-17 RX ORDER — ALLOPURINOL 100 MG/1
100 TABLET ORAL DAILY
Qty: 30 TABLET | Refills: 0 | Status: SHIPPED | OUTPATIENT
Start: 2021-03-17 | End: 2021-04-16

## 2021-03-17 RX ORDER — METHYLPREDNISOLONE 4 MG/1
TABLET ORAL
Qty: 1 PACKAGE | Refills: 0 | Status: SHIPPED | OUTPATIENT
Start: 2021-03-17 | End: 2021-04-07

## 2021-04-09 ENCOUNTER — TELEPHONE (OUTPATIENT)
Dept: SLEEP MEDICINE | Facility: CLINIC | Age: 71
End: 2021-04-09

## 2021-04-12 ENCOUNTER — TELEPHONE (OUTPATIENT)
Dept: SLEEP MEDICINE | Facility: OTHER | Age: 71
End: 2021-04-12

## 2021-05-10 ENCOUNTER — LAB VISIT (OUTPATIENT)
Dept: FAMILY MEDICINE | Facility: CLINIC | Age: 71
End: 2021-05-10
Payer: MEDICARE

## 2021-05-10 DIAGNOSIS — R05.9 COUGH: ICD-10-CM

## 2021-05-10 PROCEDURE — U0005 INFEC AGEN DETEC AMPLI PROBE: HCPCS | Performed by: INTERNAL MEDICINE

## 2021-05-10 PROCEDURE — U0003 INFECTIOUS AGENT DETECTION BY NUCLEIC ACID (DNA OR RNA); SEVERE ACUTE RESPIRATORY SYNDROME CORONAVIRUS 2 (SARS-COV-2) (CORONAVIRUS DISEASE [COVID-19]), AMPLIFIED PROBE TECHNIQUE, MAKING USE OF HIGH THROUGHPUT TECHNOLOGIES AS DESCRIBED BY CMS-2020-01-R: HCPCS | Performed by: INTERNAL MEDICINE

## 2021-05-11 LAB — SARS-COV-2 RNA RESP QL NAA+PROBE: NOT DETECTED

## 2021-05-12 ENCOUNTER — TELEPHONE (OUTPATIENT)
Dept: SLEEP MEDICINE | Facility: OTHER | Age: 71
End: 2021-05-12

## 2021-05-13 ENCOUNTER — HOSPITAL ENCOUNTER (OUTPATIENT)
Dept: SLEEP MEDICINE | Facility: OTHER | Age: 71
Discharge: HOME | End: 2021-05-13
Attending: INTERNAL MEDICINE
Payer: MEDICARE

## 2021-05-13 DIAGNOSIS — G47.33 OSA (OBSTRUCTIVE SLEEP APNEA): ICD-10-CM

## 2021-05-13 PROCEDURE — 95811 POLYSOM 6/>YRS CPAP 4/> PARM: CPT

## 2021-05-13 PROCEDURE — 95811 PR POLYSOMNOGRAPHY W/CPAP: ICD-10-PCS | Mod: 26,,, | Performed by: INTERNAL MEDICINE

## 2021-05-13 PROCEDURE — 95811 POLYSOM 6/>YRS CPAP 4/> PARM: CPT | Mod: 26,,, | Performed by: INTERNAL MEDICINE

## 2021-05-20 ENCOUNTER — PATIENT MESSAGE (OUTPATIENT)
Dept: SLEEP MEDICINE | Facility: CLINIC | Age: 71
End: 2021-05-20

## 2021-05-20 DIAGNOSIS — G47.33 OSA (OBSTRUCTIVE SLEEP APNEA): Primary | ICD-10-CM

## 2021-06-01 ENCOUNTER — PATIENT MESSAGE (OUTPATIENT)
Dept: PULMONOLOGY | Facility: CLINIC | Age: 71
End: 2021-06-01

## 2021-06-01 DIAGNOSIS — E05.90 HYPERTHYROIDISM: ICD-10-CM

## 2021-06-01 RX ORDER — LEVOTHYROXINE SODIUM 100 UG/1
100 TABLET ORAL EVERY MORNING
Qty: 90 TABLET | Refills: 3 | Status: SHIPPED | OUTPATIENT
Start: 2021-06-01 | End: 2022-08-25

## 2021-06-08 ENCOUNTER — TELEPHONE (OUTPATIENT)
Dept: ADMINISTRATIVE | Facility: OTHER | Age: 71
End: 2021-06-08

## 2021-06-08 ENCOUNTER — TELEPHONE (OUTPATIENT)
Dept: SLEEP MEDICINE | Facility: CLINIC | Age: 71
End: 2021-06-08

## 2021-06-09 ENCOUNTER — OFFICE VISIT (OUTPATIENT)
Dept: SLEEP MEDICINE | Facility: CLINIC | Age: 71
End: 2021-06-09
Payer: MEDICARE

## 2021-06-09 DIAGNOSIS — G47.33 OSA (OBSTRUCTIVE SLEEP APNEA): Primary | ICD-10-CM

## 2021-06-09 DIAGNOSIS — R35.1 NOCTURIA: ICD-10-CM

## 2021-06-09 PROCEDURE — 99213 OFFICE O/P EST LOW 20 MIN: CPT | Mod: 95,,, | Performed by: INTERNAL MEDICINE

## 2021-06-09 PROCEDURE — 99213 PR OFFICE/OUTPT VISIT, EST, LEVL III, 20-29 MIN: ICD-10-PCS | Mod: 95,,, | Performed by: INTERNAL MEDICINE

## 2021-06-15 DIAGNOSIS — Z00.00 ROUTINE GENERAL MEDICAL EXAMINATION AT A HEALTH CARE FACILITY: Primary | ICD-10-CM

## 2021-06-24 ENCOUNTER — OFFICE VISIT (OUTPATIENT)
Dept: PULMONOLOGY | Facility: CLINIC | Age: 71
End: 2021-06-24
Payer: MEDICARE

## 2021-06-24 ENCOUNTER — CLINICAL SUPPORT (OUTPATIENT)
Dept: INTERNAL MEDICINE | Facility: CLINIC | Age: 71
End: 2021-06-24
Payer: MEDICARE

## 2021-06-24 ENCOUNTER — HOSPITAL ENCOUNTER (OUTPATIENT)
Dept: CARDIOLOGY | Facility: CLINIC | Age: 71
Discharge: HOME | End: 2021-06-24
Payer: MEDICARE

## 2021-06-24 VITALS
SYSTOLIC BLOOD PRESSURE: 156 MMHG | BODY MASS INDEX: 38.94 KG/M2 | HEART RATE: 61 BPM | HEIGHT: 70 IN | WEIGHT: 272 LBS | DIASTOLIC BLOOD PRESSURE: 96 MMHG

## 2021-06-24 DIAGNOSIS — Z00.00 ANNUAL PHYSICAL EXAM: Primary | ICD-10-CM

## 2021-06-24 DIAGNOSIS — R53.0 NEOPLASTIC MALIGNANT RELATED FATIGUE: ICD-10-CM

## 2021-06-24 DIAGNOSIS — R73.09 IMPAIRED GLUCOSE TOLERANCE TEST: ICD-10-CM

## 2021-06-24 DIAGNOSIS — R53.83 FATIGUE, UNSPECIFIED TYPE: ICD-10-CM

## 2021-06-24 DIAGNOSIS — Z00.00 ROUTINE GENERAL MEDICAL EXAMINATION AT A HEALTH CARE FACILITY: ICD-10-CM

## 2021-06-24 DIAGNOSIS — Z12.5 SPECIAL SCREENING FOR MALIGNANT NEOPLASM OF PROSTATE: ICD-10-CM

## 2021-06-24 DIAGNOSIS — E78.5 HYPERLIPIDEMIA, UNSPECIFIED HYPERLIPIDEMIA TYPE: Primary | ICD-10-CM

## 2021-06-24 DIAGNOSIS — R79.9 ABNORMAL BLOOD CHEMISTRY: ICD-10-CM

## 2021-06-24 LAB
ALBUMIN SERPL BCP-MCNC: 3.8 G/DL (ref 3.5–5.2)
ALP SERPL-CCNC: 73 U/L (ref 55–135)
ALT SERPL W/O P-5'-P-CCNC: 24 U/L (ref 10–44)
ANION GAP SERPL CALC-SCNC: 10 MMOL/L (ref 8–16)
AST SERPL-CCNC: 20 U/L (ref 10–40)
BILIRUB SERPL-MCNC: 0.3 MG/DL (ref 0.1–1)
BUN SERPL-MCNC: 14 MG/DL (ref 8–23)
CALCIUM SERPL-MCNC: 9.8 MG/DL (ref 8.7–10.5)
CHLORIDE SERPL-SCNC: 106 MMOL/L (ref 95–110)
CHOLEST SERPL-MCNC: 166 MG/DL (ref 120–199)
CHOLEST/HDLC SERPL: 2.9 {RATIO} (ref 2–5)
CK SERPL-CCNC: 79 U/L (ref 20–200)
CO2 SERPL-SCNC: 28 MMOL/L (ref 23–29)
COMPLEXED PSA SERPL-MCNC: <0.01 NG/ML (ref 0–4)
CREAT SERPL-MCNC: 0.9 MG/DL (ref 0.5–1.4)
ERYTHROCYTE [DISTWIDTH] IN BLOOD BY AUTOMATED COUNT: 13.4 % (ref 11.5–14.5)
ERYTHROCYTE [SEDIMENTATION RATE] IN BLOOD BY WESTERGREN METHOD: 16 MM/HR (ref 0–23)
EST. GFR  (AFRICAN AMERICAN): >60 ML/MIN/1.73 M^2
EST. GFR  (NON AFRICAN AMERICAN): >60 ML/MIN/1.73 M^2
ESTIMATED AVG GLUCOSE: 103 MG/DL (ref 68–131)
GLUCOSE SERPL-MCNC: 81 MG/DL (ref 70–110)
HBA1C MFR BLD: 5.2 % (ref 4–5.6)
HCT VFR BLD AUTO: 43.9 % (ref 40–54)
HDLC SERPL-MCNC: 57 MG/DL (ref 40–75)
HDLC SERPL: 34.3 % (ref 20–50)
HGB BLD-MCNC: 14.4 G/DL (ref 14–18)
LDLC SERPL CALC-MCNC: 82.8 MG/DL (ref 63–159)
MCH RBC QN AUTO: 31.7 PG (ref 27–31)
MCHC RBC AUTO-ENTMCNC: 32.8 G/DL (ref 32–36)
MCV RBC AUTO: 97 FL (ref 82–98)
NONHDLC SERPL-MCNC: 109 MG/DL
PLATELET # BLD AUTO: 260 K/UL (ref 150–450)
PMV BLD AUTO: 9.9 FL (ref 9.2–12.9)
POTASSIUM SERPL-SCNC: 4.3 MMOL/L (ref 3.5–5.1)
PROT SERPL-MCNC: 7.1 G/DL (ref 6–8.4)
RBC # BLD AUTO: 4.54 M/UL (ref 4.6–6.2)
SODIUM SERPL-SCNC: 144 MMOL/L (ref 136–145)
TRIGL SERPL-MCNC: 131 MG/DL (ref 30–150)
TSH SERPL DL<=0.005 MIU/L-ACNC: 1.95 UIU/ML (ref 0.4–4)
WBC # BLD AUTO: 8.43 K/UL (ref 3.9–12.7)

## 2021-06-24 PROCEDURE — 82550 ASSAY OF CK (CPK): CPT | Performed by: INTERNAL MEDICINE

## 2021-06-24 PROCEDURE — 85027 COMPLETE CBC AUTOMATED: CPT | Performed by: INTERNAL MEDICINE

## 2021-06-24 PROCEDURE — 93005 ELECTROCARDIOGRAM TRACING: CPT | Mod: PBBFAC | Performed by: INTERNAL MEDICINE

## 2021-06-24 PROCEDURE — 84443 ASSAY THYROID STIM HORMONE: CPT | Performed by: INTERNAL MEDICINE

## 2021-06-24 PROCEDURE — 93010 EKG 12-LEAD: ICD-10-PCS | Mod: S$PBB,,, | Performed by: INTERNAL MEDICINE

## 2021-06-24 PROCEDURE — 99999 PR PBB SHADOW E&M-EST. PATIENT-LVL II: ICD-10-PCS | Mod: PBBFAC,,, | Performed by: INTERNAL MEDICINE

## 2021-06-24 PROCEDURE — 99397 PER PM REEVAL EST PAT 65+ YR: CPT | Mod: S$PBB,,, | Performed by: INTERNAL MEDICINE

## 2021-06-24 PROCEDURE — 80053 COMPREHEN METABOLIC PANEL: CPT | Performed by: INTERNAL MEDICINE

## 2021-06-24 PROCEDURE — 80061 LIPID PANEL: CPT | Performed by: INTERNAL MEDICINE

## 2021-06-24 PROCEDURE — 99999 PR PBB SHADOW E&M-EST. PATIENT-LVL II: CPT | Mod: PBBFAC,,, | Performed by: INTERNAL MEDICINE

## 2021-06-24 PROCEDURE — 99212 OFFICE O/P EST SF 10 MIN: CPT | Mod: PBBFAC,25 | Performed by: INTERNAL MEDICINE

## 2021-06-24 PROCEDURE — 83036 HEMOGLOBIN GLYCOSYLATED A1C: CPT | Performed by: INTERNAL MEDICINE

## 2021-06-24 PROCEDURE — 85652 RBC SED RATE AUTOMATED: CPT | Performed by: INTERNAL MEDICINE

## 2021-06-24 PROCEDURE — 93010 ELECTROCARDIOGRAM REPORT: CPT | Mod: S$PBB,,, | Performed by: INTERNAL MEDICINE

## 2021-06-24 PROCEDURE — 84153 ASSAY OF PSA TOTAL: CPT | Performed by: INTERNAL MEDICINE

## 2021-06-24 PROCEDURE — 99397 PR PREVENTIVE VISIT,EST,65 & OVER: ICD-10-PCS | Mod: S$PBB,,, | Performed by: INTERNAL MEDICINE

## 2021-06-24 PROCEDURE — 36415 COLL VENOUS BLD VENIPUNCTURE: CPT | Performed by: INTERNAL MEDICINE

## 2021-06-24 RX ORDER — BUPROPION HYDROCHLORIDE 150 MG/1
150 TABLET ORAL EVERY MORNING
COMMUNITY
Start: 2021-05-17

## 2021-06-25 RX ORDER — GABAPENTIN 100 MG/1
100 CAPSULE ORAL 3 TIMES DAILY
Qty: 60 CAPSULE | Refills: 11 | Status: SHIPPED | OUTPATIENT
Start: 2021-06-25 | End: 2022-02-22

## 2021-07-08 ENCOUNTER — HOSPITAL ENCOUNTER (OUTPATIENT)
Dept: RADIOLOGY | Facility: HOSPITAL | Age: 71
Discharge: HOME | End: 2021-07-08
Attending: NURSE PRACTITIONER
Payer: MEDICARE

## 2021-07-08 ENCOUNTER — OFFICE VISIT (OUTPATIENT)
Dept: ORTHOPEDICS | Facility: CLINIC | Age: 71
End: 2021-07-08
Payer: MEDICARE

## 2021-07-08 VITALS
BODY MASS INDEX: 39.9 KG/M2 | HEIGHT: 70 IN | DIASTOLIC BLOOD PRESSURE: 92 MMHG | HEART RATE: 73 BPM | SYSTOLIC BLOOD PRESSURE: 159 MMHG | WEIGHT: 278.69 LBS

## 2021-07-08 DIAGNOSIS — M25.572 ACUTE LEFT ANKLE PAIN: ICD-10-CM

## 2021-07-08 DIAGNOSIS — M79.672 LEFT FOOT PAIN: ICD-10-CM

## 2021-07-08 DIAGNOSIS — M76.62 ACHILLES TENDINITIS OF LEFT LOWER EXTREMITY: Primary | ICD-10-CM

## 2021-07-08 DIAGNOSIS — M79.672 LEFT FOOT PAIN: Primary | ICD-10-CM

## 2021-07-08 PROCEDURE — 99214 PR OFFICE/OUTPT VISIT, EST, LEVL IV, 30-39 MIN: ICD-10-PCS | Mod: S$PBB,,, | Performed by: NURSE PRACTITIONER

## 2021-07-08 PROCEDURE — 73610 X-RAY EXAM OF ANKLE: CPT | Mod: TC,LT

## 2021-07-08 PROCEDURE — 73610 X-RAY EXAM OF ANKLE: CPT | Mod: 26,LT,, | Performed by: RADIOLOGY

## 2021-07-08 PROCEDURE — 99214 OFFICE O/P EST MOD 30 MIN: CPT | Mod: S$PBB,,, | Performed by: NURSE PRACTITIONER

## 2021-07-08 PROCEDURE — 73630 XR FOOT COMPLETE 3 VIEW LEFT: ICD-10-PCS | Mod: 26,LT,, | Performed by: RADIOLOGY

## 2021-07-08 PROCEDURE — 73630 X-RAY EXAM OF FOOT: CPT | Mod: 26,LT,, | Performed by: RADIOLOGY

## 2021-07-08 PROCEDURE — 73610 XR ANKLE COMPLETE 3 VIEW LEFT: ICD-10-PCS | Mod: 26,LT,, | Performed by: RADIOLOGY

## 2021-07-08 PROCEDURE — 73630 X-RAY EXAM OF FOOT: CPT | Mod: TC,LT

## 2021-07-08 PROCEDURE — 99999 PR PBB SHADOW E&M-EST. PATIENT-LVL III: ICD-10-PCS | Mod: PBBFAC,,, | Performed by: NURSE PRACTITIONER

## 2021-07-08 PROCEDURE — 99999 PR PBB SHADOW E&M-EST. PATIENT-LVL III: CPT | Mod: PBBFAC,,, | Performed by: NURSE PRACTITIONER

## 2021-07-08 PROCEDURE — 99213 OFFICE O/P EST LOW 20 MIN: CPT | Mod: PBBFAC | Performed by: NURSE PRACTITIONER

## 2021-07-08 RX ORDER — NAPROXEN 500 MG/1
500 TABLET ORAL 2 TIMES DAILY WITH MEALS
Qty: 20 TABLET | Refills: 0 | Status: SHIPPED | OUTPATIENT
Start: 2021-07-08 | End: 2021-12-11 | Stop reason: ALTCHOICE

## 2021-07-13 DIAGNOSIS — I10 ESSENTIAL HYPERTENSION: ICD-10-CM

## 2021-07-13 RX ORDER — LOSARTAN POTASSIUM AND HYDROCHLOROTHIAZIDE 12.5; 5 MG/1; MG/1
1 TABLET ORAL DAILY
Qty: 90 TABLET | Refills: 3 | Status: SHIPPED | OUTPATIENT
Start: 2021-07-13 | End: 2022-08-08

## 2021-07-19 ENCOUNTER — PATIENT MESSAGE (OUTPATIENT)
Dept: PULMONOLOGY | Facility: CLINIC | Age: 71
End: 2021-07-19

## 2021-07-19 ENCOUNTER — PATIENT MESSAGE (OUTPATIENT)
Dept: ORTHOPEDICS | Facility: CLINIC | Age: 71
End: 2021-07-19

## 2021-07-21 DIAGNOSIS — M10.9 GOUT, UNSPECIFIED CAUSE, UNSPECIFIED CHRONICITY, UNSPECIFIED SITE: Primary | ICD-10-CM

## 2021-07-21 RX ORDER — COLCHICINE 0.6 MG/1
0.6 TABLET ORAL DAILY
Qty: 30 TABLET | Refills: 11 | Status: SHIPPED | OUTPATIENT
Start: 2021-07-21 | End: 2022-02-22

## 2021-07-30 ENCOUNTER — PATIENT MESSAGE (OUTPATIENT)
Dept: PULMONOLOGY | Facility: CLINIC | Age: 71
End: 2021-07-30

## 2021-08-05 ENCOUNTER — CLINICAL SUPPORT (OUTPATIENT)
Dept: INTERNAL MEDICINE | Facility: CLINIC | Age: 71
End: 2021-08-05
Payer: MEDICARE

## 2021-08-05 ENCOUNTER — OFFICE VISIT (OUTPATIENT)
Dept: PULMONOLOGY | Facility: CLINIC | Age: 71
End: 2021-08-05
Payer: COMMERCIAL

## 2021-08-05 DIAGNOSIS — S86.002A ACHILLES TENDON INJURY, LEFT, INITIAL ENCOUNTER: Primary | ICD-10-CM

## 2021-08-05 DIAGNOSIS — M10.9 ACUTE GOUT OF LEFT FOOT, UNSPECIFIED CAUSE: Primary | ICD-10-CM

## 2021-08-05 DIAGNOSIS — M10.9 GOUT, UNSPECIFIED CAUSE, UNSPECIFIED CHRONICITY, UNSPECIFIED SITE: Primary | ICD-10-CM

## 2021-08-05 LAB
ERYTHROCYTE [DISTWIDTH] IN BLOOD BY AUTOMATED COUNT: 12.6 % (ref 11.5–14.5)
ERYTHROCYTE [SEDIMENTATION RATE] IN BLOOD BY WESTERGREN METHOD: 19 MM/HR (ref 0–23)
HCT VFR BLD AUTO: 45.3 % (ref 40–54)
HGB BLD-MCNC: 14.9 G/DL (ref 14–18)
MCH RBC QN AUTO: 31.1 PG (ref 27–31)
MCHC RBC AUTO-ENTMCNC: 32.9 G/DL (ref 32–36)
MCV RBC AUTO: 95 FL (ref 82–98)
PLATELET # BLD AUTO: 286 K/UL (ref 150–450)
PMV BLD AUTO: 9.6 FL (ref 9.2–12.9)
RBC # BLD AUTO: 4.79 M/UL (ref 4.6–6.2)
URATE SERPL-MCNC: 8.7 MG/DL (ref 3.4–7)
WBC # BLD AUTO: 7.99 K/UL (ref 3.9–12.7)

## 2021-08-05 PROCEDURE — 99214 PR OFFICE/OUTPT VISIT, EST, LEVL IV, 30-39 MIN: ICD-10-PCS | Mod: S$PBB,,, | Performed by: INTERNAL MEDICINE

## 2021-08-05 PROCEDURE — 85652 RBC SED RATE AUTOMATED: CPT | Performed by: INTERNAL MEDICINE

## 2021-08-05 PROCEDURE — 99214 OFFICE O/P EST MOD 30 MIN: CPT | Mod: S$PBB,,, | Performed by: INTERNAL MEDICINE

## 2021-08-05 PROCEDURE — 84550 ASSAY OF BLOOD/URIC ACID: CPT | Performed by: INTERNAL MEDICINE

## 2021-08-05 PROCEDURE — 36415 COLL VENOUS BLD VENIPUNCTURE: CPT | Performed by: INTERNAL MEDICINE

## 2021-08-05 PROCEDURE — 85027 COMPLETE CBC AUTOMATED: CPT | Performed by: INTERNAL MEDICINE

## 2021-08-06 ENCOUNTER — OFFICE VISIT (OUTPATIENT)
Dept: ORTHOPEDICS | Facility: CLINIC | Age: 71
End: 2021-08-06
Payer: MEDICARE

## 2021-08-06 VITALS — HEIGHT: 70 IN | WEIGHT: 269.81 LBS | BODY MASS INDEX: 38.63 KG/M2

## 2021-08-06 DIAGNOSIS — M67.88 ACHILLES TENDINOSIS OF LEFT LOWER EXTREMITY: Primary | ICD-10-CM

## 2021-08-06 DIAGNOSIS — S86.002A ACHILLES TENDON INJURY, LEFT, INITIAL ENCOUNTER: ICD-10-CM

## 2021-08-06 PROCEDURE — 99999 PR PBB SHADOW E&M-EST. PATIENT-LVL III: CPT | Mod: PBBFAC,,, | Performed by: ORTHOPAEDIC SURGERY

## 2021-08-06 PROCEDURE — 99999 PR PBB SHADOW E&M-EST. PATIENT-LVL III: ICD-10-PCS | Mod: PBBFAC,,, | Performed by: ORTHOPAEDIC SURGERY

## 2021-08-06 PROCEDURE — 99213 PR OFFICE/OUTPT VISIT, EST, LEVL III, 20-29 MIN: ICD-10-PCS | Mod: S$PBB,,, | Performed by: ORTHOPAEDIC SURGERY

## 2021-08-06 PROCEDURE — 99213 OFFICE O/P EST LOW 20 MIN: CPT | Mod: PBBFAC | Performed by: ORTHOPAEDIC SURGERY

## 2021-08-06 PROCEDURE — 99213 OFFICE O/P EST LOW 20 MIN: CPT | Mod: S$PBB,,, | Performed by: ORTHOPAEDIC SURGERY

## 2021-08-10 ENCOUNTER — PATIENT MESSAGE (OUTPATIENT)
Dept: PULMONOLOGY | Facility: CLINIC | Age: 71
End: 2021-08-10

## 2021-08-11 ENCOUNTER — PATIENT MESSAGE (OUTPATIENT)
Dept: ORTHOPEDICS | Facility: CLINIC | Age: 71
End: 2021-08-11

## 2021-08-11 ENCOUNTER — TELEPHONE (OUTPATIENT)
Dept: ORTHOPEDICS | Facility: CLINIC | Age: 71
End: 2021-08-11

## 2021-08-11 DIAGNOSIS — M25.572 PAIN OF JOINT OF LEFT ANKLE AND FOOT: ICD-10-CM

## 2021-08-11 DIAGNOSIS — M67.88 ACHILLES TENDINOSIS OF LEFT LOWER EXTREMITY: Primary | ICD-10-CM

## 2021-08-12 ENCOUNTER — TELEPHONE (OUTPATIENT)
Dept: ORTHOPEDICS | Facility: CLINIC | Age: 71
End: 2021-08-12

## 2021-08-16 ENCOUNTER — HOSPITAL ENCOUNTER (OUTPATIENT)
Dept: RADIOLOGY | Facility: OTHER | Age: 71
Discharge: HOME | End: 2021-08-16
Attending: ORTHOPAEDIC SURGERY
Payer: MEDICARE

## 2021-08-16 DIAGNOSIS — M25.572 PAIN OF JOINT OF LEFT ANKLE AND FOOT: ICD-10-CM

## 2021-08-16 PROCEDURE — 73721 MRI ANKLE WITHOUT CONTRAST LEFT: ICD-10-PCS | Mod: 26,LT,, | Performed by: RADIOLOGY

## 2021-08-16 PROCEDURE — 73721 MRI JNT OF LWR EXTRE W/O DYE: CPT | Mod: 26,LT,, | Performed by: RADIOLOGY

## 2021-08-16 PROCEDURE — 73721 MRI JNT OF LWR EXTRE W/O DYE: CPT | Mod: TC,LT

## 2021-08-21 ENCOUNTER — PATIENT MESSAGE (OUTPATIENT)
Dept: PULMONOLOGY | Facility: CLINIC | Age: 71
End: 2021-08-21

## 2021-08-23 ENCOUNTER — PATIENT MESSAGE (OUTPATIENT)
Dept: PULMONOLOGY | Facility: CLINIC | Age: 71
End: 2021-08-23

## 2021-09-08 ENCOUNTER — PATIENT MESSAGE (OUTPATIENT)
Dept: PULMONOLOGY | Facility: CLINIC | Age: 71
End: 2021-09-08

## 2021-09-08 ENCOUNTER — OFFICE VISIT (OUTPATIENT)
Dept: ORTHOPEDICS | Facility: CLINIC | Age: 71
End: 2021-09-08
Payer: MEDICARE

## 2021-09-08 VITALS — BODY MASS INDEX: 38.51 KG/M2 | HEIGHT: 70 IN | WEIGHT: 269 LBS

## 2021-09-08 DIAGNOSIS — M67.88 ACHILLES TENDINOSIS OF LEFT LOWER EXTREMITY: Primary | ICD-10-CM

## 2021-09-08 PROCEDURE — 99999 PR PBB SHADOW E&M-EST. PATIENT-LVL III: CPT | Mod: PBBFAC,,, | Performed by: ORTHOPAEDIC SURGERY

## 2021-09-08 PROCEDURE — 99213 OFFICE O/P EST LOW 20 MIN: CPT | Mod: PBBFAC | Performed by: ORTHOPAEDIC SURGERY

## 2021-09-08 PROCEDURE — 99212 OFFICE O/P EST SF 10 MIN: CPT | Mod: S$PBB,,, | Performed by: ORTHOPAEDIC SURGERY

## 2021-09-08 PROCEDURE — 99212 PR OFFICE/OUTPT VISIT, EST, LEVL II, 10-19 MIN: ICD-10-PCS | Mod: S$PBB,,, | Performed by: ORTHOPAEDIC SURGERY

## 2021-09-08 PROCEDURE — 99999 PR PBB SHADOW E&M-EST. PATIENT-LVL III: ICD-10-PCS | Mod: PBBFAC,,, | Performed by: ORTHOPAEDIC SURGERY

## 2021-09-19 ENCOUNTER — PATIENT MESSAGE (OUTPATIENT)
Dept: PULMONOLOGY | Facility: CLINIC | Age: 71
End: 2021-09-19

## 2021-09-20 ENCOUNTER — TELEPHONE (OUTPATIENT)
Dept: PULMONOLOGY | Facility: CLINIC | Age: 71
End: 2021-09-20

## 2021-09-20 DIAGNOSIS — M10.9 ACUTE GOUT OF LEFT FOOT, UNSPECIFIED CAUSE: Primary | ICD-10-CM

## 2021-09-20 RX ORDER — ALLOPURINOL 300 MG/1
300 TABLET ORAL DAILY
Qty: 90 TABLET | Refills: 3 | Status: SHIPPED | OUTPATIENT
Start: 2021-09-20 | End: 2022-08-08

## 2021-10-12 ENCOUNTER — TELEPHONE (OUTPATIENT)
Dept: PULMONOLOGY | Facility: CLINIC | Age: 71
End: 2021-10-12

## 2021-10-12 ENCOUNTER — CLINICAL SUPPORT (OUTPATIENT)
Dept: INTERNAL MEDICINE | Facility: CLINIC | Age: 71
End: 2021-10-12
Payer: MEDICARE

## 2021-10-12 ENCOUNTER — OFFICE VISIT (OUTPATIENT)
Dept: ORTHOPEDICS | Facility: CLINIC | Age: 71
End: 2021-10-12
Payer: MEDICARE

## 2021-10-12 VITALS — WEIGHT: 268.94 LBS | HEIGHT: 70 IN | BODY MASS INDEX: 38.5 KG/M2

## 2021-10-12 DIAGNOSIS — M67.88 ACHILLES TENDINOSIS OF LEFT LOWER EXTREMITY: Primary | ICD-10-CM

## 2021-10-12 DIAGNOSIS — M10.9 GOUT, UNSPECIFIED CAUSE, UNSPECIFIED CHRONICITY, UNSPECIFIED SITE: Primary | ICD-10-CM

## 2021-10-12 LAB — URATE SERPL-MCNC: 5.7 MG/DL (ref 3.4–7)

## 2021-10-12 PROCEDURE — 99212 OFFICE O/P EST SF 10 MIN: CPT | Mod: S$PBB,,, | Performed by: ORTHOPAEDIC SURGERY

## 2021-10-12 PROCEDURE — 99212 PR OFFICE/OUTPT VISIT, EST, LEVL II, 10-19 MIN: ICD-10-PCS | Mod: S$PBB,,, | Performed by: ORTHOPAEDIC SURGERY

## 2021-10-12 PROCEDURE — 99214 OFFICE O/P EST MOD 30 MIN: CPT | Mod: PBBFAC | Performed by: ORTHOPAEDIC SURGERY

## 2021-10-12 PROCEDURE — 99999 PR PBB SHADOW E&M-EST. PATIENT-LVL IV: CPT | Mod: PBBFAC,,, | Performed by: ORTHOPAEDIC SURGERY

## 2021-10-12 PROCEDURE — 99999 PR PBB SHADOW E&M-EST. PATIENT-LVL IV: ICD-10-PCS | Mod: PBBFAC,,, | Performed by: ORTHOPAEDIC SURGERY

## 2021-10-12 PROCEDURE — 84550 ASSAY OF BLOOD/URIC ACID: CPT | Performed by: INTERNAL MEDICINE

## 2021-10-26 ENCOUNTER — OFFICE VISIT (OUTPATIENT)
Dept: SPORTS MEDICINE | Facility: CLINIC | Age: 71
End: 2021-10-26
Payer: MEDICARE

## 2021-10-26 VITALS
DIASTOLIC BLOOD PRESSURE: 98 MMHG | WEIGHT: 268 LBS | HEART RATE: 83 BPM | SYSTOLIC BLOOD PRESSURE: 150 MMHG | BODY MASS INDEX: 38.37 KG/M2 | HEIGHT: 70 IN

## 2021-10-26 DIAGNOSIS — M67.88 ACHILLES TENDINOSIS OF LEFT LOWER EXTREMITY: Primary | ICD-10-CM

## 2021-10-26 PROCEDURE — 99999 PR PBB SHADOW E&M-EST. PATIENT-LVL V: CPT | Mod: PBBFAC,,, | Performed by: ORTHOPAEDIC SURGERY

## 2021-10-26 PROCEDURE — 99204 OFFICE O/P NEW MOD 45 MIN: CPT | Mod: S$PBB,25,, | Performed by: ORTHOPAEDIC SURGERY

## 2021-10-26 PROCEDURE — 97110 PR THERAPEUTIC EXERCISES: ICD-10-PCS | Mod: GP,,, | Performed by: ORTHOPAEDIC SURGERY

## 2021-10-26 PROCEDURE — 97110 THERAPEUTIC EXERCISES: CPT | Mod: GP,,, | Performed by: ORTHOPAEDIC SURGERY

## 2021-10-26 PROCEDURE — 99215 OFFICE O/P EST HI 40 MIN: CPT | Mod: PBBFAC | Performed by: ORTHOPAEDIC SURGERY

## 2021-10-26 PROCEDURE — 99204 PR OFFICE/OUTPT VISIT, NEW, LEVL IV, 45-59 MIN: ICD-10-PCS | Mod: S$PBB,25,, | Performed by: ORTHOPAEDIC SURGERY

## 2021-10-26 PROCEDURE — 99999 PR PBB SHADOW E&M-EST. PATIENT-LVL V: ICD-10-PCS | Mod: PBBFAC,,, | Performed by: ORTHOPAEDIC SURGERY

## 2021-10-26 RX ORDER — MELOXICAM 15 MG/1
15 TABLET ORAL DAILY
Qty: 30 TABLET | Refills: 0 | Status: SHIPPED | OUTPATIENT
Start: 2021-10-26 | End: 2021-12-14 | Stop reason: SDUPTHER

## 2021-12-11 ENCOUNTER — HOSPITAL ENCOUNTER (EMERGENCY)
Facility: HOSPITAL | Age: 71
Discharge: HOME | End: 2021-12-11
Attending: INTERNAL MEDICINE
Payer: MEDICARE

## 2021-12-11 VITALS
SYSTOLIC BLOOD PRESSURE: 145 MMHG | WEIGHT: 265 LBS | RESPIRATION RATE: 20 BRPM | BODY MASS INDEX: 39.25 KG/M2 | DIASTOLIC BLOOD PRESSURE: 85 MMHG | HEART RATE: 78 BPM | TEMPERATURE: 99 F | HEIGHT: 69 IN | OXYGEN SATURATION: 96 %

## 2021-12-11 DIAGNOSIS — M25.562 ACUTE PAIN OF LEFT KNEE: Primary | ICD-10-CM

## 2021-12-11 PROCEDURE — 63600175 PHARM REV CODE 636 W HCPCS: Performed by: NURSE PRACTITIONER

## 2021-12-11 PROCEDURE — 99284 EMERGENCY DEPT VISIT MOD MDM: CPT | Mod: 25

## 2021-12-11 PROCEDURE — 73560 XR KNEE 1 OR 2 VIEW LEFT: ICD-10-PCS | Mod: 26,LT,, | Performed by: RADIOLOGY

## 2021-12-11 PROCEDURE — 73560 X-RAY EXAM OF KNEE 1 OR 2: CPT | Mod: 26,LT,, | Performed by: RADIOLOGY

## 2021-12-11 PROCEDURE — 96372 THER/PROPH/DIAG INJ SC/IM: CPT

## 2021-12-11 PROCEDURE — 73560 X-RAY EXAM OF KNEE 1 OR 2: CPT | Mod: TC,FY,LT

## 2021-12-11 RX ORDER — KETOROLAC TROMETHAMINE 30 MG/ML
60 INJECTION, SOLUTION INTRAMUSCULAR; INTRAVENOUS
Status: COMPLETED | OUTPATIENT
Start: 2021-12-11 | End: 2021-12-11

## 2021-12-11 RX ORDER — TRAMADOL HYDROCHLORIDE 50 MG/1
50 TABLET ORAL EVERY 8 HOURS PRN
Qty: 6 TABLET | Refills: 0 | Status: SHIPPED | OUTPATIENT
Start: 2021-12-11 | End: 2022-02-22

## 2021-12-11 RX ORDER — METHYLPREDNISOLONE 4 MG/1
TABLET ORAL
Qty: 1 EACH | Refills: 0 | Status: SHIPPED | OUTPATIENT
Start: 2021-12-11 | End: 2022-01-01

## 2021-12-11 RX ADMIN — KETOROLAC TROMETHAMINE 60 MG: 30 INJECTION, SOLUTION INTRAMUSCULAR; INTRAVENOUS at 02:12

## 2021-12-14 ENCOUNTER — PATIENT MESSAGE (OUTPATIENT)
Dept: PULMONOLOGY | Facility: CLINIC | Age: 71
End: 2021-12-14
Payer: MEDICARE

## 2021-12-14 ENCOUNTER — OFFICE VISIT (OUTPATIENT)
Dept: SPORTS MEDICINE | Facility: CLINIC | Age: 71
End: 2021-12-14
Payer: MEDICARE

## 2021-12-14 VITALS
WEIGHT: 267 LBS | SYSTOLIC BLOOD PRESSURE: 148 MMHG | HEART RATE: 58 BPM | HEIGHT: 69 IN | BODY MASS INDEX: 39.55 KG/M2 | DIASTOLIC BLOOD PRESSURE: 87 MMHG

## 2021-12-14 DIAGNOSIS — M67.88 ACHILLES TENDINOSIS OF LEFT LOWER EXTREMITY: Primary | ICD-10-CM

## 2021-12-14 PROCEDURE — 99999 PR PBB SHADOW E&M-EST. PATIENT-LVL IV: CPT | Mod: PBBFAC,,, | Performed by: ORTHOPAEDIC SURGERY

## 2021-12-14 PROCEDURE — 99214 OFFICE O/P EST MOD 30 MIN: CPT | Mod: PBBFAC | Performed by: ORTHOPAEDIC SURGERY

## 2021-12-14 PROCEDURE — 99999 PR PBB SHADOW E&M-EST. PATIENT-LVL IV: ICD-10-PCS | Mod: PBBFAC,,, | Performed by: ORTHOPAEDIC SURGERY

## 2021-12-14 PROCEDURE — 99214 OFFICE O/P EST MOD 30 MIN: CPT | Mod: S$PBB,,, | Performed by: ORTHOPAEDIC SURGERY

## 2021-12-14 PROCEDURE — 99214 PR OFFICE/OUTPT VISIT, EST, LEVL IV, 30-39 MIN: ICD-10-PCS | Mod: S$PBB,,, | Performed by: ORTHOPAEDIC SURGERY

## 2021-12-14 RX ORDER — MELOXICAM 15 MG/1
15 TABLET ORAL DAILY
Qty: 30 TABLET | Refills: 1 | Status: SHIPPED | OUTPATIENT
Start: 2021-12-14 | End: 2022-02-22

## 2021-12-20 ENCOUNTER — PATIENT MESSAGE (OUTPATIENT)
Dept: PODIATRY | Facility: CLINIC | Age: 71
End: 2021-12-20
Payer: MEDICARE

## 2022-01-08 ENCOUNTER — PATIENT MESSAGE (OUTPATIENT)
Dept: PULMONOLOGY | Facility: CLINIC | Age: 72
End: 2022-01-08
Payer: MEDICARE

## 2022-01-12 ENCOUNTER — OFFICE VISIT (OUTPATIENT)
Dept: PODIATRY | Facility: CLINIC | Age: 72
End: 2022-01-12
Payer: MEDICARE

## 2022-01-12 VITALS
BODY MASS INDEX: 39.55 KG/M2 | SYSTOLIC BLOOD PRESSURE: 164 MMHG | HEART RATE: 93 BPM | HEIGHT: 69 IN | DIASTOLIC BLOOD PRESSURE: 78 MMHG | WEIGHT: 267 LBS

## 2022-01-12 DIAGNOSIS — B35.3 TINEA PEDIS OF BOTH FEET: Primary | ICD-10-CM

## 2022-01-12 PROCEDURE — 99213 PR OFFICE/OUTPT VISIT, EST, LEVL III, 20-29 MIN: ICD-10-PCS | Mod: S$PBB,,, | Performed by: PODIATRIST

## 2022-01-12 PROCEDURE — 99213 OFFICE O/P EST LOW 20 MIN: CPT | Mod: PBBFAC,PN | Performed by: PODIATRIST

## 2022-01-12 PROCEDURE — 99213 OFFICE O/P EST LOW 20 MIN: CPT | Mod: S$PBB,,, | Performed by: PODIATRIST

## 2022-01-12 PROCEDURE — 99999 PR PBB SHADOW E&M-EST. PATIENT-LVL III: CPT | Mod: PBBFAC,,, | Performed by: PODIATRIST

## 2022-01-12 PROCEDURE — 99999 PR PBB SHADOW E&M-EST. PATIENT-LVL III: ICD-10-PCS | Mod: PBBFAC,,, | Performed by: PODIATRIST

## 2022-01-12 RX ORDER — CICLOPIROX OLAMINE 7.7 MG/G
CREAM TOPICAL 2 TIMES DAILY
Qty: 90 G | Refills: 2 | Status: SHIPPED | OUTPATIENT
Start: 2022-01-12

## 2022-01-12 NOTE — PROGRESS NOTES
Subjective:      Patient ID: Adolfo Vazquez is a 71 y.o. male.    Chief Complaint: Follow-up    Thick dry itching skin over red base worrisome and sometimes painful to the patient.  This happens of both feet.  Gradual onset, worsening over the past several weeks.  Aggravated by increased weight-bearing socks and shoes.  Prior medical treatment with topical antifungal was worked well in the past.  He wishes to repeat that.  No self-treatment currently.  Denies trauma and surgery both feet    Current toes he ages gout versus partial tear Achilles tendon left be managed by Orthopedics to the patient's satisfaction.    Review of Systems   Constitutional: Negative for chills, diaphoresis, fever, malaise/fatigue and night sweats.   Cardiovascular: Negative for claudication, cyanosis, leg swelling and syncope.   Skin: Negative for color change, dry skin, nail changes, rash, suspicious lesions and unusual hair distribution.   Musculoskeletal: Negative for falls, joint pain, joint swelling, muscle cramps, muscle weakness and stiffness.   Gastrointestinal: Negative for constipation, diarrhea, nausea and vomiting.   Neurological: Negative for brief paralysis, disturbances in coordination, focal weakness, numbness, paresthesias, sensory change and tremors.           Objective:      Physical Exam  Constitutional:       General: He is not in acute distress.     Appearance: He is well-developed and well-nourished. He is not diaphoretic.   Cardiovascular:      Pulses:           Popliteal pulses are 2+ on the right side and 2+ on the left side.        Dorsalis pedis pulses are 2+ on the right side and 2+ on the left side.        Posterior tibial pulses are 2+ on the right side and 2+ on the left side.      Comments: Capillary refill 3 seconds all toes/distal feet, all toes/both feet warm to touch.      Negative lymphadenopathy bilateral popliteal fossa and tarsal tunnel.      Negavie lower extremity edema  bilateral.    Musculoskeletal:      Right ankle: No swelling, deformity, ecchymosis or lacerations. Normal range of motion. Normal pulse.      Right Achilles Tendon: Normal. No pain or defects. Dang's test negative.   Lymphadenopathy:      Lower Body: No right inguinal adenopathy. No left inguinal adenopathy.      Comments: Negative lymphadenopathy bilateral popliteal fossa and tarsal tunnel.    Negative lymphangitic streaking bilateral feet/ankles/legs.   Skin:     General: Skin is warm, dry and intact.      Capillary Refill: Capillary refill takes 2 to 3 seconds.      Coloration: Skin is not pale.      Findings: No abrasion, bruising, burn, ecchymosis, erythema, laceration, lesion or rash.      Nails: There is no clubbing or cyanosis.      Comments:   Dry scale with superficial flakes over an erythematous base of the bottoms of both feet without ulceration, drainage, pus, tracking, fluctuance, malodor, or cardinal signs infection.    Oherwise, Skin is normal age and health appropriate color, turgor, texture, and temperature bilateral lower extremities without ulceration, hyperpigmentation, discoloration, masses nodules or cords palpated.  No ecchymosis, erythema, edema, or cardinal signs of infection bilateral lower extremities.    Neurological:      Mental Status: He is alert and oriented to person, place, and time.      Sensory: No sensory deficit.      Motor: No tremor, atrophy or abnormal muscle tone.      Gait: Gait normal.      Deep Tendon Reflexes: Strength normal.      Comments: Negative allodynia both feet   Psychiatric:         Mood and Affect: Mood and affect normal.         Behavior: Behavior is cooperative.               Assessment:       Encounter Diagnosis   Name Primary?    Tinea pedis of both feet Yes         Plan:       Adolfo was seen today for follow-up.    Diagnoses and all orders for this visit:    Tinea pedis of both feet    Other orders  -     ciclopirox (LOPROX) 0.77 % Crea; Apply  topically 2 (two) times daily.      I counseled the patient on his conditions, their implications and medical management.        Topical Loprox cream 2-3 times daily.    Discussed conservative treatment with shoes of adequate dimensions, material, and style to alleviate symptoms and delay or prevent surgical intervention.    Continue home maintenance with pumice stone or similar.    P.r.n.          No follow-ups on file.

## 2022-01-14 ENCOUNTER — TELEPHONE (OUTPATIENT)
Dept: SPORTS MEDICINE | Facility: CLINIC | Age: 72
End: 2022-01-14
Payer: MEDICARE

## 2022-01-14 NOTE — TELEPHONE ENCOUNTER
----- Message from Megan Castro sent at 1/14/2022 11:11 AM CST -----  Regarding: Signed clinical  Contact: Lima abrams/[facility] [phone]  Calling in regards to request for signed clinicals initially sent on 12/27 and 1/5/2022 for dos 12/21/2021. Please send back signed chart notes to fax [phone]

## 2022-01-25 ENCOUNTER — PATIENT MESSAGE (OUTPATIENT)
Dept: PULMONOLOGY | Facility: CLINIC | Age: 72
End: 2022-01-25
Payer: MEDICARE

## 2022-02-14 ENCOUNTER — PATIENT MESSAGE (OUTPATIENT)
Dept: PULMONOLOGY | Facility: CLINIC | Age: 72
End: 2022-02-14
Payer: MEDICARE

## 2022-02-15 ENCOUNTER — TELEPHONE (OUTPATIENT)
Dept: PULMONOLOGY | Facility: CLINIC | Age: 72
End: 2022-02-15
Payer: MEDICARE

## 2022-02-15 NOTE — TELEPHONE ENCOUNTER
Dr Paul spoke to Mr Vazquez telling him to get a CD copy of his scan and wait to hear back as Dr Paul left a message on Dr Monteiro's desk in Neurosurgery about his need for a appointment. Mr Vazquez agreed. Sudarshan KHAN

## 2022-02-16 ENCOUNTER — TELEPHONE (OUTPATIENT)
Dept: NEUROSURGERY | Facility: CLINIC | Age: 72
End: 2022-02-16
Payer: MEDICARE

## 2022-02-16 NOTE — TELEPHONE ENCOUNTER
As Dr Paul's request, called pt re: newly dx'd acoustic neuroma. Has MRI on disc.    Appt offered and accepted for next Tuesday at 9am.

## 2022-02-18 ENCOUNTER — TELEPHONE (OUTPATIENT)
Dept: PULMONOLOGY | Facility: CLINIC | Age: 72
End: 2022-02-18
Payer: MEDICARE

## 2022-02-22 ENCOUNTER — OFFICE VISIT (OUTPATIENT)
Dept: NEUROSURGERY | Facility: CLINIC | Age: 72
End: 2022-02-22
Payer: MEDICARE

## 2022-02-22 ENCOUNTER — OFFICE VISIT (OUTPATIENT)
Dept: PULMONOLOGY | Facility: CLINIC | Age: 72
End: 2022-02-22
Payer: MEDICARE

## 2022-02-22 VITALS
HEIGHT: 69 IN | SYSTOLIC BLOOD PRESSURE: 107 MMHG | BODY MASS INDEX: 39.25 KG/M2 | OXYGEN SATURATION: 99 % | HEART RATE: 81 BPM | WEIGHT: 265 LBS | TEMPERATURE: 98 F | DIASTOLIC BLOOD PRESSURE: 74 MMHG

## 2022-02-22 VITALS — OXYGEN SATURATION: 98 % | WEIGHT: 262.81 LBS | HEIGHT: 69 IN | HEART RATE: 80 BPM | BODY MASS INDEX: 38.93 KG/M2

## 2022-02-22 DIAGNOSIS — D33.3 LEFT ACOUSTIC NEUROMA: Primary | ICD-10-CM

## 2022-02-22 DIAGNOSIS — Z01.818 PRE-OP EXAM: Primary | ICD-10-CM

## 2022-02-22 PROCEDURE — 99999 PR PBB SHADOW E&M-EST. PATIENT-LVL V: CPT | Mod: PBBFAC,,, | Performed by: NEUROLOGICAL SURGERY

## 2022-02-22 PROCEDURE — 99999 PR PBB SHADOW E&M-EST. PATIENT-LVL II: CPT | Mod: PBBFAC,,, | Performed by: INTERNAL MEDICINE

## 2022-02-22 PROCEDURE — 99204 PR OFFICE/OUTPT VISIT, NEW, LEVL IV, 45-59 MIN: ICD-10-PCS | Mod: S$PBB,,, | Performed by: NEUROLOGICAL SURGERY

## 2022-02-22 PROCEDURE — 99204 OFFICE O/P NEW MOD 45 MIN: CPT | Mod: S$PBB,,, | Performed by: NEUROLOGICAL SURGERY

## 2022-02-22 PROCEDURE — 99999 PR PBB SHADOW E&M-EST. PATIENT-LVL II: ICD-10-PCS | Mod: PBBFAC,,, | Performed by: INTERNAL MEDICINE

## 2022-02-22 PROCEDURE — 99215 OFFICE O/P EST HI 40 MIN: CPT | Mod: PBBFAC,27 | Performed by: NEUROLOGICAL SURGERY

## 2022-02-22 PROCEDURE — 99212 OFFICE O/P EST SF 10 MIN: CPT | Mod: PBBFAC | Performed by: INTERNAL MEDICINE

## 2022-02-22 PROCEDURE — 99214 OFFICE O/P EST MOD 30 MIN: CPT | Mod: S$PBB,,, | Performed by: INTERNAL MEDICINE

## 2022-02-22 PROCEDURE — 99214 PR OFFICE/OUTPT VISIT, EST, LEVL IV, 30-39 MIN: ICD-10-PCS | Mod: S$PBB,,, | Performed by: INTERNAL MEDICINE

## 2022-02-22 PROCEDURE — 99999 PR PBB SHADOW E&M-EST. PATIENT-LVL V: ICD-10-PCS | Mod: PBBFAC,,, | Performed by: NEUROLOGICAL SURGERY

## 2022-02-22 RX ORDER — ZOSTER VACCINE RECOMBINANT, ADJUVANTED 50 MCG/0.5
KIT INTRAMUSCULAR
COMMUNITY
Start: 2021-10-18 | End: 2023-08-16

## 2022-02-22 NOTE — PATIENT INSTRUCTIONS
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     I recommend GSRS for left acoustic neuroma. I have discussed the risks/benefits, indications, and alternatives for the proposed procedure in detail. I have answered all of their questions and the pt wishes to proceed with surgery. We will schedule the pt on the next available date.    I will refer the pt to physical therapy.

## 2022-02-22 NOTE — PROGRESS NOTES
Subjective:   I, Tania Medrano, attest that this documentation has been prepared under the direction and in the presence of David Monteiro MD.     Patient ID: Adolfo Vazquez is a 71 y.o. male     Chief Complaint: No chief complaint on file.          HPI  Mr. Adolfo Vazquez is a 71 y.o. gentleman with HTN, who presents today for establishing care. This is a pt who began having hearing loss to his left ear. He also began having ringing to his ear and was seen by his physician who ordered MRI showing concerns for acoustic neuroma. States he is having issues with his balance when he walks. This occurs intermittently and states he always feels that he is going to fall as he walks.     Review of Systems   Constitutional: Negative for activity change, appetite change, fatigue, fever and unexpected weight change.   HENT: Positive for hearing loss (left) and tinnitus (left). Negative for facial swelling.    Eyes: Negative.    Respiratory: Negative.    Cardiovascular: Negative.    Gastrointestinal: Negative for diarrhea, nausea and vomiting.   Endocrine: Negative.    Genitourinary: Negative.    Musculoskeletal: Positive for gait problem (imbalance). Negative for back pain, joint swelling, myalgias and neck pain.   Neurological: Negative for dizziness, seizures, weakness, numbness and headaches.   Psychiatric/Behavioral: Negative.       Past Medical History:   Diagnosis Date    Anxiety     Colon polyp     benign    Depression     Elevated PSA     High cholesterol     Hypertension     Prostate cancer        Objective:      Vitals:    02/22/22 0915   BP: 107/74   Pulse: 81   Temp: 98 °F (36.7 °C)      Physical Exam  Constitutional:       General: He is not in acute distress.     Appearance: Normal appearance.   HENT:      Head: Normocephalic and atraumatic.   Pulmonary:      Effort: Pulmonary effort is normal.   Musculoskeletal:      Cervical back: Neck supple.   Neurological:      Mental Status: He is alert and  oriented to person, place, and time.      GCS: GCS eye subscore is 4. GCS verbal subscore is 5. GCS motor subscore is 6.      Cranial Nerves: No cranial nerve deficit.      Coordination: Romberg sign negative.      Gait: Gait is intact.            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 .       I have personally reviewed the images with the pt.      I, Dr. David Monteiro, personally performed the services described in this documentation. All medical record entries made by the scribe, Tania Medrano, were at my direction and in my presence.  I have reviewed the chart and agree that the record reflects my personal performance and is accurate and complete. David Monteiro MD. 02/22/2022    Assessment:       Acoustic neuroma.      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     I recommend GSRS for left acoustic neuroma. I have discussed the risks/benefits, indications, and alternatives for the proposed procedure in detail. I have answered all of their questions and the pt wishes to proceed with surgery. We will schedule the pt on the next available date.    I will refer the pt to physical therapy.

## 2022-02-22 NOTE — PROGRESS NOTES
Subjective:      Patient ID: Adolfo Vazquez is a 71 y.o. male.    Chief Complaint: Pre-op Exam    HPI  72 yo retired  recently diagnose with an acoustical neuroma.  He has had some trouble with balance and recently had tinnitus and loss of hearing. He was his ENT specialist who worked him up and found an acoustical neuroma. He is coming to see Dr. Monteiro later this AM for further evaluation and possible surgery.      No flowsheet data found.  Review of Systems   Constitutional: Negative.    HENT: Positive for hearing loss.         Recently diagnosed with an acoustical neuroma   Eyes: Negative.    Respiratory: Negative.    Cardiovascular: Negative.    Genitourinary: Negative.         S/P Prostate Cancer   Musculoskeletal: Negative.    Skin: Negative.    Gastrointestinal: Negative.    Neurological: Negative.    Psychiatric/Behavioral: Negative.      Objective:     Physical Exam  Constitutional:       Appearance: He is well-developed. He is obese.   HENT:      Head: Normocephalic and atraumatic.      Right Ear: External ear normal.      Left Ear: External ear normal.   Eyes:      Conjunctiva/sclera: Conjunctivae normal.      Pupils: Pupils are equal, round, and reactive to light.   Cardiovascular:      Rate and Rhythm: Normal rate and regular rhythm.      Heart sounds: Normal heart sounds.   Pulmonary:      Effort: Pulmonary effort is normal.      Breath sounds: Normal breath sounds.   Abdominal:      General: Bowel sounds are normal.      Palpations: Abdomen is soft.   Musculoskeletal:         General: Normal range of motion.      Cervical back: Normal range of motion and neck supple.   Skin:     General: Skin is warm and dry.   Neurological:      Mental Status: He is alert and oriented to person, place, and time.      Deep Tendon Reflexes: Reflexes are normal and symmetric.   Psychiatric:         Behavior: Behavior normal.         Thought Content: Thought content normal.         Judgment: Judgment normal.          Assessment:     1. Pre-op exam      Outpatient Encounter Medications as of 2/22/2022   Medication Sig Dispense Refill    ABILIFY 2 mg Tab 2 mg once daily.       allopurinoL (ZYLOPRIM) 300 MG tablet Take 1 tablet (300 mg total) by mouth once daily. 90 tablet 3    alprazolam (XANAX) 0.5 MG tablet 0.5 mg 2 (two) times daily as needed.       azelastine (ASTELIN) 137 mcg (0.1 %) nasal spray 1 spray once daily.      buPROPion (WELLBUTRIN XL) 150 MG TB24 tablet Take 150 mg by mouth every morning.      buPROPion (WELLBUTRIN XL) 300 MG 24 hr tablet       ciclopirox (LOPROX) 0.77 % Crea Apply topically 2 (two) times daily. 90 g 2    CYMBALTA 60 mg capsule Take 60 mg by mouth once daily.       FLUZONE HIGH-DOSE 2016-17, PF, 180 mcg/0.5 mL Syrg 1 Dose once.  0    levothyroxine (SYNTHROID) 100 MCG tablet Take 1 tablet (100 mcg total) by mouth every morning. 90 tablet 3    losartan-hydrochlorothiazide 50-12.5 mg (HYZAAR) 50-12.5 mg per tablet Take 1 tablet by mouth once daily. 90 tablet 3    SHINGRIX, PF, 50 mcg/0.5 mL injection       trazodone (DESYREL) 100 MG tablet 100 mg nightly as needed.       [DISCONTINUED] colchicine (COLCRYS) 0.6 mg tablet Take 1 tablet (0.6 mg total) by mouth once daily. 30 tablet 11    [DISCONTINUED] gabapentin (NEURONTIN) 100 MG capsule Take 1 capsule (100 mg total) by mouth 3 (three) times daily. 60 capsule 11    [DISCONTINUED] levocetirizine (XYZAL) 5 MG tablet       [DISCONTINUED] meloxicam (MOBIC) 15 MG tablet Take 1 tablet (15 mg total) by mouth once daily. 30 tablet 1    [DISCONTINUED] montelukast (SINGULAIR) 10 mg tablet 1 tablet nightly.      [DISCONTINUED] traMADoL (ULTRAM) 50 mg tablet Take 1 tablet (50 mg total) by mouth every 8 (eight) hours as needed for Pain. 6 tablet 0     No facility-administered encounter medications on file as of 2/22/2022.       Plan:     Problem List Items Addressed This Visit    None     Visit Diagnoses     Pre-op exam    -  Primary         Saw Dr. Monteiro today and he plans to address the acoustical neuroma with gamma knife surgery in the near future.

## 2022-03-09 ENCOUNTER — OUTSIDE PLACE OF SERVICE (OUTPATIENT)
Dept: NEUROSURGERY | Facility: CLINIC | Age: 72
End: 2022-03-09
Payer: MEDICARE

## 2022-03-09 PROCEDURE — 61800 PR APPLY STEREOTACTIC HEADFRAME FOR RADIOSURGERY: ICD-10-PCS | Mod: ,,, | Performed by: NEUROLOGICAL SURGERY

## 2022-03-09 PROCEDURE — 61798 PR STEREOTACTIC RADIOSURGERY, CRANIAL,COMPLEX,SINGLE: ICD-10-PCS | Mod: ,,, | Performed by: NEUROLOGICAL SURGERY

## 2022-03-09 PROCEDURE — 61800 APPLY SRS HEADFRAME ADD-ON: CPT | Mod: ,,, | Performed by: NEUROLOGICAL SURGERY

## 2022-03-09 PROCEDURE — 61798 SRS CRANIAL LESION COMPLEX: CPT | Mod: ,,, | Performed by: NEUROLOGICAL SURGERY

## 2022-03-10 ENCOUNTER — PATIENT MESSAGE (OUTPATIENT)
Dept: NEUROSURGERY | Facility: CLINIC | Age: 72
End: 2022-03-10
Payer: MEDICARE

## 2022-03-14 DIAGNOSIS — D33.3 LEFT ACOUSTIC NEUROMA: Primary | ICD-10-CM

## 2022-03-14 RX ORDER — METHYLPREDNISOLONE 4 MG/1
TABLET ORAL
Qty: 21 EACH | Refills: 0 | Status: SHIPPED | OUTPATIENT
Start: 2022-03-14 | End: 2022-04-04

## 2022-03-22 ENCOUNTER — OFFICE VISIT (OUTPATIENT)
Dept: NEUROSURGERY | Facility: CLINIC | Age: 72
End: 2022-03-22
Payer: MEDICARE

## 2022-03-22 VITALS
DIASTOLIC BLOOD PRESSURE: 77 MMHG | HEIGHT: 69 IN | HEART RATE: 87 BPM | WEIGHT: 263.25 LBS | SYSTOLIC BLOOD PRESSURE: 144 MMHG | BODY MASS INDEX: 38.99 KG/M2 | TEMPERATURE: 99 F | OXYGEN SATURATION: 98 %

## 2022-03-22 DIAGNOSIS — D33.3 LEFT ACOUSTIC NEUROMA: Primary | ICD-10-CM

## 2022-03-22 PROCEDURE — 99024 PR POST-OP FOLLOW-UP VISIT: ICD-10-PCS | Mod: POP,,, | Performed by: NEUROLOGICAL SURGERY

## 2022-03-22 PROCEDURE — 99214 OFFICE O/P EST MOD 30 MIN: CPT | Mod: PBBFAC | Performed by: NEUROLOGICAL SURGERY

## 2022-03-22 PROCEDURE — 99999 PR PBB SHADOW E&M-EST. PATIENT-LVL IV: ICD-10-PCS | Mod: PBBFAC,,, | Performed by: NEUROLOGICAL SURGERY

## 2022-03-22 PROCEDURE — 99999 PR PBB SHADOW E&M-EST. PATIENT-LVL IV: CPT | Mod: PBBFAC,,, | Performed by: NEUROLOGICAL SURGERY

## 2022-03-22 PROCEDURE — 99024 POSTOP FOLLOW-UP VISIT: CPT | Mod: POP,,, | Performed by: NEUROLOGICAL SURGERY

## 2022-03-22 RX ORDER — DEXAMETHASONE 2 MG/1
4 TABLET ORAL 2 TIMES DAILY WITH MEALS
Qty: 28 TABLET | Refills: 0 | Status: CANCELLED | OUTPATIENT
Start: 2022-03-22 | End: 2022-03-29

## 2022-03-22 RX ORDER — DEXAMETHASONE 4 MG/1
4 TABLET ORAL EVERY 12 HOURS
Qty: 14 TABLET | Refills: 0 | Status: SHIPPED | OUTPATIENT
Start: 2022-03-22 | End: 2022-03-29

## 2022-03-22 NOTE — PROGRESS NOTES
Subjective:   I, Tania Medrano, attest that this documentation has been prepared under the direction and in the presence of David Monteiro MD.     Patient ID: Adolfo Vazquez is a 71 y.o. male     Chief Complaint: No chief complaint on file.          HPI  Mr. Adolfo Vazquez is a 71 y.o. gentleman with HTN, left acoustic neuroma, who presents today for follow up for GSRS with dose of 14Gy to the 50% isodose line that was done on 3/9/2022.  This is a pt who began having hearing loss to his left ear. He also began having ringing to his ear and was seen by his physician who ordered MRI showing concerns for acoustic neuroma. States he is having issues with his balance when he walks. This occurs intermittently and states he always feels that he is going to fall as he walks. MRI Brain (outside disc, 2022): Acoustic tumor in the internal auditory meatus measuring 9 mm. I recommended GSRS and pt wished to proceed.     Today the pt reports he has been having some intermittent minimal unsteady gait, he ambulates with a walker at times. He also reports of left ear tinnitus and hearing loss. He has not had an audiogram done. States there has been improvement with being on Medrol. Pt denies any pain at this time.    Review of Systems   Constitutional: Negative for activity change, appetite change, fatigue, fever and unexpected weight change.   HENT: Positive for hearing loss and tinnitus. Negative for facial swelling.    Eyes: Negative.    Respiratory: Negative.    Cardiovascular: Negative.    Gastrointestinal: Negative for diarrhea, nausea and vomiting.   Endocrine: Negative.    Genitourinary: Negative.    Musculoskeletal: Positive for gait problem (unsteady). Negative for back pain, joint swelling, myalgias and neck pain.   Neurological: Negative for dizziness, seizures, weakness, numbness and headaches.   Psychiatric/Behavioral: Negative.       Past Medical History:   Diagnosis Date    Anxiety     Colon polyp     benign     Depression     Elevated PSA     High cholesterol     Hypertension     Prostate cancer        Objective:      Vitals:    03/22/22 1132   BP: (!) 144/77   Pulse: 87   Temp: 98.6 °F (37 °C)      Physical Exam  Constitutional:       General: He is not in acute distress.     Appearance: Normal appearance.   HENT:      Head: Normocephalic and atraumatic.   Pulmonary:      Effort: Pulmonary effort is normal.   Musculoskeletal:      Cervical back: Neck supple.   Neurological:      Mental Status: He is alert and oriented to person, place, and time.      GCS: GCS eye subscore is 4. GCS verbal subscore is 5. GCS motor subscore is 6.      Cranial Nerves: No cranial nerve deficit.      Sensory: Sensation is intact.      Motor: Motor function is intact.      Comments: Patient with decreased hearing in the left ear.              IMAGING:  MRI Brain (outside disc, 3/9/2022):  1. There is an approximate 0.4 x 1.07 cm focus of enhancement of the intracanalicular portion of the left acoustic nerve complex consistent with acoustic neuroma.   2. Moderate age-related involutional changes.      I have personally reviewed the images with the pt.      I, Dr. David Monteiro, personally performed the services described in this documentation. All medical record entries made by the scribe, Tania Medrano, were at my direction and in my presence.  I have reviewed the chart and agree that the record reflects my personal performance and is accurate and complete. David Monteiro MD. 03/22/2022    Assessment:       1. Left acoustic neuroma         Plan:   I have personally reviewed the MRI brain with the pt which shows:  1. There is an approximate 0.4 x 1.07 cm focus of enhancement of the intracanalicular portion of the left acoustic nerve complex consistent with acoustic neuroma.   2. Moderate age-related involutional changes.    I will refer to audiology for audiogram. I will schedule the patient for follow up thereafter.

## 2022-03-22 NOTE — TELEPHONE ENCOUNTER
"Called dex Rx to [facility] in [place]    ----- Message from Keyla Castillo sent at 3/22/2022  2:59 PM CDT -----  "Type:  Patient Call Back    Who Called:KERRY KHAN    What is the reqeust in detail:Pt requesting call back. Pt was seen today and prescription was sent to wrong Pharmacy. Please advise    Can the clinic reply by [facility]? no    Best Call Back Number:[phone]      Additional Information: Send to [id number] - [place], [place] - [address]      dexAMETHasone (DECADRON) 4 MG Tab        "

## 2022-03-22 NOTE — PATIENT INSTRUCTIONS
I have personally reviewed the MRI brain with the pt which shows:  1. There is an approximate 0.4 x 1.07 cm focus of enhancement of the intracanalicular portion of the left acoustic nerve complex consistent with acoustic neuroma.   2. Moderate age-related involutional changes.    I will refer to audiology for audiogram. I will schedule the patient for follow up thereafter.

## 2022-03-23 ENCOUNTER — PATIENT MESSAGE (OUTPATIENT)
Dept: NEUROSURGERY | Facility: CLINIC | Age: 72
End: 2022-03-23
Payer: MEDICARE

## 2022-03-28 ENCOUNTER — CLINICAL SUPPORT (OUTPATIENT)
Dept: AUDIOLOGY | Facility: CLINIC | Age: 72
End: 2022-03-28
Payer: MEDICARE

## 2022-03-28 DIAGNOSIS — H81.8X9 OTHER DISORDERS OF VESTIBULAR FUNCTION, UNSPECIFIED EAR: ICD-10-CM

## 2022-03-28 DIAGNOSIS — H93.12 TINNITUS, SUBJECTIVE, LEFT: ICD-10-CM

## 2022-03-28 DIAGNOSIS — H90.A21 SENSORINEURAL HEARING LOSS (SNHL) OF RIGHT EAR WITH RESTRICTED HEARING OF LEFT EAR: Primary | ICD-10-CM

## 2022-03-28 DIAGNOSIS — D33.3 LEFT ACOUSTIC NEUROMA: ICD-10-CM

## 2022-03-28 PROCEDURE — 92567 TYMPANOMETRY: CPT | Mod: PBBFAC

## 2022-03-28 PROCEDURE — 92557 COMPREHENSIVE HEARING TEST: CPT | Mod: PBBFAC

## 2022-03-28 NOTE — PROGRESS NOTES
Adolfo Vazquez was seen today in the clinic for an audiologic evaluation referred by Dr. Monteiro with neurosurgery.  Patient's main complaint was decreased hearing in his left ear. Mr. Vazquez has a history of a left side acoustic neuroma, first identified with an asymmetrical hearing loss in the presence of unilateral left sided tinnitus and vertigo. He reported recently receiving gamma knife radiation treatment. He reported since treatment, perceiving a further decrease in hearing in his left ear with continued unilateral tinnitus and vertigo. Mr. Vazquez denied otalgia and aural fullness. He reported no perceived hearing loss or tinnitus in the right ear.    Tympanometry revealed Type A in the right ear and Type A in the left ear.     Audiogram results revealed a mild sensorineural hearing loss (SNHL) from 3944-9277 Hz only in the right ear and a mild to moderately-severe SNHL in the left ear. There was a significant asymmetry noted between ears from 500-8000 Hz, with the left ear poorer than the right ear.    Speech reception thresholds were noted at 20 dBHL in the right ear and 30 dBHL in the left ear.    Speech discrimination scores were 92% in the right ear and 52% in the left ear. There was a speech discrimination asymmetry noted between ears, with the left ear poorer than the right ear.    Today's results were discussed with the patient and Mr. Vazquez expressed understanding of today's findings. Hearing aids were discussed with Mr. Vazquez. It should be noted that depending on the course of treatment, if speech understanding declines further, hearing aid recommendations may change. I encouraged Mr. Vazquez to follow-up with Dr. Monteiro and once he is stable, consider a hearing aid consultation. Mr. Vazquez expressed understanding and agreed to the plan.    Recommendations:  1. Otologic evaluation  2. Hearing aid consultation  3. Annual audiogram or sooner if change perceived  4. Hearing protection in noise

## 2022-03-28 NOTE — Clinical Note
Hi Dr. Monteiro,  Please see the results of Mr. Vazquez's audiogram from today. If you have any questions, please let me know.   Thank you! Shira Echeverria, [id number]

## 2022-04-26 ENCOUNTER — OFFICE VISIT (OUTPATIENT)
Dept: NEUROSURGERY | Facility: CLINIC | Age: 72
End: 2022-04-26
Payer: MEDICARE

## 2022-04-26 VITALS
OXYGEN SATURATION: 97 % | DIASTOLIC BLOOD PRESSURE: 83 MMHG | WEIGHT: 263.25 LBS | HEART RATE: 102 BPM | SYSTOLIC BLOOD PRESSURE: 124 MMHG | BODY MASS INDEX: 38.99 KG/M2 | HEIGHT: 69 IN | TEMPERATURE: 98 F

## 2022-04-26 DIAGNOSIS — D33.3 LEFT ACOUSTIC NEUROMA: Primary | ICD-10-CM

## 2022-04-26 PROCEDURE — 99215 OFFICE O/P EST HI 40 MIN: CPT | Mod: PBBFAC | Performed by: NEUROLOGICAL SURGERY

## 2022-04-26 PROCEDURE — 99999 PR PBB SHADOW E&M-EST. PATIENT-LVL V: ICD-10-PCS | Mod: PBBFAC,,, | Performed by: NEUROLOGICAL SURGERY

## 2022-04-26 PROCEDURE — 99024 POSTOP FOLLOW-UP VISIT: CPT | Mod: S$PBB,,, | Performed by: NEUROLOGICAL SURGERY

## 2022-04-26 PROCEDURE — 99999 PR PBB SHADOW E&M-EST. PATIENT-LVL V: CPT | Mod: PBBFAC,,, | Performed by: NEUROLOGICAL SURGERY

## 2022-04-26 PROCEDURE — 99024 PR POST-OP FOLLOW-UP VISIT: ICD-10-PCS | Mod: S$PBB,,, | Performed by: NEUROLOGICAL SURGERY

## 2022-04-26 RX ORDER — CLONAZEPAM 1 MG/1
1 TABLET ORAL NIGHTLY PRN
COMMUNITY
Start: 2022-04-13 | End: 2024-04-03

## 2022-04-26 NOTE — PATIENT INSTRUCTIONS
I will refer to physical therapy for balance training.     I will schedule the patient for 3 month follow up with MRI brain.

## 2022-04-26 NOTE — PROGRESS NOTES
Subjective:   I, Tania Medrano, attest that this documentation has been prepared under the direction and in the presence of David Monteiro MD.     Patient ID: Adolfo Vazquez is a 71 y.o. male     Chief Complaint: No chief complaint on file.          HPI  Mr. Adolfo Vazquez is a 71 y.o. gentleman with a history of HTN, left acoustic neuroma, s/p GSRS with dose of 14Gy to the 50% isodose line that was done on 3/9/2022, who presents today for follow up.   This is a pt who began having hearing loss to his left ear. He also began having ringing to his ear and was seen by his physician who ordered MRI showing concerns for acoustic neuroma. States he is having issues with his balance when he walks. This occurs intermittently and states he always feels that he is going to fall as he walks. MRI Brain (outside disc, 2022): acoustic tumor in the internal auditory meatus measuring 9 mm. I recommended GSRS and pt wished to proceed.   At the time of our last visit on 3/22/2022, pt had complaints of left ear tinnitus and hearing loss. He also reports of intermittent minimal unsteady gait.    The pt had audiogram done on 3/28/22. Per note results revealed mild sensorineural hearing loss in the right ear and mild to moderately-severe sensorineural hearing loss in the left ear. Today he reports of now having increasing unsteadiness as he walks. He ambulates with a cane. States he is unable to walk a straight line. Denies any falls.     Review of Systems   Constitutional: Negative for activity change, appetite change, fatigue, fever and unexpected weight change.   HENT: Positive for hearing loss. Negative for facial swelling.    Eyes: Negative.    Respiratory: Negative.    Cardiovascular: Negative.    Gastrointestinal: Negative for diarrhea, nausea and vomiting.   Endocrine: Negative.    Genitourinary: Negative.    Musculoskeletal: Positive for gait problem. Negative for back pain, joint swelling, myalgias and neck pain.    Neurological: Negative for dizziness, seizures, weakness, numbness and headaches.   Psychiatric/Behavioral: Negative.       Past Medical History:   Diagnosis Date    Anxiety     Colon polyp     benign    Depression     Elevated PSA     High cholesterol     Hypertension     Prostate cancer        Objective:      Vitals:    04/26/22 1324   BP: 124/83   Pulse: 102   Temp: 97.8 °F (36.6 °C)      Physical Exam  Constitutional:       General: He is not in acute distress.     Appearance: Normal appearance.   HENT:      Head: Normocephalic and atraumatic.      Left Ear: Decreased hearing noted.      Ears:      Comments: Patient with decreased hearing in the left ear.  Pulmonary:      Effort: Pulmonary effort is normal.   Musculoskeletal:      Cervical back: Neck supple.   Neurological:      Mental Status: He is alert and oriented to person, place, and time.      GCS: GCS eye subscore is 4. GCS verbal subscore is 5. GCS motor subscore is 6.      Cranial Nerves: No cranial nerve deficit.            I, Dr. David Monteiro, personally performed the services described in this documentation. All medical record entries made by the scribe, Tania Medrano, were at my direction and in my presence.  I have reviewed the chart and agree that the record reflects my personal performance and is accurate and complete. David Monteiro MD. 04/26/2022    Assessment:       Acoustic neuroma.  Imbalance.     Plan:   I will refer to physical therapy for balance training.     I will schedule the patient for 3 month follow up with MRI brain.

## 2022-05-09 ENCOUNTER — CLINICAL SUPPORT (OUTPATIENT)
Dept: REHABILITATION | Facility: HOSPITAL | Age: 72
End: 2022-05-09
Payer: MEDICARE

## 2022-05-09 DIAGNOSIS — D33.3 LEFT ACOUSTIC NEUROMA: ICD-10-CM

## 2022-05-09 DIAGNOSIS — R26.89 IMPAIRMENT OF BALANCE: Primary | ICD-10-CM

## 2022-05-09 PROCEDURE — 97530 THERAPEUTIC ACTIVITIES: CPT

## 2022-05-09 PROCEDURE — 97162 PT EVAL MOD COMPLEX 30 MIN: CPT

## 2022-05-09 NOTE — PLAN OF CARE
[facility]  Physical Therapy Initial Evaluation    Name: Adolfo Vazquez  Clinic Number: [id number]    Therapy Diagnosis:   Encounter Diagnosis   Name Primary?    Left acoustic neuroma      Physician: David Monteiro MD    Physician Orders: PT Eval and Treat   Medical Diagnosis from Referral: acoustic neuroma  Evaluation Date: 5/9/2022  Authorization Period Expiration: 4/20/23  Plan of Care Expiration: 8/1/22  Visit # / Visits authorized: 1/ 8    Time In: 8:48 am  Time Out: 9:32 am    Precautions: Standard, hearing impaired on L    Subjective   Date of onset: February 2022  History of current condition - Adolfo reports he starting having ringing and hearing loss in his left ear with perceived balance deficits earlier this year and was found to have a left acoustic neuroma. He underwent GSRS on 3/9/22 without complication and had audiogram which revealed mild sensorineural hearing loss on R, and mild to mod/severe sensorineural hearing loss on the L. His current complaints include hearing loss on left, ringing in left ear, and perceived balance issues with walking making him feel like he is going to fall, especially if he is carrying something. He denies having any dizziness or falls but does report some near misses in which he was able right himself to prevent falling. He feels like he drifts to the side when walking and is unable to walk in a straight line. Symptoms vary throughout the day but do not affect ADL's or driving. He will be getting a follow up MRI in the near future. He is referred to PT for eval and treatment of his gait and balance impairments.     Medical History:   Past Medical History:   Diagnosis Date    Anxiety     Colon polyp     benign    Depression     Elevated PSA     High cholesterol     Hypertension     Prostate cancer        Surgical History:   Adolfo Vazquez  has a past surgical history that includes Prostate surgery; Colon surgery; Colonoscopy (N/A,  1/24/2019); and Skin cancer excision.    Medications:   Adolfo has a current medication list which includes the following prescription(s): abilify, allopurinol, alprazolam, azelastine, bupropion, bupropion, ciclopirox, clonazepam, cymbalta, fluzone high-dose 2016-17 (pf), levothyroxine, losartan-hydrochlorothiazide 50-12.5 mg, shingrix (pf), and trazodone.    Allergies:   Review of patient's allergies indicates:  No Known Allergies     Imaging: recent MRI's not available in Epic    Prior Therapy: none  Social History: patient lives with he wife and his dog in a raised house with B HR's  Falls: patient reports a couple of near misses but was able to right himself to prevent falling   DME: RW and Straight cane    Home Environment: raised home   Exercise Routine / History: none   Family Present at time of Eval: no   Occupation: retired   Prior Level of Function: independent ADL's and IADL's  Current Level of Function: independent ADL's, drives, hesitant during gait due to balance and occasionally uses SPC   Pain:  Patient has no c/o pain  Patient's goals: improve balance    Objective     Observation: Patient is a well developed, well nourished male in NAD.  Posture: forward head, rounded shoulders, decreased lordosis  ROM:   UE's with shoulder flex to 130 degrees, distal joints WFL's  LE's decreased hip ext and IR b/l  Cervical spine limitations in all planes noted  Lumbar spine limited in ext and b/l side bending   Upper Extremity Strength  (R) UE  (L) UE    Shoulder flexion: 5/5 Shoulder flexion: 5/5   Shoulder Abduction: 5/5 Shoulder abduction: 5/5   Elbow flexion: 5/5 Elbow flexion: 5/5   Elbow extension: 5/5 Elbow extension: 5/5   Wrist flexion: 5/5 Wrist flexion: 5/5   Wrist extension: 5/5 Wrist extension: 5/5    5/5 : 5/5     Lower Extremity Strength  Right LE  Left LE    Knee extension: 5/5 Knee extension: 5/5   Knee flexion: 5/5 Knee flexion: 5/5   Hip flexion: 5/5 Hip flexion: 5/5   Hip  "extension:  5/5 Hip extension: 5/5   Hip abduction: 5/5 Hip abduction: 5/5   Hip adduction: 5/5 Hip adduction 5/5   Ankle dorsiflexion: 5/5 Ankle dorsiflexion: 5/5   Ankle plantarflexion: 5/5 Ankle plantarflexion: 5/5     Visual/Auditory:    Tracking: intact but with lag noted  Saccades: Intact  Acuity:Intact  R/L discrimination: Intact  Visual field: Intact  Auditory: mild sensorineural hearing loss on R, and mild to mod/severe sensorineural hearing loss on the L  Dorie-Hallpike: neg B  Gaze stability: impaired    Coordination:   - fine motor: intact  - Gross motor: intact    Sensation: intact  Proprioception: intact    Gait: ambulatory w/o AD in home and community environments, mild to mod deviation in path, increase CHARLES    Endurance Deficit: minimal     Balance Test:  SLS: Firm L 10 sec, R 2 sec  Alt Toe Tap test: 10 reps on 6" step in 30 seconds  30 sec chair rise: 7 reps with arms crossed  4 square step test: NT  Able to squat and  small abject from floor without dizziness or LOB  Static balance noncompliant surface: stance position and feet together eyes open x 30", eyes closed 30"  Compliant surface: stance position eyes open x 30", unable to maintain balance with eyes closed    Dynamic Gait Index  1. Gait on level surface: 3  2. Change in Gait speed: 2  3. Gait with Horizontal Head Turns: 1  4. Gait with Vertical Head Turns: 1  5. Gait and Pivot turn: 2  6. Step Over Obstacle: 2  7. Step around obstacles: 2  8. Steps: 2  Score: 15/24       HYLTON Assessment  1. Sitting to Standing   4 - able to stand without using hands and stabilize independently  2. Standing Unsupported   4 - able to stand safely 2 minutes without hold  3. Sitting Unsupported   4 - able to sit safely and securely 2 minutes  4. Standing to Sitting   4 - sits safely with minimal use of hands  5. Pivot Transfer   4 - able to trnasfer safely with minor use of hands  6. Standing with Eyes Closed   4 - albe to stand 10 seconds safely  7. " Standing with Feet Together   3 - able to place feet together independently and stand for 1 minute with supervision  8. Reaching Forward with Outstretched Arm   3 - can reach forward 12 cm/5 inches safely  9. Retrieving Object from Floor   4 - able to  slipper safely and easily  10. Turning to Look Behind   4 - looks behind from both sides and weights shifts well  11. Turning 360 Degrees   2 - able to turn 360 safely but slowly  12. Placing Alternate Foot on Step   3 - able to stand independently and completely 8 steps > 20 seconds  13. Standing with One Foot in Front   2 - able to take small step indenpendently and hold 30 seconds  14. Standing on One Foot   3- able to lift leg independently and hold 5-10 seconds  Total: 48  Maximum: 56    TREATMENT     Home Exercises and Patient Education Provided    Education provided re:   - progress towards goals   - role of therapy in multi - disciplinary team, goals for therapy  Pt educated on condition, POC, and expectations in therapy.  No spiritual or educational barriers to learning provided    Home exercises: Pt will be provided HEP during course of treatment with progressions as appropriate. Pt was advised to perform these exercises free of pain, and to stop performing them if pain occurs.   Adolfo demonstrated good  understanding of the education provided.     Assessment   Adolfo is a 71 y.o. male referred to outpatient physical therapy and presents to PT with balance impairment. Patient demonstrates limitations as described in the problem list. Pt will benefit from physcial therapy services in order to improve gait stability and balance in order to decrease fall risk. The following goals were discussed with the patient and patient is in agreement with them as to be addressed in the treatment plan.   Pt prognosis is Good.   Pt will benefit from skilled outpatient Physical Therapy to address the deficits stated above and in the chart below, provide pt/family  "education, and to maximize pt's level of independence.     Anticipated barriers to physical therapy: none identified    Medical necessity is demonstrated by the following IMPAIRMENTS/PROBLEM LIST:  gait instability, impaired balance, impaired joint extensibility and impaired muscle length    Plan of care discussed with patient: Yes  Patient's spiritual, cultural and educational needs considered and patient is agreeable to the plan of care and goals as stated below:     Anticipated Barriers for therapy: none    Goals:  Short Term Goals: 2 weeks   Patient compliant with initial exercises    Long Term Goals: 4 weeks   1. Improve score on DGI to >/= 22/24 to demonstrate improved gait stability  2. Perform > 10 sit to stands in 30" w/o UE assist to demonstrate increased LE strength and endurance  3. Maintain  4. Strengthen balance control in everyday activities to improve fall related self-efficacy, reduce fear of falling, and increase walking speed  5. Demonstrate improved posture to decrease fall risk  6. Demonstrate independence with home exercise program to maintain gains made in therapy      Plan   Pt will be treated by physical therapy 1-2 times a week for 4 weeks for Pt Education, HEP, therapeutic exercises, neuromuscular re-education, joint mobilizations, gait/balance training to achieve established goals. Adolfo may at times be seen by a PTA as part of the Rehab Team.     Cont PT for 4 weeks.     Paul Flores, PT    I certify the need for these services furnished under this plan of treatment and while under my care.______________________________ Physician/Referring Practitioner  Date of Signature    "

## 2022-05-13 ENCOUNTER — CLINICAL SUPPORT (OUTPATIENT)
Dept: REHABILITATION | Facility: HOSPITAL | Age: 72
End: 2022-05-13
Payer: MEDICARE

## 2022-05-13 DIAGNOSIS — R26.2 DIFFICULTY WALKING: Primary | ICD-10-CM

## 2022-05-13 PROCEDURE — 97110 THERAPEUTIC EXERCISES: CPT

## 2022-05-13 NOTE — PROGRESS NOTES
Physical Therapy Daily Note     Name: Adolfo Vazquez  Clinic Number: [id number]  Diagnosis: No diagnosis found.  Physician: David Monteiro MD  Precautions: falls  Visit #: 2 of 12  PTA Visit #: 0  Time In: 09:30 am  Time Out: 10:15 am    Subjective     Pt reports: he is still having balance difficulties.  Denies falls, states he was able to step onto his boat   Pain Scale: Adolfo rates pain on a scale of 0-10 to be 1 currently.    Objective     Adolfo received individual therapeutic exercises to develop endurance, core stabilization and balance  for 38 minutes including:  nustep x 8  Balance beam. Forward heel to toe,// bar  bosu ball stabilization  Sideways amb in // bars on balance beam   SLS on airex martha LOWER EXTREMITY   BLE stance on airex  Toe raises at steps,  Min assisted HHA side steps and backwards walking  Steps  Forward  Steps sideways   GAIT training with  spc on level and unlevel surfaces sba  The patient received the following supervised modalities after being cleared for contradictions:   Written Home Exercises Provided: for balance activities   Pt demo good understanding of the education provided. Adolfo demonstrated good return demonstration of activities.     Education provided re:  Adolfo verbalized good understanding of education provided.   No spiritual or educational barriers to learning provided    Assessment     Patient tolerated skilled services to improve strength, balance and gait deficits  He continues to present with limited dynamic balance   He remains at fall risk with activities   This is a 71 y.o. male referred to outpatient physical therapy and presents with a medical diagnosis of left acoustic neuroma  and demonstrates limitations as described in the problem list. Pt prognosis is Good. Pt will continue to benefit from skilled outpatient physical therapy to address the deficits listed in the problem list, provide pt/family  education and to maximize pt's level of independence in the home and community environment.     Goals as follows:  To restore good dynamic standing balance, and improve gait to wfl without SPC      Plan     Continue with established Plan of Care towards PT goals.    Therapist: Shabnam Denis, PT  5/13/2022

## 2022-05-16 ENCOUNTER — CLINICAL SUPPORT (OUTPATIENT)
Dept: REHABILITATION | Facility: HOSPITAL | Age: 72
End: 2022-05-16
Payer: MEDICARE

## 2022-05-16 DIAGNOSIS — R26.89 IMPAIRMENT OF BALANCE: Primary | ICD-10-CM

## 2022-05-16 PROCEDURE — 97110 THERAPEUTIC EXERCISES: CPT

## 2022-05-16 NOTE — PROGRESS NOTES
Physical Therapy Daily Note     Name: Adolfo Vazquez  Clinic Number: [id number]  Diagnosis: No diagnosis found.  Physician: David Monteiro MD  Precautions: falls  Visit #: 3 of 12  PTA Visit #: 0  Time In: 11:00 am  Time Out: 11:45 am    Subjective     Pt reports: he is still having balance difficulties.  Denies falls, states he was able to step onto his boat   Pain Scale: Adolfo rates pain on a scale of 0-10 to be 1 currently.    Objective     Adolfo received individual therapeutic exercises to develop endurance, core stabilization and balance  for 38 minutes including:  nustep x 8  Balance beam. Forward heel to toe,// bar  bosu ball stabilization  Sideways amb in // bars on balance beam   SLS on airex martha LOWER EXTREMITY   BLE stance on airex  Toe raises at steps,  Min assisted HHA side steps and backwards walking  Steps  Forward  Steps sideways   GAIT training with  spc on level and unlevel surfaces sba  Grounders, GTB  Putting GTB  reebok step overs martha // bars   Bridges with SB  LTR SB    The patient received the following supervised modalities after being cleared for contradictions:   Written Home Exercises Provided: for balance activities   Pt demo good understanding of the education provided. Adolfo demonstrated good return demonstration of activities.     Education provided re:  Adolfo verbalized good understanding of education provided.   No spiritual or educational barriers to learning provided    Assessment     Patient tolerated skilled services to improve strength, balance and gait deficits  He continues to present with limited dynamic balance   He remains at fall risk with activities   This is a 71 y.o. male referred to outpatient physical therapy and presents with a medical diagnosis of left acoustic neuroma  and demonstrates limitations as described in the problem list. Pt prognosis is Good. Pt will continue to benefit from skilled outpatient  physical therapy to address the deficits listed in the problem list, provide pt/family education and to maximize pt's level of independence in the home and community environment.     Goals as follows:  To restore good dynamic standing balance, and improve gait to wfl without SPC      Plan     Continue with established Plan of Care towards PT goals.    Therapist: Shabnam Denis, PT  5/16/2022

## 2022-05-18 ENCOUNTER — CLINICAL SUPPORT (OUTPATIENT)
Dept: REHABILITATION | Facility: HOSPITAL | Age: 72
End: 2022-05-18
Payer: MEDICARE

## 2022-05-18 DIAGNOSIS — R26.2 DIFFICULTY WALKING: Primary | ICD-10-CM

## 2022-05-18 PROCEDURE — 97110 THERAPEUTIC EXERCISES: CPT

## 2022-05-18 NOTE — PROGRESS NOTES
Physical Therapy Daily Note     Name: Adolfo Vazquez  Clinic Number: [id number]  Diagnosis: No diagnosis found.  Physician: No ref. provider found  Precautions: falls  Visit #: 4 of 12  PTA Visit #: 0  Time In: 11:00 am  Time Out: 11:45 am    Subjective     Pt reports: he is still having balance difficulties.  Denies falls, states he was able to step onto his boat   Pain Scale: Adolfo rates pain on a scale of 0-10 to be 1 currently.    Objective     Adolfo received individual therapeutic exercises to develop endurance, core stabilization and balance  for 38 minutes including:  nustep x 8  Balance beam. Forward heel to toe,// bar  bosu ball stabilization  Sideways amb in // bars on balance beam   SLS on airex martha LOWER EXTREMITY   BLE stance on airex  Toe raises at steps,  Min assisted HHA side steps and backwards walking  Steps  Forward  Steps sideways   GAIT training with  spc on level and unlevel surfaces sba  Grounders, GTB  Putting GTB  reebok step overs martha // bars   Bridges with SB  LTR SB    The patient received the following supervised modalities after being cleared for contradictions:   Written Home Exercises Provided: for balance activities   Pt demo good understanding of the education provided. Adolfo demonstrated good return demonstration of activities.     Education provided re:  Adolfo verbalized good understanding of education provided.   No spiritual or educational barriers to learning provided    Assessment     Patient tolerated skilled services to improve strength, balance and gait deficits  He continues to present with limited dynamic balance   He remains at fall risk with activities   This is a 71 y.o. male referred to outpatient physical therapy and presents with a medical diagnosis of left acoustic neuroma  and demonstrates limitations as described in the problem list. Pt prognosis is Good. Pt will continue to benefit from skilled outpatient  physical therapy to address the deficits listed in the problem list, provide pt/family education and to maximize pt's level of independence in the home and community environment.     Goals as follows:  To restore good dynamic standing balance, and improve gait to wfl without SPC      Plan     Continue with established Plan of Care towards PT goals.    Therapist: Shabnam Denis, PT  5/18/2022

## 2022-06-07 ENCOUNTER — CLINICAL SUPPORT (OUTPATIENT)
Dept: REHABILITATION | Facility: HOSPITAL | Age: 72
End: 2022-06-07
Payer: MEDICARE

## 2022-06-07 DIAGNOSIS — R29.818 DIFFICULTY BALANCING: ICD-10-CM

## 2022-06-07 DIAGNOSIS — M67.88 ACHILLES TENDINOSIS OF LEFT LOWER EXTREMITY: Primary | ICD-10-CM

## 2022-06-07 PROBLEM — R26.2 DIFFICULTY WALKING DOWN STAIRS: Status: ACTIVE | Noted: 2022-06-07

## 2022-06-07 PROCEDURE — 97110 THERAPEUTIC EXERCISES: CPT

## 2022-06-07 NOTE — PROGRESS NOTES
Physical Therapy Daily Note     Name: Adolfo Vazquez  Clinic Number: [id number]  Diagnosis:   Encounter Diagnoses   Name Primary?    Achilles tendinosis of left lower extremity Yes    Difficulty balancing      Physician: David Monteiro MD  Precautions: falls  Visit #: 5 of 12  PTA Visit #: 0  Time In: 1220 pm  Time Out: 105 pm    Subjective     Pt reports: he is still having balance difficulties.  Denies falls, states he was able to step onto his boat   Pain Scale: Adolfo rates pain on a scale of 0-10 to be 1 currently.    Objective     Adolfo received individual therapeutic exercises to develop endurance, core stabilization and balance  for 38 minutes including:  nustep x 8  Balance beam. Forward heel to toe,// bar  bosu ball stabilization   Sideways amb in // bars on balance beam   SLS on airex martha LOWER EXTREMITY   BLE stance on airex  Toe raises at steps,  Min assisted HHA side steps and backwards walking  Steps  Forward  Steps sideways   GAIT training with  spc on level and unlevel surfaces sba  Grounders, GTB  Putting GTB  Closed chain TKE 4 in step x 20 each   reebok step overs martha // bars   Bridges with SB  LTR SB    The patient received the following supervised modalities after being cleared for contradictions:   Written Home Exercises Provided: for balance activities   Pt demo good understanding of the education provided. Adolfo demonstrated good return demonstration of activities.     Education provided re:  Adolfo verbalized good understanding of education provided.   No spiritual or educational barriers to learning provided    Assessment     Patient tolerated skilled services to improve strength, balance and gait deficits  He continues to present with limited dynamic balance   He remains at fall risk with activities   Remains limited with balance skills  Needs use of a SPC at times for safety    This is a 71 y.o. male referred to outpatient physical  therapy and presents with a medical diagnosis of left acoustic neuroma  and demonstrates limitations as described in the problem list. Pt prognosis is Good. Pt will continue to benefit from skilled outpatient physical therapy to address the deficits listed in the problem list, provide pt/family education and to maximize pt's level of independence in the home and community environment.     Goals as follows:  To restore good dynamic standing balance, and improve gait to wfl without SPC      Plan     Continue with established Plan of Care towards PT goals.    Therapist: Shabnam Denis, PT  6/7/2022

## 2022-06-09 ENCOUNTER — CLINICAL SUPPORT (OUTPATIENT)
Dept: REHABILITATION | Facility: HOSPITAL | Age: 72
End: 2022-06-09
Payer: MEDICARE

## 2022-06-09 DIAGNOSIS — R29.818 DIFFICULTY BALANCING: Primary | ICD-10-CM

## 2022-06-09 PROCEDURE — 97110 THERAPEUTIC EXERCISES: CPT

## 2022-06-09 NOTE — PROGRESS NOTES
Physical Therapy Daily Note     Name: Adolfo Vazquez  Clinic Number: [id number]  Diagnosis:   Encounter Diagnosis   Name Primary?    Difficulty balancing Yes     Physician: David Monteiro MD  Precautions: falls  Visit #: 6 of 12  PTA Visit #: 0  Time In: 1100 am  Time Out: 1140am    Subjective     Pt reports:feels like he is getting better. Continues to report no falls   Pain Scale: Adolfo rates pain on a scale of 0-10 to be 1 currently.    Objective     Adolfo received individual therapeutic exercises to develop endurance, core stabilization and balance  for 38 minutes including:  nustep x 8  Balance beam. Forward heel to toe,// bar  bosu ball stabilization   Sideways amb in // bars on balance beam   SLS on airex martha LOWER EXTREMITY   Tandem BLE stance on airex beam  Toe raises at steps,  Min assisted HHA side steps and backwards walking  Steps  Forward  Steps sideways   GAIT training with  spc on level and unlevel surfaces sba  Grounders, GTB  Putting GTB  Closed chain TKE 4 in step x 20 each   reebok step overs martha // bars   Bridges with SB  LTR SB  SAQ on bolster 8# x 30 each     The patient received the following supervised modalities after being cleared for contradictions:   Written Home Exercises Provided: for balance activities   Pt demo good understanding of the education provided. Adolfo demonstrated good return demonstration of activities.     Education provided re:  Adolfo verbalized good understanding of education provided.   No spiritual or educational barriers to learning provided    Assessment     Patient tolerated skilled services to improve strength, balance and gait deficits  He continues to present with limited dynamic balance   He remains at fall risk with activities   Remains limited with balance skills  Needs use of a SPC at times for safety    This is a 71 y.o. male referred to outpatient physical therapy and presents with a medical  diagnosis of left acoustic neuroma  and demonstrates limitations as described in the problem list. Pt prognosis is Good. Pt will continue to benefit from skilled outpatient physical therapy to address the deficits listed in the problem list, provide pt/family education and to maximize pt's level of independence in the home and community environment.     Goals as follows:  To restore good dynamic standing balance, and improve gait to wfl without SPC      Plan     Continue with established Plan of Care towards PT goals.    Therapist: Shabnam Denis, PT  6/9/2022

## 2022-06-17 ENCOUNTER — CLINICAL SUPPORT (OUTPATIENT)
Dept: REHABILITATION | Facility: HOSPITAL | Age: 72
End: 2022-06-17
Payer: MEDICARE

## 2022-06-17 DIAGNOSIS — R29.818 DIFFICULTY BALANCING: Primary | ICD-10-CM

## 2022-06-17 PROCEDURE — 97110 THERAPEUTIC EXERCISES: CPT

## 2022-06-17 NOTE — PROGRESS NOTES
Physical Therapy Daily Note     Name: Adolfo Vazquez  Clinic Number: [id number]  Diagnosis:   Encounter Diagnosis   Name Primary?    Difficulty balancing Yes     Physician: David Monteiro MD  Precautions: falls  Visit #: 7 of 12  PTA Visit #: 0  Time In: 1225  Time Out: 110    Subjective     Pt reports:feels like he is getting better. Continues to report no falls   Pain Scale: Adolfo rates pain on a scale of 0-10 to be 1 currently.    Objective     Adolfo received individual therapeutic exercises to develop endurance, core stabilization and balance  for 38 minutes including:  nustep x 8  Leg press 70 # x 30  Balance beam. Forward heel to toe,// bar  bosu ball stabilization   Sideways amb in // bars on balance beam   SLS on airex martha LOWER EXTREMITY   Tandem BLE stance on airex beam  Toe raises at steps,  Min assisted HHA side steps and backwards walking  Steps  Forward  Steps sideways   GAIT training with  spc on level and unlevel surfaces sba  Grounders, GTB  Putting GTB  Closed chain TKE 4 in step x 20 each   reebok step overs martha // bars   Bridges with SB  LTR SB  SAQ on bolster 8# x 30 each       The patient received the following supervised modalities after being cleared for contradictions:   Written Home Exercises Provided: for balance activities   Pt demo good understanding of the education provided. Adolfo demonstrated good return demonstration of activities.     Education provided re:  Adolfo verbalized good understanding of education provided.   No spiritual or educational barriers to learning provided    Assessment     Patient tolerated skilled services to improve strength, balance and gait deficits  He remains at fall risk with activities   Balance is definitely improving as evidenced by dynamic balance exercises   ind gait without SPC     This is a 71 y.o. male referred to outpatient physical therapy and presents with a medical diagnosis of left  acoustic neuroma  and demonstrates limitations as described in the problem list. Pt prognosis is Good. Pt will continue to benefit from skilled outpatient physical therapy to address the deficits listed in the problem list, provide pt/family education and to maximize pt's level of independence in the home and community environment.     Goals as follows:  To restore good dynamic standing balance, and improve gait to wfl without SPC      Plan     Continue with established Plan of Care towards PT goals.    Therapist: Shabnam Denis, PT  6/17/2022

## 2022-06-20 ENCOUNTER — CLINICAL SUPPORT (OUTPATIENT)
Dept: REHABILITATION | Facility: HOSPITAL | Age: 72
End: 2022-06-20
Payer: MEDICARE

## 2022-06-20 DIAGNOSIS — R29.818 DIFFICULTY BALANCING: Primary | ICD-10-CM

## 2022-06-20 PROCEDURE — 97110 THERAPEUTIC EXERCISES: CPT

## 2022-06-20 NOTE — PROGRESS NOTES
Physical Therapy Daily Note     Name: Adolfo Vazquez  Clinic Number: [id number]  Diagnosis:   Encounter Diagnosis   Name Primary?    Difficulty balancing Yes     Physician: David Monteiro MD  Precautions: falls  Visit #: 8 of 12  PTA Visit #: 0  Time In: 1053  Time Out: 1135    Subjective     Pt reports:feels like he is getting better. Continues to report no falls   Pain Scale: Adolfo rates pain on a scale of 0-10 to be 1 currently.    Objective     Adolfo received individual therapeutic exercises to develop endurance, core stabilization and balance  for 38 minutes including:  nustep x 8  Leg press 70 # x 30  Balance beam. Forward heel to toe,// bar  bosu ball stabilization   Sideways amb in // bars on balance beam   SLS on airex martha LOWER EXTREMITY   Tandem BLE stance on airex beam  Toe raises at steps,  Min assisted HHA side steps and backwards walking  Steps  Forward  Steps sideways   GAIT training with  spc on level and unlevel surfaces sba  Grounders, GTB  Putting GTB  Closed chain TKE 4 in step x 20 each   reebok step overs martha // bars   Bridges with SB  LTR SB  SAQ on bolster 8# x 30 each       The patient received the following supervised modalities after being cleared for contradictions:   Written Home Exercises Provided: for balance activities   Pt demo good understanding of the education provided. Adolfo demonstrated good return demonstration of activities.     Education provided re:  Adolfo verbalized good understanding of education provided.   No spiritual or educational barriers to learning provided    Assessment     Patient tolerated skilled services to improve strength, balance and gait deficits  He remains at fall risk with activities   Balance is definitely improving as evidenced by dynamic balance exercises   ind gait without SPC     This is a 71 y.o. male referred to outpatient physical therapy and presents with a medical diagnosis of left  acoustic neuroma  and demonstrates limitations as described in the problem list. Pt prognosis is Good. Pt will continue to benefit from skilled outpatient physical therapy to address the deficits listed in the problem list, provide pt/family education and to maximize pt's level of independence in the home and community environment.     Goals as follows:  To restore good dynamic standing balance, and improve gait to wfl without SPC      Plan     Continue with established Plan of Care towards PT goals.    Therapist: Shabnam Denis, PT  6/20/2022

## 2022-06-27 ENCOUNTER — CLINICAL SUPPORT (OUTPATIENT)
Dept: REHABILITATION | Facility: HOSPITAL | Age: 72
End: 2022-06-27
Payer: MEDICARE

## 2022-06-27 DIAGNOSIS — R29.818 DIFFICULTY BALANCING: Primary | ICD-10-CM

## 2022-06-27 PROCEDURE — 97110 THERAPEUTIC EXERCISES: CPT

## 2022-06-27 NOTE — PROGRESS NOTES
Physical Therapy Daily Note     Name: Adolfo Vazquez  Clinic Number: [id number]  Diagnosis:   Encounter Diagnosis   Name Primary?    Difficulty balancing Yes     Physician: David Monteiro MD  Precautions: falls  Visit #: 8 of 12  PTA Visit #: 0  Time In: 1100  Time Out: 1145    Subjective     Pt reports:feels like he is getting better. Continues to report no falls   Pain Scale: Adolfo rates pain on a scale of 0-10 to be 1 currently.    Objective     Adolfo received individual therapeutic exercises to develop endurance, core stabilization and balance  for 38 minutes including:  nustep x 8  Leg press 70 # x 30  Balance beam. Forward heel to toe,// bar  bosu ball stabilization   Sideways amb in // bars on balance beam   SLS on airex martha LOWER EXTREMITY   Tandem BLE stance on airex beam  Toe raises at steps,  Min assisted HHA side steps and backwards walking  Steps  Forward  Steps sideways   GAIT training with  spc on level and unlevel surfaces sba  Grounders, GTB  Putting GTB  Closed chain TKE 4 in step x 20 each   reebok step overs martha // bars   Bridges with SB  LTR SB  SAQ on bolster 8# x 30 each       The patient received the following supervised modalities after being cleared for contradictions:   Written Home Exercises Provided: for balance activities   Pt demo good understanding of the education provided. Adolfo demonstrated good return demonstration of activities.     Education provided re:  Adolfo verbalized good understanding of education provided.   No spiritual or educational barriers to learning provided    Assessment     Patient tolerated skilled services to improve strength, balance and gait deficits  He remains at fall risk with activities   Balance is definitely improving as evidenced by dynamic balance exercises   ind gait without SPC     This is a 71 y.o. male referred to outpatient physical therapy and presents with a medical diagnosis of left  "acoustic neuroma  and demonstrates limitations as described in the problem list. Pt prognosis is Good. Pt will continue to benefit from skilled outpatient physical therapy to address the deficits listed in the problem list, provide pt/family education and to maximize pt's level of independence in the home and community environment.     Goals as follows:  To restore good dynamic standing balance, and improve gait to wfl without SPC      Goals:  Short Term Goals: 2 weeks   Patient compliant with initial exercises  MET     Long Term Goals: 4 weeks   1. Improve score on DGI to >/= 22/24 to demonstrate improved gait stability  2. Perform > 10 sit to stands in 30" w/o UE assist to demonstrate increased LE strength and endurance  3. Maintain  4. Strengthen balance control in everyday activities to improve fall related self-efficacy, reduce fear of falling, and increase walking speed Progressing   5. Demonstrate improved posture to decrease fall risk Progressing   6. Demonstrate independence with home exercise program to maintain gains made in therapy   Plan     Continue with established Plan of Care towards PT goals.    Therapist: Shabnam Denis, PT  6/27/2022      "

## 2022-06-29 ENCOUNTER — CLINICAL SUPPORT (OUTPATIENT)
Dept: REHABILITATION | Facility: HOSPITAL | Age: 72
End: 2022-06-29
Payer: MEDICARE

## 2022-06-29 DIAGNOSIS — R29.818 DIFFICULTY BALANCING: Primary | ICD-10-CM

## 2022-06-29 PROCEDURE — 97110 THERAPEUTIC EXERCISES: CPT

## 2022-06-29 NOTE — PROGRESS NOTES
Physical Therapy Daily Note     Name: Adolfo Vazquez  Clinic Number: [id number]  Diagnosis:   Encounter Diagnosis   Name Primary?    Difficulty balancing Yes     Physician: David Monteiro MD  Precautions: falls  Visit #: 10 of 12  PTA Visit #: 0  Time In: 1235  Time Out: 115    Subjective     Pt reports:feels like he is getting better. Continues to report no falls   Pain Scale: Adolfo rates pain on a scale of 0-10 to be 1 currently.    Objective     Adolfo received individual therapeutic exercises to develop endurance, core stabilization and balance  for 38 minutes including:  nustep x 8  Leg press 70 # x 30  Balance beam. Forward heel to toe,// bar  bosu ball stabilization   Sideways amb in // bars on balance beam   SLS on airex martha LOWER EXTREMITY   Tandem BLE stance on airex beam  Toe raises at steps,  Min assisted HHA side steps and backwards walking  Steps  Forward  Steps sideways   GAIT training with  spc on level and unlevel surfaces sba  Grounders, GTB  Putting GTB  Closed chain TKE 4 in step x 20 each   reebok step overs martha // bars   Bridges with SB  LTR SB  SAQ on bolster 8# x 30 each       The patient received the following supervised modalities after being cleared for contradictions:   Written Home Exercises Provided: for balance activities   Pt demo good understanding of the education provided. Adolfo demonstrated good return demonstration of activities.     Education provided re:  Adolfo verbalized good understanding of education provided.   No spiritual or educational barriers to learning provided    Assessment     Patient tolerated skilled services to improve strength, balance and gait deficits  He remains at fall risk with activities   Balance is definitely improving as evidenced by dynamic balance exercises   ind gait without SPC     This is a 71 y.o. male referred to outpatient physical therapy and presents with a medical diagnosis of left  "acoustic neuroma  and demonstrates limitations as described in the problem list. Pt prognosis is Good. Pt will continue to benefit from skilled outpatient physical therapy to address the deficits listed in the problem list, provide pt/family education and to maximize pt's level of independence in the home and community environment.     Goals as follows:  To restore good dynamic standing balance, and improve gait to wfl without SPC      Goals:  Short Term Goals: 2 weeks   Patient compliant with initial exercises  MET     Long Term Goals: 4 weeks   1. Improve score on DGI to >/= 22/24 to demonstrate improved gait stability  2. Perform > 10 sit to stands in 30" w/o UE assist to demonstrate increased LE strength and endurance  3. Maintain  4. Strengthen balance control in everyday activities to improve fall related self-efficacy, reduce fear of falling, and increase walking speed Progressing   5. Demonstrate improved posture to decrease fall risk Progressing   6. Demonstrate independence with home exercise program to maintain gains made in therapy   Plan     Continue with established Plan of Care towards PT goals.    Therapist: Shabnam Denis, PT  6/29/2022      "

## 2022-07-05 ENCOUNTER — CLINICAL SUPPORT (OUTPATIENT)
Dept: REHABILITATION | Facility: HOSPITAL | Age: 72
End: 2022-07-05
Payer: MEDICARE

## 2022-07-05 DIAGNOSIS — R29.818 DIFFICULTY BALANCING: Primary | ICD-10-CM

## 2022-07-05 PROCEDURE — 97110 THERAPEUTIC EXERCISES: CPT

## 2022-07-05 NOTE — PROGRESS NOTES
Physical Therapy Daily Note     Name: Adolfo Vazquez  Clinic Number: [id number]  Diagnosis:   Encounter Diagnosis   Name Primary?    Difficulty balancing Yes     Physician: David Monteiro MD  Precautions: falls  Visit #: 11 of 12  PTA Visit #: 0  Time In: 1230  Time Out: 115    Subjective     Pt reports:feels like he is getting better. Continues to report no falls   Pain Scale: Adolfo rates pain on a scale of 0-10 to be 1 currently.    Objective     Adolfo received individual therapeutic exercises to develop endurance, core stabilization and balance  for 38 minutes including:  nustep x 8  Leg press 70 # x 30  Balance beam. Forward heel to toe,// bar  bosu ball stabilization   Sideways amb in // bars on balance beam   SLS on airex martha LOWER EXTREMITY   Tandem BLE stance on airex beam  Toe raises at steps,  Min assisted HHA side steps and backwards walking  Steps  Forward  Steps sideways   GAIT training with  spc on level and unlevel surfaces sba  Grounders, GTB  Putting GTB  Closed chain TKE 4 in step x 20 each   reebok step overs martha // bars   Bridges with SB  LTR SB  SAQ on bolster 8# x 30 each       The patient received the following supervised modalities after being cleared for contradictions:   Written Home Exercises Provided: for balance activities   Pt demo good understanding of the education provided. Adolfo demonstrated good return demonstration of activities.     Education provided re:  Adolfo verbalized good understanding of education provided.   No spiritual or educational barriers to learning provided    Assessment     Patient tolerated skilled services to improve strength, balance and gait deficits  He remains at fall risk with activities   Balance is definitely improving as evidenced by dynamic balance exercises   ind gait without SPC     This is a 71 y.o. male referred to outpatient physical therapy and presents with a medical diagnosis of left  "acoustic neuroma  and demonstrates limitations as described in the problem list. Pt prognosis is Good. Pt will continue to benefit from skilled outpatient physical therapy to address the deficits listed in the problem list, provide pt/family education and to maximize pt's level of independence in the home and community environment.     Goals as follows:  To restore good dynamic standing balance, and improve gait to wfl without SPC      Goals:  Short Term Goals: 2 weeks   Patient compliant with initial exercises  MET     Long Term Goals: 4 weeks   1. Improve score on DGI to >/= 22/24 to demonstrate improved gait stability  2. Perform > 10 sit to stands in 30" w/o UE assist to demonstrate increased LE strength and endurance  3. Maintain  4. Strengthen balance control in everyday activities to improve fall related self-efficacy, reduce fear of falling, and increase walking speed Progressing   5. Demonstrate improved posture to decrease fall risk Progressing   6. Demonstrate independence with home exercise program to maintain gains made in therapy   Plan     Continue with established Plan of Care towards PT goals.    Therapist: Shabnam Denis, PT  7/5/2022      "

## 2022-07-13 ENCOUNTER — PATIENT MESSAGE (OUTPATIENT)
Dept: NEUROSURGERY | Facility: CLINIC | Age: 72
End: 2022-07-13
Payer: MEDICARE

## 2022-07-14 ENCOUNTER — CLINICAL SUPPORT (OUTPATIENT)
Dept: REHABILITATION | Facility: HOSPITAL | Age: 72
End: 2022-07-14
Payer: MEDICARE

## 2022-07-14 DIAGNOSIS — R29.818 DIFFICULTY BALANCING: Primary | ICD-10-CM

## 2022-07-14 PROCEDURE — 97110 THERAPEUTIC EXERCISES: CPT

## 2022-07-14 NOTE — PROGRESS NOTES
Physical Therapy Daily Note     Name: Adolfo Vazquez  Clinic Number: [id number]  Diagnosis:   Encounter Diagnosis   Name Primary?    Difficulty balancing Yes     Physician: David Monteiro MD  Precautions: falls  Visit #: 12 of 12  PTA Visit #: 0  Time In: 1215  Time Out: 100    Subjective     Pt reports:states his balance is worse   Pain Scale: Adolfo rates pain on a scale of 0-10 to be 1 currently.    Objective     Adolfo received individual therapeutic exercises to develop endurance, core stabilization and balance  for 38 minutes including:  nustep x 8  Leg press 70 # x 30  Balance beam. Forward heel to toe,// bar  bosu ball stabilization   Sideways amb in // bars on balance beam   SLS on airex martha LOWER EXTREMITY   Tandem BLE stance on airex beam  Toe raises at steps,  Min assisted HHA side steps and backwards walking  Steps  Forward  Steps sideways   GAIT training with  spc on level and unlevel surfaces sba  Grounders, GTB  Putting GTB  Closed chain TKE 4 in step x 20 each   reebok step overs martha // bars   Bridges with SB  LTR SB  SAQ on bolster 8# x 30 each       The patient received the following supervised modalities after being cleared for contradictions:   Written Home Exercises Provided: for balance activities   Pt demo good understanding of the education provided. Adolfo demonstrated good return demonstration of activities.     Education provided re:  Adolfo verbalized good understanding of education provided.   No spiritual or educational barriers to learning provided    Assessment     Patient tolerated skilled services to improve strength, balance and gait deficits  He remains at fall risk with activities   Balance is definitely improving as evidenced by dynamic balance exercises   ind gait without SPC     This is a 71 y.o. male referred to outpatient physical therapy and presents with a medical diagnosis of left acoustic neuroma  and demonstrates  "limitations as described in the problem list. Pt prognosis is Good. Pt will continue to benefit from skilled outpatient physical therapy to address the deficits listed in the problem list, provide pt/family education and to maximize pt's level of independence in the home and community environment.     Goals as follows:  To restore good dynamic standing balance, and improve gait to wfl without SPC      Goals:  Short Term Goals: 2 weeks   Patient compliant with initial exercises  MET     Long Term Goals: 4 weeks   1. Improve score on DGI to >/= 22/24 to demonstrate improved gait stability  2. Perform > 10 sit to stands in 30" w/o UE assist to demonstrate increased LE strength and endurance  3. Maintain  4. Strengthen balance control in everyday activities to improve fall related self-efficacy, reduce fear of falling, and increase walking speed Progressing   5. Demonstrate improved posture to decrease fall risk Progressing   6. Demonstrate independence with home exercise program to maintain gains made in therapy   Plan     Continue with established Plan of Care towards PT goals.    Therapist: Shabnam Denis, PT  7/14/2022      "

## 2022-07-19 ENCOUNTER — CLINICAL SUPPORT (OUTPATIENT)
Dept: REHABILITATION | Facility: HOSPITAL | Age: 72
End: 2022-07-19
Payer: MEDICARE

## 2022-07-19 DIAGNOSIS — R29.818 DIFFICULTY BALANCING: Primary | ICD-10-CM

## 2022-07-19 PROCEDURE — 97110 THERAPEUTIC EXERCISES: CPT

## 2022-07-19 NOTE — PROGRESS NOTES
Physical Therapy Daily Note     Name: Adolfo Vazquez  Clinic Number: [id number]  Diagnosis:   Encounter Diagnosis   Name Primary?    Difficulty balancing Yes     Physician: David Monteiro MD  Precautions: falls  Visit #: 12 of 20  PTA Visit #: 0  Time In: 1215  Time Out: 100    Subjective     Pt reports:states his balance is worse   Pain Scale: Adolfo rates pain on a scale of 0-10 to be 1 currently.    Objective     Adolfo received individual therapeutic exercises to develop endurance, core stabilization and balance  for 38 minutes including:  nustep x 8  Leg press 70 # x 30  Balance beam. Forward heel to toe,// bar  bosu ball stabilization   Sideways amb in // bars on balance beam   SLS on airex martha LOWER EXTREMITY   Tandem BLE stance on airex beam  Toe raises at steps,  Min assisted HHA side steps and backwards walking  Steps  Forward  Steps sideways   GAIT training with  spc on level and unlevel surfaces sba  Grounders, GTB  Putting GTB  Closed chain TKE 4 in step x 20 each   reebok step overs martha // bars   Bridges with SB  LTR SB  SAQ on bolster 8# x 30 each       The patient received the following supervised modalities after being cleared for contradictions:   Written Home Exercises Provided: for balance activities   Pt demo good understanding of the education provided. Adolfo demonstrated good return demonstration of activities.     Education provided re:  Adolfo verbalized good understanding of education provided.   No spiritual or educational barriers to learning provided    Assessment     Patient tolerated skilled services to improve strength, balance and gait deficits  He remains at fall risk with activities   Balance is definitely improving as evidenced by dynamic balance exercises   ind gait without SPC     This is a 71 y.o. male referred to outpatient physical therapy and presents with a medical diagnosis of left acoustic neuroma  and demonstrates  "limitations as described in the problem list. Pt prognosis is Good. Pt will continue to benefit from skilled outpatient physical therapy to address the deficits listed in the problem list, provide pt/family education and to maximize pt's level of independence in the home and community environment.     Goals as follows:  To restore good dynamic standing balance, and improve gait to wfl without SPC      Goals:  Short Term Goals: 2 weeks   Patient compliant with initial exercises  MET     Long Term Goals: 4 weeks   1. Improve score on DGI to >/= 22/24 to demonstrate improved gait stability  2. Perform > 10 sit to stands in 30" w/o UE assist to demonstrate increased LE strength and endurance  3. Maintain  4. Strengthen balance control in everyday activities to improve fall related self-efficacy, reduce fear of falling, and increase walking speed Progressing   5. Demonstrate improved posture to decrease fall risk Progressing   6. Demonstrate independence with home exercise program to maintain gains made in therapy   Plan     Continue with established Plan of Care towards PT goals.    Therapist: Shabnam Denis, PT  7/19/2022      "

## 2022-07-21 ENCOUNTER — CLINICAL SUPPORT (OUTPATIENT)
Dept: REHABILITATION | Facility: HOSPITAL | Age: 72
End: 2022-07-21
Payer: MEDICARE

## 2022-07-21 DIAGNOSIS — R29.818 DIFFICULTY BALANCING: Primary | ICD-10-CM

## 2022-07-21 PROCEDURE — 97110 THERAPEUTIC EXERCISES: CPT

## 2022-07-21 NOTE — PROGRESS NOTES
Physical Therapy Daily Note     Name: Adolfo Vazquez  Clinic Number: [id number]  Diagnosis:   Encounter Diagnosis   Name Primary?    Difficulty balancing Yes     Physician: David Monteiro MD  Precautions: falls  Visit #: 13 of 20  PTA Visit #: 0  Time In: 1205  Time Out: 1245    Subjective     Pt reports:states his balance is a little better  Pain Scale: Adolfo rates pain on a scale of 0-10 to be 1 currently.    Objective     Adolfo received individual therapeutic exercises to develop endurance, core stabilization and balance  for 38 minutes including:  nustep x 8  Leg press 70 # x 30  Balance beam. Forward heel to toe,// bar  bosu ball stabilization   Sideways amb in // bars on balance beam   SLS on airex martha LOWER EXTREMITY   Tandem BLE stance on airex beam  Toe raises at steps,  Min assisted HHA side steps and backwards walking  Steps  Forward  Steps sideways   GAIT training with  spc on level and unlevel surfaces sba  Grounders, GTB  Putting GTB  Closed chain TKE 4 in step x 20 each   reebok step overs martha // bars   Bridges with SB  LTR SB  SAQ on bolster 8# x 30 each       The patient received the following supervised modalities after being cleared for contradictions:   Written Home Exercises Provided: for balance activities   Pt demo good understanding of the education provided. Adolfo demonstrated good return demonstration of activities.     Education provided re:  Adolfo verbalized good understanding of education provided.   No spiritual or educational barriers to learning provided    Assessment     Patient tolerated skilled services to improve strength, balance and gait deficits  He remains at fall risk with activities   Balance is definitely improving as evidenced by dynamic balance exercises   ind gait without SPC     This is a 71 y.o. male referred to outpatient physical therapy and presents with a medical diagnosis of left acoustic neuroma  and  "demonstrates limitations as described in the problem list. Pt prognosis is Good. Pt will continue to benefit from skilled outpatient physical therapy to address the deficits listed in the problem list, provide pt/family education and to maximize pt's level of independence in the home and community environment.     Goals as follows:  To restore good dynamic standing balance, and improve gait to wfl without SPC      Goals:  Short Term Goals: 2 weeks   Patient compliant with initial exercises  MET     Long Term Goals: 4 weeks   1. Improve score on DGI to >/= 22/24 to demonstrate improved gait stability  2. Perform > 10 sit to stands in 30" w/o UE assist to demonstrate increased LE strength and endurance  3. Maintain  4. Strengthen balance control in everyday activities to improve fall related self-efficacy, reduce fear of falling, and increase walking speed Progressing   5. Demonstrate improved posture to decrease fall risk Progressing   6. Demonstrate independence with home exercise program to maintain gains made in therapy   Plan     Continue with established Plan of Care towards PT goals.    Therapist: Shabnam Denis, PT  7/21/2022      "

## 2022-07-26 ENCOUNTER — HOSPITAL ENCOUNTER (OUTPATIENT)
Dept: RADIOLOGY | Facility: HOSPITAL | Age: 72
Discharge: HOME | End: 2022-07-26
Attending: NEUROLOGICAL SURGERY
Payer: MEDICARE

## 2022-07-26 ENCOUNTER — OFFICE VISIT (OUTPATIENT)
Dept: NEUROSURGERY | Facility: CLINIC | Age: 72
End: 2022-07-26
Payer: MEDICARE

## 2022-07-26 VITALS
DIASTOLIC BLOOD PRESSURE: 92 MMHG | SYSTOLIC BLOOD PRESSURE: 162 MMHG | HEART RATE: 69 BPM | BODY MASS INDEX: 38.66 KG/M2 | HEIGHT: 69 IN | WEIGHT: 261 LBS | OXYGEN SATURATION: 98 %

## 2022-07-26 DIAGNOSIS — D33.3 LEFT ACOUSTIC NEUROMA: Primary | ICD-10-CM

## 2022-07-26 DIAGNOSIS — D33.3 LEFT ACOUSTIC NEUROMA: ICD-10-CM

## 2022-07-26 PROCEDURE — A9585 GADOBUTROL INJECTION: HCPCS | Performed by: NEUROLOGICAL SURGERY

## 2022-07-26 PROCEDURE — 70553 MRI BRAIN W WO CONTRAST: ICD-10-PCS | Mod: 26,,, | Performed by: RADIOLOGY

## 2022-07-26 PROCEDURE — 25500020 PHARM REV CODE 255: Performed by: NEUROLOGICAL SURGERY

## 2022-07-26 PROCEDURE — 70553 MRI BRAIN STEM W/O & W/DYE: CPT | Mod: 26,,, | Performed by: RADIOLOGY

## 2022-07-26 PROCEDURE — 99214 PR OFFICE/OUTPT VISIT, EST, LEVL IV, 30-39 MIN: ICD-10-PCS | Mod: S$PBB,,, | Performed by: NEUROLOGICAL SURGERY

## 2022-07-26 PROCEDURE — 99999 PR PBB SHADOW E&M-EST. PATIENT-LVL III: ICD-10-PCS | Mod: PBBFAC,,, | Performed by: NEUROLOGICAL SURGERY

## 2022-07-26 PROCEDURE — 99214 OFFICE O/P EST MOD 30 MIN: CPT | Mod: S$PBB,,, | Performed by: NEUROLOGICAL SURGERY

## 2022-07-26 PROCEDURE — 99999 PR PBB SHADOW E&M-EST. PATIENT-LVL III: CPT | Mod: PBBFAC,,, | Performed by: NEUROLOGICAL SURGERY

## 2022-07-26 PROCEDURE — 99213 OFFICE O/P EST LOW 20 MIN: CPT | Mod: PBBFAC,25 | Performed by: NEUROLOGICAL SURGERY

## 2022-07-26 PROCEDURE — 70553 MRI BRAIN STEM W/O & W/DYE: CPT | Mod: TC

## 2022-07-26 RX ORDER — METHYLPREDNISOLONE 4 MG/1
TABLET ORAL
Qty: 21 EACH | Refills: 0 | Status: SHIPPED | OUTPATIENT
Start: 2022-07-26 | End: 2022-08-16

## 2022-07-26 RX ORDER — GADOBUTROL 604.72 MG/ML
10 INJECTION INTRAVENOUS
Status: COMPLETED | OUTPATIENT
Start: 2022-07-26 | End: 2022-07-26

## 2022-07-26 RX ADMIN — GADOBUTROL 10 ML: 604.72 INJECTION INTRAVENOUS at 01:07

## 2022-07-26 NOTE — PROGRESS NOTES
Subjective:   I, Ameena Murray, attest that this documentation has been prepared under the direction and in the presence of David Monteiro MD.     Patient ID: Adolfo Vazquez is a 71 y.o. male     Chief Complaint: No chief complaint on file.          HPI  Mr. Adolfo Vazquez is a 71 y.o. gentleman with a history of HTN, left acoustic neuroma, s/p GSRS (14Gy to the 50% isodose line) done on 3/9/2022, who presents today for 3 month follow up with MRI brain. This is a pt who began having hearing loss to his left ear. He also began having ringing to his ear and was seen by his physician who ordered MRI showing concerns for acoustic neuroma. States he is having issues with his balance when he walks. This occurs intermittently and states he always feels that he is going to fall as he walks. The pt had audiogram done on 3/28/22. Per note, results revealed mild sensorineural hearing loss in the right ear and mild to moderately-severe sensorineural hearing loss in the left ear.  At the time of our last visit on 4/26/22, he reports of having increasing unsteadiness as he walks. He ambulates with a cane. States he is unable to walk a straight line. Denies any falls.     Today the pt reports he continues to have balance issues and gait instability. He reports a fall occurring this morning. The pt states he had one foot on the bed and fell over to the side. Notes it was a mild injury. He also endorses hearing loss to the left ear but this is not new. Patient arrived with walking cane.    Review of Systems   Constitutional: Negative for activity change, appetite change, fatigue, fever and unexpected weight change.   HENT: Positive for hearing loss (L>R). Negative for facial swelling.    Eyes: Negative.    Respiratory: Negative.    Cardiovascular: Negative.    Gastrointestinal: Negative for diarrhea, nausea and vomiting.   Endocrine: Negative.    Genitourinary: Negative.    Musculoskeletal: Positive for gait problem. Negative for  back pain, joint swelling, myalgias and neck pain.   Neurological: Negative for dizziness, seizures, weakness, numbness and headaches.   Psychiatric/Behavioral: Negative.       Past Medical History:   Diagnosis Date    Anxiety     Colon polyp     benign    Depression     Elevated PSA     High cholesterol     Hypertension     Prostate cancer        Objective:      Vitals:    07/26/22 1403   BP: (!) 162/92   Pulse: 69      Physical Exam  Constitutional:       General: He is not in acute distress.     Appearance: Normal appearance.   HENT:      Head: Normocephalic and atraumatic.   Pulmonary:      Effort: Pulmonary effort is normal.   Musculoskeletal:      Cervical back: Neck supple.   Neurological:      Mental Status: He is alert and oriented to person, place, and time.      GCS: GCS eye subscore is 4. GCS verbal subscore is 5. GCS motor subscore is 6.      Cranial Nerves: No cranial nerve deficit.      Motor: Motor function is intact.      Coordination: Coordination is intact.      Gait: Gait is intact.       IMAGING:  MRI Brain W WO Contrast (7/26/22):  Approximate 0.4 x 1.07 cm focus of enhancement of the intracanalicular portion of the left acoustic nerve complex consistent with acoustic neuroma.     I have personally reviewed the images with the pt.      I, Dr. David Monteiro, personally performed the services described in this documentation. All medical record entries made by the scribe, Ameena Murray, were at my direction and in my presence.  I have reviewed the chart and agree that the record reflects my personal performance and is accurate and complete. David Monteiro MD. 07/26/2022    Assessment:       Acoustic neuroma.     Plan:   I have personally reviewed the MRI brain with the pt which shows approximate 0.4 x 1.07 cm focus of enhancement of the intracanalicular portion of the left acoustic nerve complex consistent with acoustic neuroma.     I will prescribe the pt a Medrol dose pack.     I will  schedule the patient for 6 month follow up with MRI brain.

## 2022-07-26 NOTE — PATIENT INSTRUCTIONS
I have personally reviewed the MRI brain with the pt which shows approximate 0.4 x 1.07 cm focus of enhancement of the intracanalicular portion of the left acoustic nerve complex consistent with acoustic neuroma.     I will prescribe the pt a Medrol dose pack.    I will schedule the patient for 6 month follow up with MRI brain.

## 2022-07-27 ENCOUNTER — CLINICAL SUPPORT (OUTPATIENT)
Dept: REHABILITATION | Facility: HOSPITAL | Age: 72
End: 2022-07-27
Payer: MEDICARE

## 2022-07-27 DIAGNOSIS — R29.818 DIFFICULTY BALANCING: Primary | ICD-10-CM

## 2022-07-27 PROCEDURE — 97110 THERAPEUTIC EXERCISES: CPT

## 2022-07-27 NOTE — PROGRESS NOTES
Physical Therapy Daily Note     Name: Adolfo Vazquez  Clinic Number: [id number]  Diagnosis:   Encounter Diagnosis   Name Primary?    Difficulty balancing Yes     Physician: David Monteiro MD  Precautions: falls  Visit #: 14 of 20  PTA Visit #: 0  Time In: 930  Time Out: 1015    Subjective     Pt reports:states his balance is a little better  Pain Scale: Adolfo rates pain on a scale of 0-10 to be 1 currently.    Objective     Adolfo received individual therapeutic exercises to develop endurance, core stabilization and balance  for 38 minutes including:  nustep x 8  Leg press 70 # x 30  Balance beam. Forward heel to toe,// bar  bosu ball stabilization   Sideways amb in // bars on balance beam   SLS on airex martha LOWER EXTREMITY   Tandem BLE stance on airex beam  Toe raises at steps,  Min assisted HHA side steps and backwards walking  Steps  Forward  Steps sideways   GAIT training with  spc on level and unlevel surfaces sba  Grounders, GTB  Putting GTB  Closed chain TKE 4 in step x 20 each   reebok step overs martha // bars   Bridges with SB  LTR SB  SAQ on bolster 8# x 30 each       The patient received the following supervised modalities after being cleared for contradictions:   Written Home Exercises Provided: for balance activities   Pt demo good understanding of the education provided. Adolfo demonstrated good return demonstration of activities.     Education provided re:  Adolfo verbalized good understanding of education provided.   No spiritual or educational barriers to learning provided    Assessment     Patient tolerated skilled services to improve strength, balance and gait deficits  He remains at fall risk with activities   Balance is definitely improving as evidenced by dynamic balance exercises   ind gait without SPC     This is a 71 y.o. male referred to outpatient physical therapy and presents with a medical diagnosis of left acoustic neuroma  and  "demonstrates limitations as described in the problem list. Pt prognosis is Good. Pt will continue to benefit from skilled outpatient physical therapy to address the deficits listed in the problem list, provide pt/family education and to maximize pt's level of independence in the home and community environment.     Goals as follows:  To restore good dynamic standing balance, and improve gait to wfl without SPC      Goals:  Short Term Goals: 2 weeks   Patient compliant with initial exercises  MET     Long Term Goals: 4 weeks   1. Improve score on DGI to >/= 22/24 to demonstrate improved gait stability  2. Perform > 10 sit to stands in 30" w/o UE assist to demonstrate increased LE strength and endurance  3. Maintain  4. Strengthen balance control in everyday activities to improve fall related self-efficacy, reduce fear of falling, and increase walking speed Progressing   5. Demonstrate improved posture to decrease fall risk Progressing   6. Demonstrate independence with home exercise program to maintain gains made in therapy   Plan     Continue with established Plan of Care towards PT goals.    Therapist: Shabnam Denis, PT  7/27/2022      "

## 2022-08-01 ENCOUNTER — CLINICAL SUPPORT (OUTPATIENT)
Dept: REHABILITATION | Facility: HOSPITAL | Age: 72
End: 2022-08-01
Payer: MEDICARE

## 2022-08-01 DIAGNOSIS — R26.2 DIFFICULTY WALKING: Primary | ICD-10-CM

## 2022-08-01 PROCEDURE — 97110 THERAPEUTIC EXERCISES: CPT

## 2022-08-01 NOTE — PROGRESS NOTES
Physical Therapy Daily Note     Name: Adolfo Vazquez  Clinic Number: [id number]  Diagnosis:   No diagnosis found.  Physician: David Monteiro MD  Precautions: falls  Visit #: 15 of 20  PTA Visit #: 0  Time In: 1100  Time Out: 1140    Subjective     Pt reports:states his balance is a little better  Pain Scale: Adolfo rates pain on a scale of 0-10 to be 1 currently.    Objective     Adolfo received individual therapeutic exercises to develop endurance, core stabilization and balance  for 38 minutes including:  nustep x 8  Leg press 70 # x 30  Balance beam. Forward heel to toe,// bar  bosu ball stabilization   Sideways amb in // bars on balance beam   SLS on airex martha LOWER EXTREMITY   Tandem BLE stance on airex beam  Toe raises at steps,  Min assisted HHA side steps and backwards walking  Steps  Forward  Steps sideways   GAIT training with  spc on level and unlevel surfaces sba  Grounders, GTB  Putting GTB  Closed chain TKE 4 in step x 20 each   reebok step overs martha // bars   Bridges with SB  LTR SB  SAQ on bolster 8# x 30 each       The patient received the following supervised modalities after being cleared for contradictions:   Written Home Exercises Provided: for balance activities   Pt demo good understanding of the education provided. Adolfo demonstrated good return demonstration of activities.     Education provided re:  Adolfo verbalized good understanding of education provided.   No spiritual or educational barriers to learning provided    Assessment     Patient tolerated skilled services to improve strength, balance and gait deficits  He remains at fall risk with activities   Balance is definitely improving as evidenced by dynamic balance exercises   ind gait without SPC     This is a 71 y.o. male referred to outpatient physical therapy and presents with a medical diagnosis of left acoustic neuroma  and demonstrates limitations as described in the problem  "list. Pt prognosis is Good. Pt will continue to benefit from skilled outpatient physical therapy to address the deficits listed in the problem list, provide pt/family education and to maximize pt's level of independence in the home and community environment.     Goals as follows:  To restore good dynamic standing balance, and improve gait to wfl without SPC      Goals:  Short Term Goals: 2 weeks   Patient compliant with initial exercises  MET     Long Term Goals: 4 weeks   1. Improve score on DGI to >/= 22/24 to demonstrate improved gait stability  2. Perform > 10 sit to stands in 30" w/o UE assist to demonstrate increased LE strength and endurance  3. Maintain  4. Strengthen balance control in everyday activities to improve fall related self-efficacy, reduce fear of falling, and increase walking speed Progressing   5. Demonstrate improved posture to decrease fall risk Progressing   6. Demonstrate independence with home exercise program to maintain gains made in therapy   Plan     Continue with established Plan of Care towards PT goals.    Therapist: Shabnam Denis, PT  8/1/2022      "

## 2022-08-09 ENCOUNTER — CLINICAL SUPPORT (OUTPATIENT)
Dept: REHABILITATION | Facility: HOSPITAL | Age: 72
End: 2022-08-09
Payer: MEDICARE

## 2022-08-09 DIAGNOSIS — R26.2 DIFFICULTY WALKING DOWN STAIRS: Primary | ICD-10-CM

## 2022-08-09 PROCEDURE — 97110 THERAPEUTIC EXERCISES: CPT

## 2022-08-09 NOTE — PROGRESS NOTES
Physical Therapy Daily Note     Name: Adolfo Vazquez  Clinic Number: [id number]  Diagnosis:   Encounter Diagnosis   Name Primary?    Difficulty walking down stairs Yes     Physician: David Monteiro MD  Precautions: falls  Visit #: 16 of 20  PTA Visit #: 0  Time In: 1100  Time Out: 1140    Subjective     Pt reports:states his balance is a little better  Pain Scale: Adolfo rates pain on a scale of 0-10 to be 1 currently.    Objective     Adolfo received individual therapeutic exercises to develop endurance, core stabilization and balance  for 38 minutes including:  nustep x 8  Leg press 70 # x 30  Balance beam. Forward heel to toe,// bar  bosu ball stabilization   Sideways amb in // bars on balance beam   SLS on airex martha LOWER EXTREMITY   Tandem BLE stance on airex beam  Toe raises at steps,  Steps  Forward  Steps sideways   Grounders, GTB  Putting GTB  Closed chain TKE 4 in step x 20 each   reebok step overs martha // bars   Bridges with SB  LTR SB  SAQ on bolster 8# x 30 each       The patient received the following supervised modalities after being cleared for contradictions:   Written Home Exercises Provided: for balance activities   Pt demo good understanding of the education provided. Adolfo demonstrated good return demonstration of activities.     Education provided re:  Adolfo verbalized good understanding of education provided.   No spiritual or educational barriers to learning provided    Assessment     Patient tolerated skilled services to improve strength, balance and gait deficits  He remains at fall risk with activities   Balance is definitely improving as evidenced by dynamic balance exercises   ind gait without SPC     This is a 71 y.o. male referred to outpatient physical therapy and presents with a medical diagnosis of left acoustic neuroma  and demonstrates limitations as described in the problem list. Pt prognosis is Good. Pt will continue to  "benefit from skilled outpatient physical therapy to address the deficits listed in the problem list, provide pt/family education and to maximize pt's level of independence in the home and community environment.     Goals as follows:  To restore good dynamic standing balance, and improve gait to wfl without SPC      Goals:  Short Term Goals: 2 weeks   Patient compliant with initial exercises  MET     Long Term Goals: 4 weeks   1. Improve score on DGI to >/= 22/24 to demonstrate improved gait stability  2. Perform > 10 sit to stands in 30" w/o UE assist to demonstrate increased LE strength and endurance  3. Maintain  4. Strengthen balance control in everyday activities to improve fall related self-efficacy, reduce fear of falling, and increase walking speed Progressing   5. Demonstrate improved posture to decrease fall risk Progressing   6. Demonstrate independence with home exercise program to maintain gains made in therapy   Plan     Continue with established Plan of Care towards PT goals.    Therapist: Shabnam Denis, PT  8/9/2022      "

## 2022-08-11 ENCOUNTER — CLINICAL SUPPORT (OUTPATIENT)
Dept: REHABILITATION | Facility: HOSPITAL | Age: 72
End: 2022-08-11
Payer: MEDICARE

## 2022-08-11 DIAGNOSIS — R29.818 DIFFICULTY BALANCING: Primary | ICD-10-CM

## 2022-08-11 PROCEDURE — 97110 THERAPEUTIC EXERCISES: CPT

## 2022-08-11 NOTE — PROGRESS NOTES
Physical Therapy Daily Note     Name: Adolfo Vazquez  Clinic Number: [id number]  Diagnosis:   Encounter Diagnosis   Name Primary?    Difficulty balancing Yes     Physician: David Monteiro MD  Precautions: falls  Visit #: 16 of 20  PTA Visit #: 0  Time In: 1100  Time Out: 1130    Subjective     Pt reports:states his balance is a little better  Pain Scale: Adolfo rates pain on a scale of 0-10 to be 1 currently.    Objective     Adolfo received individual therapeutic exercises to develop endurance, core stabilization and balance  for 30 minutes including:  nustep x 8  Leg press 70 # x 30  Balance beam. Forward heel to toe,// bar  bosu ball stabilization   Sideways amb in // bars on balance beam   SLS on airex martha LOWER EXTREMITY   Tandem BLE stance on airex beam  Toe raises at steps,  Steps  Forward  Steps sideways   Grounders, GTB  Putting GTB  Closed chain TKE 4 in step x 20 each   reebok step overs martha // bars   Bridges with SB  LTR SB  SAQ on bolster 8# x 30 each       The patient received the following supervised modalities after being cleared for contradictions:   Written Home Exercises Provided: for balance activities   Pt demo good understanding of the education provided. Adolfo demonstrated good return demonstration of activities.     Education provided re:  Adolfo verbalized good understanding of education provided.   No spiritual or educational barriers to learning provided    Assessment     Patient tolerated skilled services to improve strength, balance and gait deficits  He remains at fall risk with activities   Balance is definitely improving as evidenced by dynamic balance exercises   ind gait without SPC     This is a 71 y.o. male referred to outpatient physical therapy and presents with a medical diagnosis of left acoustic neuroma  and demonstrates limitations as described in the problem list. Pt prognosis is Good. Pt will continue to benefit from  "skilled outpatient physical therapy to address the deficits listed in the problem list, provide pt/family education and to maximize pt's level of independence in the home and community environment.     Goals as follows:  To restore good dynamic standing balance, and improve gait to wfl without SPC      Goals:  Short Term Goals: 2 weeks   Patient compliant with initial exercises  MET     Long Term Goals: 4 weeks   1. Improve score on DGI to >/= 22/24 to demonstrate improved gait stability  2. Perform > 10 sit to stands in 30" w/o UE assist to demonstrate increased LE strength and endurance  3. Maintain  4. Strengthen balance control in everyday activities to improve fall related self-efficacy, reduce fear of falling, and increase walking speed Progressing   5. Demonstrate improved posture to decrease fall risk Progressing   6. Demonstrate independence with home exercise program to maintain gains made in therapy   Plan     Discharge from PT  Pt has reached max potential   Therapist: Shabnam Denis, PT  8/11/2022      "

## 2022-08-19 ENCOUNTER — PATIENT MESSAGE (OUTPATIENT)
Dept: NEUROSURGERY | Facility: CLINIC | Age: 72
End: 2022-08-19
Payer: MEDICARE

## 2022-08-25 ENCOUNTER — TELEPHONE (OUTPATIENT)
Dept: NEUROSURGERY | Facility: CLINIC | Age: 72
End: 2022-08-25
Payer: MEDICARE

## 2022-08-25 DIAGNOSIS — R26.81 GAIT INSTABILITY: Primary | ICD-10-CM

## 2022-08-29 ENCOUNTER — TELEPHONE (OUTPATIENT)
Dept: ENDOSCOPY | Facility: HOSPITAL | Age: 72
End: 2022-08-29
Payer: MEDICARE

## 2022-08-29 DIAGNOSIS — Z12.11 SPECIAL SCREENING FOR MALIGNANT NEOPLASMS, COLON: Primary | ICD-10-CM

## 2022-08-29 RX ORDER — POLYETHYLENE GLYCOL 3350, SODIUM SULFATE ANHYDROUS, SODIUM BICARBONATE, SODIUM CHLORIDE, POTASSIUM CHLORIDE 236; 22.74; 6.74; 5.86; 2.97 G/4L; G/4L; G/4L; G/4L; G/4L
4 POWDER, FOR SOLUTION ORAL ONCE
Qty: 4000 ML | Refills: 0 | Status: SHIPPED | OUTPATIENT
Start: 2022-08-29 | End: 2022-08-29

## 2022-09-12 ENCOUNTER — CLINICAL SUPPORT (OUTPATIENT)
Dept: REHABILITATION | Facility: HOSPITAL | Age: 72
End: 2022-09-12
Attending: NEUROLOGICAL SURGERY
Payer: MEDICARE

## 2022-09-12 DIAGNOSIS — R29.818 DIFFICULTY BALANCING: Primary | ICD-10-CM

## 2022-09-12 DIAGNOSIS — R26.81 GAIT INSTABILITY: ICD-10-CM

## 2022-09-12 PROCEDURE — 97110 THERAPEUTIC EXERCISES: CPT

## 2022-09-12 PROCEDURE — 97116 GAIT TRAINING THERAPY: CPT

## 2022-09-12 NOTE — PROGRESS NOTES
Physical Therapy Daily Note     Name: Adolfo Vazquez  Clinic Number: [id number]  Diagnosis:   Encounter Diagnoses   Name Primary?    Gait instability     Difficulty balancing Yes     Physician: David Monteiro MD  Precautions: falls  Visit #: 1 of 12  PTA Visit #: 0  Time In: 1015  Time Out: 1100    Subjective     Pt reports:pt. States balance is the same, a little unsteady, had a fall last week  Pain Scale: Adolfo rates pain on a scale of 0-10 to be 5 currently.    Objective     Adolfo received individual therapeutic exercises to develop endurance, core stabilization and balance  for 30 minutes including: some activities not performed  nustep x 8  Leg press 70 # x 30  Balance beam. Forward heel to toe,// bar  bosu ball stabilization   Sideways amb in // bars on balance beam   SLS on airex martha LOWER EXTREMITY   Tandem BLE stance on airex beam  Toe raises at steps,  Steps  Forward  Steps sideways   Grounders, GTB  Putting GTB  Closed chain TKE 4 in step x 20 each   reebok step overs martha // bars   Bridges with SB  LTR SB  SAQ on bolster 8# x 30 each     GAIT  Gait 100'x2 with head nods  Gait 100'x2 with head turns    Theraputic activities       The patient received the following supervised modalities after being cleared for contradictions:   Written Home Exercises Provided: for balance activities   Pt demo good understanding of the education provided. Adolfo demonstrated good return demonstration of activities.     Education provided re:  Adolfo verbalized good understanding of education provided.   No spiritual or educational barriers to learning provided    Assessment     Patient tolerated skilled services to improve strength, balance and gait deficits  He remains at fall risk with activities   Balance is definitely improving as evidenced by dynamic balance exercises   ind gait without SPC     This is a 71 y.o. male referred to outpatient physical therapy and  "presents with a medical diagnosis of left acoustic neuroma  and demonstrates limitations as described in the problem list. Pt prognosis is Good. Pt will continue to benefit from skilled outpatient physical therapy to address the deficits listed in the problem list, provide pt/family education and to maximize pt's level of independence in the home and community environment.     Goals as follows:  To restore good dynamic standing balance, and improve gait to wfl without SPC      Goals:  Short Term Goals: 2 weeks   Patient compliant with initial exercises  MET     Long Term Goals: 4 weeks   1. Improve score on DGI to >/= 22/24 to demonstrate improved gait stability  2. Perform > 10 sit to stands in 30" w/o UE assist to demonstrate increased LE strength and endurance  3. Maintain  4. Strengthen balance control in everyday activities to improve fall related self-efficacy, reduce fear of falling, and increase walking speed Progressing   5. Demonstrate improved posture to decrease fall risk Progressing   6. Demonstrate independence with home exercise program to maintain gains made in therapy   Plan     Discharge from PT  Pt has reached max potential   Therapist: Shabnam Denis, PT  9/12/2022        "

## 2022-09-21 ENCOUNTER — ANESTHESIA (OUTPATIENT)
Dept: ENDOSCOPY | Facility: HOSPITAL | Age: 72
End: 2022-09-21
Payer: MEDICARE

## 2022-09-21 ENCOUNTER — ANESTHESIA EVENT (OUTPATIENT)
Dept: ENDOSCOPY | Facility: HOSPITAL | Age: 72
End: 2022-09-21
Payer: MEDICARE

## 2022-09-21 ENCOUNTER — HOSPITAL ENCOUNTER (OUTPATIENT)
Facility: HOSPITAL | Age: 72
Discharge: HOME | End: 2022-09-21
Attending: COLON & RECTAL SURGERY | Admitting: COLON & RECTAL SURGERY
Payer: MEDICARE

## 2022-09-21 VITALS
SYSTOLIC BLOOD PRESSURE: 160 MMHG | DIASTOLIC BLOOD PRESSURE: 80 MMHG | OXYGEN SATURATION: 99 % | BODY MASS INDEX: 37.22 KG/M2 | HEART RATE: 70 BPM | TEMPERATURE: 98 F | HEIGHT: 70 IN | RESPIRATION RATE: 16 BRPM | WEIGHT: 260 LBS

## 2022-09-21 DIAGNOSIS — Z12.11 ENCOUNTER FOR SCREENING COLONOSCOPY: ICD-10-CM

## 2022-09-21 PROCEDURE — 45385 COLONOSCOPY W/LESION REMOVAL: CPT | Mod: PT | Performed by: COLON & RECTAL SURGERY

## 2022-09-21 PROCEDURE — 88305 TISSUE EXAM BY PATHOLOGIST: CPT | Mod: 26,,, | Performed by: PATHOLOGY

## 2022-09-21 PROCEDURE — 88305 TISSUE EXAM BY PATHOLOGIST: CPT | Performed by: PATHOLOGY

## 2022-09-21 PROCEDURE — 63600175 PHARM REV CODE 636 W HCPCS: Performed by: NURSE ANESTHETIST, CERTIFIED REGISTERED

## 2022-09-21 PROCEDURE — 27201012 HC FORCEPS, HOT/COLD, DISP: Performed by: STUDENT IN AN ORGANIZED HEALTH CARE EDUCATION/TRAINING PROGRAM

## 2022-09-21 PROCEDURE — E9220 PRA ENDO ANESTHESIA: HCPCS | Mod: PT,,, | Performed by: NURSE ANESTHETIST, CERTIFIED REGISTERED

## 2022-09-21 PROCEDURE — 25000003 PHARM REV CODE 250: Performed by: NURSE ANESTHETIST, CERTIFIED REGISTERED

## 2022-09-21 PROCEDURE — 45385 PR COLONOSCOPY,REMV LESN,SNARE: ICD-10-PCS | Mod: PT,,, | Performed by: COLON & RECTAL SURGERY

## 2022-09-21 PROCEDURE — 37000008 HC ANESTHESIA 1ST 15 MINUTES: Performed by: STUDENT IN AN ORGANIZED HEALTH CARE EDUCATION/TRAINING PROGRAM

## 2022-09-21 PROCEDURE — 27201089 HC SNARE, DISP (ANY): Performed by: STUDENT IN AN ORGANIZED HEALTH CARE EDUCATION/TRAINING PROGRAM

## 2022-09-21 PROCEDURE — 37000009 HC ANESTHESIA EA ADD 15 MINS: Performed by: STUDENT IN AN ORGANIZED HEALTH CARE EDUCATION/TRAINING PROGRAM

## 2022-09-21 PROCEDURE — 45385 COLONOSCOPY W/LESION REMOVAL: CPT | Mod: PT,,, | Performed by: COLON & RECTAL SURGERY

## 2022-09-21 PROCEDURE — E9220 PRA ENDO ANESTHESIA: ICD-10-PCS | Mod: PT,,, | Performed by: NURSE ANESTHETIST, CERTIFIED REGISTERED

## 2022-09-21 PROCEDURE — 88305 TISSUE EXAM BY PATHOLOGIST: ICD-10-PCS | Mod: 26,,, | Performed by: PATHOLOGY

## 2022-09-21 PROCEDURE — 25000003 PHARM REV CODE 250: Performed by: COLON & RECTAL SURGERY

## 2022-09-21 RX ORDER — SODIUM CHLORIDE 9 MG/ML
INJECTION, SOLUTION INTRAVENOUS CONTINUOUS
Status: DISCONTINUED | OUTPATIENT
Start: 2022-09-21 | End: 2022-09-21 | Stop reason: HOSPADM

## 2022-09-21 RX ORDER — PROPOFOL 10 MG/ML
VIAL (ML) INTRAVENOUS CONTINUOUS PRN
Status: DISCONTINUED | OUTPATIENT
Start: 2022-09-21 | End: 2022-09-21

## 2022-09-21 RX ORDER — PROPOFOL 10 MG/ML
VIAL (ML) INTRAVENOUS
Status: DISCONTINUED | OUTPATIENT
Start: 2022-09-21 | End: 2022-09-21

## 2022-09-21 RX ORDER — LIDOCAINE HYDROCHLORIDE 20 MG/ML
INJECTION INTRAVENOUS
Status: DISCONTINUED | OUTPATIENT
Start: 2022-09-21 | End: 2022-09-21

## 2022-09-21 RX ADMIN — LIDOCAINE HYDROCHLORIDE 50 MG: 20 INJECTION INTRAVENOUS at 12:09

## 2022-09-21 RX ADMIN — PROPOFOL 100 MCG/KG/MIN: 10 INJECTION, EMULSION INTRAVENOUS at 12:09

## 2022-09-21 RX ADMIN — PROPOFOL 20 MG: 10 INJECTION, EMULSION INTRAVENOUS at 12:09

## 2022-09-21 RX ADMIN — SODIUM CHLORIDE: 0.9 INJECTION, SOLUTION INTRAVENOUS at 11:09

## 2022-09-21 RX ADMIN — PROPOFOL 60 MG: 10 INJECTION, EMULSION INTRAVENOUS at 12:09

## 2022-09-21 NOTE — ANESTHESIA PREPROCEDURE EVALUATION
09/21/2022  Adolfo Vazquez is a 71 y.o., male.    Active Problem List with Overview Notes    Diagnosis Date Noted    Difficulty walking down stairs 06/07/2022    Difficulty balancing 06/07/2022    MAU (obstructive sleep apnea)     Other insomnia not due to a substance or known physiological condition     Screening for colon cancer 01/24/2019    HTN (hypertension) 07/29/2013    High cholesterol 07/29/2013    Prostate cancer 01/14/2013    Erectile dysfunction 01/14/2013     Past Surgical History:   Procedure Laterality Date    COLON SURGERY      COLONOSCOPY N/A 1/24/2019    Procedure: COLONOSCOPY;  Surgeon: Clifton Whiting MD;  Location: [facility] ([address] [place] [address]);  Service: Endoscopy;  Laterality: N/A;    PROSTATE SURGERY      SKIN CANCER EXCISION       Results for orders placed or performed during the hospital encounter of 06/24/21   EKG 12-lead    Collection Time: 06/24/21  9:52 AM    Narrative    Test Reason : Z00.00,    Vent. Rate : 067 BPM     Atrial Rate : 067 BPM     P-R Int : 180 ms          QRS Dur : 090 ms      QT Int : 398 ms       P-R-T Axes : 049 027 083 degrees     QTc Int : 420 ms    Normal sinus rhythm  Normal ECG  When compared with ECG of 01-MAY-2019 09:13,  No significant change was found  Confirmed by Nancy Barbosa MD ([id number]) on 6/24/2021 2:49:48 PM    Referred By: RICHARDSON GONZALEZ           Confirmed By:Nancy Barbosa MD     2018 Exercise stress echo    CONCLUSIONS     1 - Normal left ventricular systolic function (EF 55-60%).     2 - Indeterminate LV diastolic function.     3 - Normal right ventricular systolic function .     4 - The estimated PA systolic pressure is greater than 26 mmHg.     5 - Mild aortic regurgitation.     6 - Mild mitral regurgitation.     7 - Trivial tricuspid regurgitation.     8 - Mild left atrial enlargement.     9 - No wall motion abnormalities.     No  evidence of stress induced myocardial ischemia. Sensitivity is impaired due to failure to reach target heart rate.      Pre-op Assessment    I have reviewed the Patient Summary Reports.    I have reviewed the NPO Status.   I have reviewed the Medications.     Review of Systems  Anesthesia Hx:  No problems with previous Anesthesia    Social:  Non-Smoker, Social Alcohol Use    Hematology/Oncology:  Hematology Normal   Oncology Normal     EENT/Dental:EENT/Dental Normal   Cardiovascular:   Hypertension  Denies Angina.  Denies STARKS. ECG has been reviewed.    Pulmonary:   Sleep Apnea    Renal/:  Renal/ Normal     Hepatic/GI:  Hepatic/GI Normal    Musculoskeletal:  Musculoskeletal Normal    Neurological:  Neurology Normal    Endocrine:  Endocrine Normal    Dermatological:  Skin Normal    Psych:   Psychiatric History          Physical Exam  General: Well nourished, Cooperative, Alert and Oriented    Airway:  Mallampati: II   Mouth Opening: Normal  TM Distance: Normal  Tongue: Normal  Neck ROM: Normal ROM    Dental:  Intact    Chest/Lungs:  Clear to auscultation, Normal Respiratory Rate    Heart:  Rate: Normal  Rhythm: Regular Rhythm        Anesthesia Plan  Type of Anesthesia, risks & benefits discussed:    Anesthesia Type: Gen Natural Airway  Intra-op Monitoring Plan: Standard ASA Monitors  Post Op Pain Control Plan: multimodal analgesia  Induction:  IV  Informed Consent: Informed consent signed with the Patient and all parties understand the risks and agree with anesthesia plan.  All questions answered.   ASA Score: 3  Day of Surgery Review of History & Physical: H&P Update referred to the surgeon/provider.    Ready For Surgery From Anesthesia Perspective.     .

## 2022-09-21 NOTE — H&P
"[facility]  Colorectal Surgery  History & Physical    Patient Name: Adolfo Vazquez  MRN: [id number]  Admission Date: 9/21/2022  Attending Physician: Clifton Whiting MD   Primary Care Provider: Ronan Paul MD    COLONOSCOPY HISTORY AND PHYSICAL EXAM    Procedure : Colonoscopy      INDICATIONS: asymptomatic screening exam and personal history of colon polyps    Family Hx of CRC: maternal uncle    Last Colonoscopy:  2019  Findings: Findings:        scope to cecum no large findings ,saw glimpse of 2mm polyp behind a        fold and could not find again despite 7 more passes..will rescope at        3 years for safety   Impression:             - No specimens collected.   Recommendation:         - Repeat colonoscopy in 3 years for surveillance based on pathology results.   Sedation Problems: NO  Fam Hx of Sedation Problems: NO     Past Medical History:   Diagnosis Date    Anxiety     Colon polyp     benign    Depression     Elevated PSA     High cholesterol     Hypertension     Prostate cancer      Family History   Problem Relation Age of Onset    Cancer Father         esophageal CA    Emphysema Mother     Coronary artery disease Maternal Grandfather     Cancer Maternal Uncle      Social History     Socioeconomic History    Marital status:    Tobacco Use    Smoking status: Never    Smokeless tobacco: Never   Substance and Sexual Activity    Alcohol use: Yes     Alcohol/week: 24.0 standard drinks     Types: 20 Glasses of wine, 4 Cans of beer per week     Comment: social    Drug use: No    Sexual activity: Yes     Partners: Female       Review of Systems - Negative except   Respiratory ROS: no dyspnea  Cardiovascular ROS: no exertional chest pain  Gastrointestinal ROS: NO abdominal discomfort,  NO rectal bleeding  Musculoskeletal ROS: no muscular pain  Neurological ROS: no recent stroke    Physical Exam:  BP (!) 171/77   Pulse 65   Temp 97.9 °F (36.6 °C) (Temporal)   Resp 18   Ht 5' 10" " (1.778 m)   Wt 117.9 kg (260 lb)   SpO2 99%   BMI 37.31 kg/m²   General: no distress  Head: normocephalic  Mallampati Score   Neck: supple, symmetrical, trachea midline  Lungs:  normal respiratory effort  Heart: regular rate and rhythm  Abdomen: soft, non-tender non-distented; bowel sounds normal; no masses,  no organomegaly  Extremities: no cyanosis or edema, or clubbing    ASA:  III    PLAN  COLONOSCOPY.    SedationPlan :MAC    The details of the procedure, the possible need for biopsy or polypectomy and the potential risks including bleeding, perforation, missed polyps were discussed in detail.    Bharat Woodson MD [facility]  Colorectal Surgery        BHARAT WOODSON MD  Colorectal Surgery  [facility]

## 2022-09-21 NOTE — PROVATION PATIENT INSTRUCTIONS
Discharge Summary/Instructions after an Endoscopic Procedure  Patient Name: Adolfo Vazquez  Patient MRN: [id number]  Patient YOB: 1950  Wednesday, September 21, 2022  Clifton Whiting MD  Dear patient,  As a result of recent federal legislation (The Federal Cures Act), you may   receive lab or pathology results from your procedure in your [facility]   account before your physician is able to contact you. Your physician or   their representative will relay the results to you with their   recommendations at their soonest availability.  Thank you,  RESTRICTIONS:  During your procedure today, you received medications for sedation.  These   medications may affect your judgment, balance and coordination.  Therefore,   for 24 hours, you have the following restrictions:   - DO NOT drive a car, operate machinery, make legal/financial decisions,   sign important papers or drink alcohol.    ACTIVITY:  Today: no heavy lifting, straining or running due to procedural   sedation/anesthesia.  The following day: return to full activity including work.  DIET:  Eat and drink normally unless instructed otherwise.     TREATMENT FOR COMMON SIDE EFFECTS:  - Mild abdominal pain, nausea, belching, bloating or excessive gas:  rest,   eat lightly and use a heating pad.  - Sore Throat: treat with throat lozenges and/or gargle with warm salt   water.  - Because air was used during the procedure, expelling large amounts of air   from your rectum or belching is normal.  - If a bowel prep was taken, you may not have a bowel movement for 1-3 days.    This is normal.  SYMPTOMS TO WATCH FOR AND REPORT TO YOUR PHYSICIAN:  1. Abdominal pain or bloating, other than gas cramps.  2. Chest pain.  3. Back pain.  4. Signs of infection such as: chills or fever occurring within 24 hours   after the procedure.  5. Rectal bleeding, which would show as bright red, maroon, or black stools.   (A tablespoon of blood from the rectum is not serious, especially  if   hemorrhoids are present.)  6. Vomiting.  7. Weakness or dizziness.  GO DIRECTLY TO THE NEAREST EMERGENCY ROOM IF YOU HAVE ANY OF THE FOLLOWING:      Difficulty breathing              Chills and/or fever over 101 F   Persistent vomiting and/or vomiting blood   Severe abdominal pain   Severe chest pain   Black, tarry stools   Bleeding- more than one tablespoon   Any other symptom or condition that you feel may need urgent attention  Your doctor recommends these additional instructions:  If any biopsies were taken, your doctors clinic will contact you in 1 to 2   weeks with any results.  - Discharge patient to home (ambulatory).   - Resume previous diet indefinitely.   - Continue present medications.   - Repeat colonoscopy date to be determined after pending pathology results   are reviewed for surveillance based on pathology results.  For questions, problems or results please call your physician - Clifton Whiting MD at Work:  [phone].  [facility], EMERGENCY ROOM PHONE NUMBER: [phone]  IF A COMPLICATION OR EMERGENCY SITUATION ARISES AND YOU ARE UNABLE TO REACH   YOUR PHYSICIAN - GO DIRECTLY TO THE EMERGENCY ROOM.  Clifton Whiting MD  9/21/2022 1:07:03 PM  This report has been verified and signed electronically.  Dear patient,  As a result of recent federal legislation (The Federal Cures Act), you may   receive lab or pathology results from your procedure in your [facility]   account before your physician is able to contact you. Your physician or   their representative will relay the results to you with their   recommendations at their soonest availability.  Thank you,  PROVATION

## 2022-09-21 NOTE — TRANSFER OF CARE
"Anesthesia Transfer of Care Note    Patient: Adolfo Vazquez    Procedure(s) Performed: Procedure(s) (LRB):  COLONOSCOPY (N/A)    Patient location: GI    Anesthesia Type: general    Transport from OR: Transported from OR on room air with adequate spontaneous ventilation    Post pain: adequate analgesia    Post assessment: no apparent anesthetic complications and tolerated procedure well    Post vital signs: stable    Level of consciousness: awake, alert and oriented    Nausea/Vomiting: no nausea/vomiting    Complications: none    Transfer of care protocol was followed      Last vitals:   Visit Vitals  BP (!) 148/75   Pulse 80   Temp 36.6 °C (97.9 °F) (Temporal)   Resp 16   Ht 5' 10" (1.778 m)   Wt 117.9 kg (260 lb)   SpO2 99%   BMI 37.31 kg/m²     "

## 2022-09-22 NOTE — ANESTHESIA POSTPROCEDURE EVALUATION
Anesthesia Post Evaluation    Patient: Adolfo Vazquez    Procedure(s) Performed: Procedure(s) (LRB):  COLONOSCOPY (N/A)    Final Anesthesia Type: general      Patient location during evaluation: PACU  Patient participation: Yes- Able to Participate  Level of consciousness: awake and alert and oriented  Pain management: adequate  Airway patency: patent    PONV status at discharge: No PONV  Anesthetic complications: no      Cardiovascular status: blood pressure returned to baseline and hemodynamically stable  Respiratory status: unassisted  Hydration status: euvolemic  Follow-up not needed.          Vitals Value Taken Time   /80 09/21/22 1337   Temp 36.5 °C (97.7 °F) 09/21/22 1309   Pulse 70 09/21/22 1337   Resp 16 09/21/22 1337   SpO2 99 % 09/21/22 1337         Event Time   Out of Recovery 13:38:42         Pain/Humaira Score: Humaira Score: 10 (9/21/2022  1:10 PM)

## 2022-09-26 LAB
FINAL PATHOLOGIC DIAGNOSIS: NORMAL
GROSS: NORMAL
Lab: NORMAL

## 2022-09-27 ENCOUNTER — LAB VISIT (OUTPATIENT)
Dept: LAB | Facility: HOSPITAL | Age: 72
End: 2022-09-27
Attending: INTERNAL MEDICINE
Payer: MEDICARE

## 2022-09-27 DIAGNOSIS — M10.9 ACUTE GOUT OF LEFT FOOT, UNSPECIFIED CAUSE: ICD-10-CM

## 2022-09-27 LAB
BASOPHILS # BLD AUTO: 0.05 K/UL (ref 0–0.2)
BASOPHILS NFR BLD: 0.6 % (ref 0–1.9)
DIFFERENTIAL METHOD: ABNORMAL
EOSINOPHIL # BLD AUTO: 0.2 K/UL (ref 0–0.5)
EOSINOPHIL NFR BLD: 2.2 % (ref 0–8)
ERYTHROCYTE [DISTWIDTH] IN BLOOD BY AUTOMATED COUNT: 12.6 % (ref 11.5–14.5)
ERYTHROCYTE [SEDIMENTATION RATE] IN BLOOD BY WESTERGREN METHOD: 18 MM/HR (ref 0–10)
HCT VFR BLD AUTO: 43.5 % (ref 40–54)
HGB BLD-MCNC: 15 G/DL (ref 14–18)
IMM GRANULOCYTES # BLD AUTO: 0.04 K/UL (ref 0–0.04)
IMM GRANULOCYTES NFR BLD AUTO: 0.5 % (ref 0–0.5)
LYMPHOCYTES # BLD AUTO: 1.5 K/UL (ref 1–4.8)
LYMPHOCYTES NFR BLD: 17.5 % (ref 18–48)
MCH RBC QN AUTO: 32 PG (ref 27–31)
MCHC RBC AUTO-ENTMCNC: 34.5 G/DL (ref 32–36)
MCV RBC AUTO: 93 FL (ref 82–98)
MONOCYTES # BLD AUTO: 1 K/UL (ref 0.3–1)
MONOCYTES NFR BLD: 11.2 % (ref 4–15)
NEUTROPHILS # BLD AUTO: 6 K/UL (ref 1.8–7.7)
NEUTROPHILS NFR BLD: 68 % (ref 38–73)
NRBC BLD-RTO: 0 /100 WBC
PLATELET # BLD AUTO: 271 K/UL (ref 150–450)
PMV BLD AUTO: 9.5 FL (ref 9.2–12.9)
RBC # BLD AUTO: 4.69 M/UL (ref 4.6–6.2)
URATE SERPL-MCNC: 5.2 MG/DL (ref 3.4–7)
WBC # BLD AUTO: 8.82 K/UL (ref 3.9–12.7)

## 2022-09-27 PROCEDURE — 36415 COLL VENOUS BLD VENIPUNCTURE: CPT | Performed by: INTERNAL MEDICINE

## 2022-09-27 PROCEDURE — 85025 COMPLETE CBC W/AUTO DIFF WBC: CPT | Performed by: INTERNAL MEDICINE

## 2022-09-27 PROCEDURE — 85651 RBC SED RATE NONAUTOMATED: CPT | Performed by: INTERNAL MEDICINE

## 2022-09-27 PROCEDURE — 84550 ASSAY OF BLOOD/URIC ACID: CPT | Performed by: INTERNAL MEDICINE

## 2022-09-29 DIAGNOSIS — C61 PROSTATE CANCER: ICD-10-CM

## 2022-09-29 DIAGNOSIS — R06.00 DYSPNEA, UNSPECIFIED TYPE: ICD-10-CM

## 2022-09-29 DIAGNOSIS — E78.00 HIGH CHOLESTEROL: Primary | ICD-10-CM

## 2022-09-29 DIAGNOSIS — E03.9 HYPOTHYROIDISM, UNSPECIFIED TYPE: ICD-10-CM

## 2022-09-29 DIAGNOSIS — Z12.5 SCREENING FOR MALIGNANT NEOPLASM OF PROSTATE: Primary | ICD-10-CM

## 2022-09-29 DIAGNOSIS — I10 HYPERTENSION, UNSPECIFIED TYPE: ICD-10-CM

## 2022-10-03 ENCOUNTER — PATIENT MESSAGE (OUTPATIENT)
Dept: PULMONOLOGY | Facility: CLINIC | Age: 72
End: 2022-10-03
Payer: MEDICARE

## 2022-10-12 ENCOUNTER — CLINICAL SUPPORT (OUTPATIENT)
Dept: REHABILITATION | Facility: HOSPITAL | Age: 72
End: 2022-10-12
Payer: MEDICARE

## 2022-10-12 DIAGNOSIS — R29.818 DIFFICULTY BALANCING: Primary | ICD-10-CM

## 2022-10-12 PROCEDURE — 97110 THERAPEUTIC EXERCISES: CPT

## 2022-10-12 NOTE — PROGRESS NOTES
Physical Therapy Daily Note     Name: Adolfo Vazquez  Clinic Number: [id number]  Diagnosis:   Encounter Diagnosis   Name Primary?    Difficulty balancing Yes     Physician: David Monteiro MD  Precautions: falls  Visit #: 3 of 12  PTA Visit #: 0  Time In: 9:30  Time Out: 10:15    Subjective     Pt reports:pt. States balance is the same, a little unsteady, had a fall last week  Pain Scale: Adolfo rates pain on a scale of 0-10 to be 0 currently.    Objective     Adolfo received individual therapeutic exercises to develop endurance, core stabilization and balance  for 30 minutes including: some activities not performed  nustep x 10 mins  Leg press 70 # x 30  Balance beam. Forward heel to toe,// bar  bosu ball stabilization   Sideways amb in // bars on balance beam   SLS on airex martha LOWER EXTREMITY   Tandem BLE stance on airex beam  Toe raises at steps,  Steps  Forward  Steps sideways   Grounders, GTB  Putting GTB  Closed chain TKE 4 in step x 20 each   reebok step overs martha // bars   Bridges with SB  LTR SB  SAQ on bolster 8# x 30 each     GAIT  Gait 100'x2 with head nods  Gait 100'x2 with head turns    Theraputic activities       The patient received the following supervised modalities after being cleared for contradictions:   Written Home Exercises Provided: for balance activities   Pt demo good understanding of the education provided. Adolfo demonstrated good return demonstration of activities.     Education provided re:  Adolfo verbalized good understanding of education provided.   No spiritual or educational barriers to learning provided    Assessment     Patient tolerated skilled services to improve strength, balance and gait deficits  He remains at fall risk with activities   Balance is definitely improving as evidenced by dynamic balance exercises   ind gait without SPC     This is a 71 y.o. male referred to outpatient physical therapy and presents with a  "medical diagnosis of left acoustic neuroma  and demonstrates limitations as described in the problem list. Pt prognosis is Good. Pt will continue to benefit from skilled outpatient physical therapy to address the deficits listed in the problem list, provide pt/family education and to maximize pt's level of independence in the home and community environment.     Goals as follows:  To restore good dynamic standing balance, and improve gait to wfl without SPC      Goals:  Short Term Goals: 2 weeks   Patient compliant with initial exercises  MET     Long Term Goals: 4 weeks   1. Improve score on DGI to >/= 22/24 to demonstrate improved gait stability  2. Perform > 10 sit to stands in 30" w/o UE assist to demonstrate increased LE strength and endurance  3. Maintain  4. Strengthen balance control in everyday activities to improve fall related self-efficacy, reduce fear of falling, and increase walking speed Progressing   5. Demonstrate improved posture to decrease fall risk Progressing   6. Demonstrate independence with home exercise program to maintain gains made in therapy   Plan     Discharge from PT  Pt has reached max potential   Therapist: Dwight Clancy, PT  10/12/2022          "

## 2022-10-14 NOTE — PROGRESS NOTES
Subjective:       Patient ID: Adolfo Vazquez is a 71 y.o. male.    Chief Complaint: annuai exam    HPI   70 yo retired  comes for his periodic health exam. He recently had a gamma knife procedure for an acoustical neuroma on the left. He is still having trobule with balance and he lost about 80% of his hearing in the left ear. He walks with a cane on occassions but he is not using a cane today and goes to PT several times a week.  Review of Systems   Constitutional: Negative.    HENT:  Positive for hearing loss and tinnitus.         S/P Gamma knife for acoustical neuroma. Left    Eyes: Negative.    Respiratory: Negative.     Cardiovascular: Negative.    Gastrointestinal: Negative.    Genitourinary: Negative.         S/P Prostate Cancer treated at [facility]   Musculoskeletal: Negative.         Gout but on allopurinol 200 mg his uric acid is 5.2 and no gout attacks.   Integumentary:  Negative.   Neurological: Negative.    Psychiatric/Behavioral: Negative.     All other systems reviewed and are negative.      Objective:      Physical Exam  Constitutional:       Appearance: He is well-developed. He is obese.      Comments: BMI 36.66    255 Pounds   HENT:      Head: Normocephalic and atraumatic.      Comments: Decreased hearing in left ear with tinnitus     Right Ear: External ear normal.      Left Ear: External ear normal.   Eyes:      Conjunctiva/sclera: Conjunctivae normal.      Pupils: Pupils are equal, round, and reactive to light.   Cardiovascular:      Rate and Rhythm: Normal rate and regular rhythm.      Heart sounds: Normal heart sounds.   Pulmonary:      Effort: Pulmonary effort is normal.      Breath sounds: Normal breath sounds.   Abdominal:      General: Bowel sounds are normal.      Palpations: Abdomen is soft.   Musculoskeletal:         General: Normal range of motion.      Cervical back: Normal range of motion and neck supple.   Skin:     General: Skin is warm and dry.   Neurological:       "Mental Status: He is alert and oriented to person, place, and time.      Deep Tendon Reflexes: Reflexes are normal and symmetric.   Psychiatric:         Behavior: Behavior normal.         Thought Content: Thought content normal.         Judgment: Judgment normal.       Assessment:       Problem List Items Addressed This Visit    None  Visit Diagnoses       Annual physical exam    -  Primary              Plan:           Labs; Cholesterol:225, All other parameters are normal    Chest x-ray is clear    EKG" Nomral    IMP: Healthy Obese Male with mild Type II hyperlipidemia      "

## 2022-10-18 ENCOUNTER — CLINICAL SUPPORT (OUTPATIENT)
Dept: INTERNAL MEDICINE | Facility: CLINIC | Age: 72
End: 2022-10-18
Payer: MEDICARE

## 2022-10-18 ENCOUNTER — HOSPITAL ENCOUNTER (OUTPATIENT)
Dept: CARDIOLOGY | Facility: CLINIC | Age: 72
Discharge: HOME | End: 2022-10-18
Payer: MEDICARE

## 2022-10-18 ENCOUNTER — OFFICE VISIT (OUTPATIENT)
Dept: PULMONOLOGY | Facility: CLINIC | Age: 72
End: 2022-10-18
Payer: MEDICARE

## 2022-10-18 ENCOUNTER — HOSPITAL ENCOUNTER (OUTPATIENT)
Dept: RADIOLOGY | Facility: HOSPITAL | Age: 72
Discharge: HOME | End: 2022-10-18
Attending: INTERNAL MEDICINE
Payer: MEDICARE

## 2022-10-18 VITALS
BODY MASS INDEX: 36.58 KG/M2 | WEIGHT: 255.5 LBS | HEIGHT: 70 IN | DIASTOLIC BLOOD PRESSURE: 82 MMHG | SYSTOLIC BLOOD PRESSURE: 134 MMHG

## 2022-10-18 DIAGNOSIS — R06.00 DYSPNEA, UNSPECIFIED TYPE: ICD-10-CM

## 2022-10-18 DIAGNOSIS — E78.5 HYPERLIPIDEMIA, UNSPECIFIED HYPERLIPIDEMIA TYPE: Primary | ICD-10-CM

## 2022-10-18 DIAGNOSIS — R53.0 NEOPLASTIC MALIGNANT RELATED FATIGUE: ICD-10-CM

## 2022-10-18 DIAGNOSIS — R53.83 FATIGUE, UNSPECIFIED TYPE: ICD-10-CM

## 2022-10-18 DIAGNOSIS — R79.9 ABNORMAL BLOOD CHEMISTRY: ICD-10-CM

## 2022-10-18 DIAGNOSIS — Z12.5 SPECIAL SCREENING FOR MALIGNANT NEOPLASM OF PROSTATE: ICD-10-CM

## 2022-10-18 DIAGNOSIS — Z00.00 ANNUAL PHYSICAL EXAM: Primary | ICD-10-CM

## 2022-10-18 DIAGNOSIS — R73.09 IMPAIRED GLUCOSE TOLERANCE TEST: ICD-10-CM

## 2022-10-18 LAB
ALBUMIN SERPL BCP-MCNC: 4.1 G/DL (ref 3.5–5.2)
ALP SERPL-CCNC: 75 U/L (ref 55–135)
ALT SERPL W/O P-5'-P-CCNC: 25 U/L (ref 10–44)
ANION GAP SERPL CALC-SCNC: 8 MMOL/L (ref 8–16)
AST SERPL-CCNC: 22 U/L (ref 10–40)
BILIRUB SERPL-MCNC: 0.5 MG/DL (ref 0.1–1)
BUN SERPL-MCNC: 19 MG/DL (ref 8–23)
CALCIUM SERPL-MCNC: 10.2 MG/DL (ref 8.7–10.5)
CHLORIDE SERPL-SCNC: 104 MMOL/L (ref 95–110)
CHOLEST SERPL-MCNC: 225 MG/DL (ref 120–199)
CHOLEST/HDLC SERPL: 4.4 {RATIO} (ref 2–5)
CO2 SERPL-SCNC: 30 MMOL/L (ref 23–29)
COMPLEXED PSA SERPL-MCNC: <0.01 NG/ML (ref 0–4)
CREAT SERPL-MCNC: 1.2 MG/DL (ref 0.5–1.4)
ERYTHROCYTE [DISTWIDTH] IN BLOOD BY AUTOMATED COUNT: 12.9 % (ref 11.5–14.5)
EST. GFR  (NO RACE VARIABLE): >60 ML/MIN/1.73 M^2
ESTIMATED AVG GLUCOSE: 105 MG/DL (ref 68–131)
GLUCOSE SERPL-MCNC: 109 MG/DL (ref 70–110)
HBA1C MFR BLD: 5.3 % (ref 4–5.6)
HCT VFR BLD AUTO: 46 % (ref 40–54)
HDLC SERPL-MCNC: 51 MG/DL (ref 40–75)
HDLC SERPL: 22.7 % (ref 20–50)
HGB BLD-MCNC: 15.2 G/DL (ref 14–18)
LDLC SERPL CALC-MCNC: 148.8 MG/DL (ref 63–159)
MCH RBC QN AUTO: 32 PG (ref 27–31)
MCHC RBC AUTO-ENTMCNC: 33 G/DL (ref 32–36)
MCV RBC AUTO: 97 FL (ref 82–98)
NONHDLC SERPL-MCNC: 174 MG/DL
PLATELET # BLD AUTO: 325 K/UL (ref 150–450)
PMV BLD AUTO: 9.6 FL (ref 9.2–12.9)
POTASSIUM SERPL-SCNC: 4.5 MMOL/L (ref 3.5–5.1)
PROT SERPL-MCNC: 7.4 G/DL (ref 6–8.4)
RBC # BLD AUTO: 4.75 M/UL (ref 4.6–6.2)
SODIUM SERPL-SCNC: 142 MMOL/L (ref 136–145)
TRIGL SERPL-MCNC: 126 MG/DL (ref 30–150)
TSH SERPL DL<=0.005 MIU/L-ACNC: 2.22 UIU/ML (ref 0.4–4)
WBC # BLD AUTO: 8.78 K/UL (ref 3.9–12.7)

## 2022-10-18 PROCEDURE — 80053 COMPREHEN METABOLIC PANEL: CPT | Performed by: INTERNAL MEDICINE

## 2022-10-18 PROCEDURE — 93005 ELECTROCARDIOGRAM TRACING: CPT | Mod: PBBFAC | Performed by: INTERNAL MEDICINE

## 2022-10-18 PROCEDURE — 99999 PR PBB SHADOW E&M-EST. PATIENT-LVL III: ICD-10-PCS | Mod: PBBFAC,,, | Performed by: INTERNAL MEDICINE

## 2022-10-18 PROCEDURE — 83036 HEMOGLOBIN GLYCOSYLATED A1C: CPT | Performed by: INTERNAL MEDICINE

## 2022-10-18 PROCEDURE — 99397 PER PM REEVAL EST PAT 65+ YR: CPT | Mod: S$PBB,,, | Performed by: INTERNAL MEDICINE

## 2022-10-18 PROCEDURE — 71046 XR CHEST PA AND LATERAL: ICD-10-PCS | Mod: 26,,, | Performed by: RADIOLOGY

## 2022-10-18 PROCEDURE — 84153 ASSAY OF PSA TOTAL: CPT | Performed by: INTERNAL MEDICINE

## 2022-10-18 PROCEDURE — 99213 OFFICE O/P EST LOW 20 MIN: CPT | Mod: PBBFAC,25 | Performed by: INTERNAL MEDICINE

## 2022-10-18 PROCEDURE — 99999 PR PBB SHADOW E&M-EST. PATIENT-LVL III: CPT | Mod: PBBFAC,,, | Performed by: INTERNAL MEDICINE

## 2022-10-18 PROCEDURE — 99397 PR PREVENTIVE VISIT,EST,65 & OVER: ICD-10-PCS | Mod: S$PBB,,, | Performed by: INTERNAL MEDICINE

## 2022-10-18 PROCEDURE — 93010 EKG 12-LEAD: ICD-10-PCS | Mod: S$PBB,,, | Performed by: INTERNAL MEDICINE

## 2022-10-18 PROCEDURE — 85027 COMPLETE CBC AUTOMATED: CPT | Performed by: INTERNAL MEDICINE

## 2022-10-18 PROCEDURE — 80061 LIPID PANEL: CPT | Performed by: INTERNAL MEDICINE

## 2022-10-18 PROCEDURE — 71046 X-RAY EXAM CHEST 2 VIEWS: CPT | Mod: 26,,, | Performed by: RADIOLOGY

## 2022-10-18 PROCEDURE — 93010 ELECTROCARDIOGRAM REPORT: CPT | Mod: S$PBB,,, | Performed by: INTERNAL MEDICINE

## 2022-10-18 PROCEDURE — 84443 ASSAY THYROID STIM HORMONE: CPT | Performed by: INTERNAL MEDICINE

## 2022-10-18 PROCEDURE — 71046 X-RAY EXAM CHEST 2 VIEWS: CPT | Mod: TC,FY

## 2022-10-18 NOTE — LETTER
October 18, 2022    Adolfo Vazquez  [address]  [place] [address]             [facility]  [address]  [facility] [address]  Phone: [phone] Dear Adolfo,      Thank you for allowing me to serve you and perform your [facility] exam on 10/18/2022. This letter will serve as a brief summary of the physical findings and laboratory/studies performed and recommendations at this time. Except for your weight and a slightly elevated cholesterol, this is a normal exam.         If you have any questions or concerns, please don't hesitate to call.    Sincerely,        Rigo Paul MD

## 2022-10-20 NOTE — PLAN OF CARE
Plan of care reviewed with patient. Patient verbalized understanding. Questions and concerns addressed.    Yes

## 2022-10-23 ENCOUNTER — PATIENT MESSAGE (OUTPATIENT)
Dept: PULMONOLOGY | Facility: CLINIC | Age: 72
End: 2022-10-23
Payer: MEDICARE

## 2022-10-24 ENCOUNTER — CLINICAL SUPPORT (OUTPATIENT)
Dept: REHABILITATION | Facility: HOSPITAL | Age: 72
End: 2022-10-24
Payer: MEDICARE

## 2022-10-24 DIAGNOSIS — R29.818 DIFFICULTY BALANCING: Primary | ICD-10-CM

## 2022-10-24 PROCEDURE — 97110 THERAPEUTIC EXERCISES: CPT

## 2022-10-24 NOTE — PROGRESS NOTES
Physical Therapy Daily Note     Name: Adolfo Vazquez  Clinic Number: [id number]  Diagnosis:   Encounter Diagnosis   Name Primary?    Difficulty balancing Yes     Physician: David Monteiro MD  Precautions: falls  Visit #: 4 of 12  PTA Visit #: 0  Time In: 9:30  Time Out: 10:15    Subjective     Pt reports:pt. States balance is the same, a little unsteady, no falls since last visit  Pain Scale: Adolfo rates pain on a scale of 0-10 to be 0 currently.    Objective     Adolfo received individual therapeutic exercises to develop endurance, core stabilization and balance  for 30 minutes including: some activities not performed  nustep x 10 mins  Leg press 70 # x 30  Balance beam. Forward heel to toe,// bar  Step over blue theraband  bosu ball stabilization      Toe taps on cones  Sideways amb in // bars on balance beam   Walk around cones  SLS on airex martha LOWER EXTREMITY   Step ups on blue disc  Tandem BLE stance on airex beam   Tic tac toe  Toe raises at steps,  Steps  Forward  Steps sideways   Grounders, GTB  Putting GTB  Closed chain TKE 4 in step x 20 each   reebok step overs martha // bars   Bridges with SB  LTR SB  SAQ on bolster 8# x 30 each     GAIT  Gait 100'x2 with head nods  Gait 100'x2 with head turns    Theraputic activities       The patient received the following supervised modalities after being cleared for contradictions:   Written Home Exercises Provided: for balance activities   Pt demo good understanding of the education provided. Adolfo demonstrated good return demonstration of activities.     Education provided re:  Adolfo verbalized good understanding of education provided.   No spiritual or educational barriers to learning provided    Assessment     Patient tolerated skilled services to improve strength, balance and gait deficits  He remains at fall risk with activities   Balance is definitely improving as evidenced by dynamic balance exercises   ind  "gait without SPC     This is a 71 y.o. male referred to outpatient physical therapy and presents with a medical diagnosis of left acoustic neuroma  and demonstrates limitations as described in the problem list. Pt prognosis is Good. Pt will continue to benefit from skilled outpatient physical therapy to address the deficits listed in the problem list, provide pt/family education and to maximize pt's level of independence in the home and community environment.     Goals as follows:  To restore good dynamic standing balance, and improve gait to wfl without SPC      Goals:  Short Term Goals: 2 weeks   Patient compliant with initial exercises  MET     Long Term Goals: 4 weeks   1. Improve score on DGI to >/= 22/24 to demonstrate improved gait stability  2. Perform > 10 sit to stands in 30" w/o UE assist to demonstrate increased LE strength and endurance  3. Maintain  4. Strengthen balance control in everyday activities to improve fall related self-efficacy, reduce fear of falling, and increase walking speed Progressing   5. Demonstrate improved posture to decrease fall risk Progressing   6. Demonstrate independence with home exercise program to maintain gains made in therapy   Plan     Discharge from PT  Pt has reached max potential   Therapist: Dwight Clancy, PT  10/24/2022            "

## 2022-10-26 ENCOUNTER — CLINICAL SUPPORT (OUTPATIENT)
Dept: REHABILITATION | Facility: HOSPITAL | Age: 72
End: 2022-10-26
Payer: MEDICARE

## 2022-10-26 DIAGNOSIS — R29.818 DIFFICULTY BALANCING: Primary | ICD-10-CM

## 2022-10-26 PROCEDURE — 97110 THERAPEUTIC EXERCISES: CPT

## 2022-10-26 NOTE — PROGRESS NOTES
Physical Therapy Daily Note     Name: Adolfo Vazquez  Clinic Number: [id number]  Diagnosis:   Encounter Diagnosis   Name Primary?    Difficulty balancing Yes     Physician: David Monteiro MD  Precautions: falls  Visit #: 5 of 12  PTA Visit #: 0  Time In: 11:00  Time Out: 11:45    Subjective     Pt reports:pt. States balance is the same, a little unsteady, no falls since last visit  Pain Scale: Adolfo rates pain on a scale of 0-10 to be 0 currently.    Objective     Adolfo received individual therapeutic exercises to develop endurance, core stabilization and balance  for 45 minutes including: some activities not performed  nustep x 10 mins  Leg press 70 # x 30  Gait with head turns  Gait with vertical head turns  Gait around cones  Toe taps on cones  Gait with step over blue theraband  Step ups on blue disc  Tic tac toe  Balance beam. Forward heel to toe,// bar    bosu ball stabilization        Sideways amb in // bars on balance beam    SLS on airex martha LOWER EXTREMITY     Tandem BLE stance on airex beam     Toe raises at steps,  Steps  Forward       Steps sideways        Grounders, GTB  Putting GTB  Closed chain TKE 4 in step x 20 each   reebok step overs martha // bars   Bridges with SB  LTR SB  SAQ on bolster 8# x 30 each     GAIT  Gait 100'x2 with head nods  Gait 100'x2 with head turns    Theraputic activities       The patient received the following supervised modalities after being cleared for contradictions:   Written Home Exercises Provided: for balance activities   Pt demo good understanding of the education provided. Adolfo demonstrated good return demonstration of activities.     Education provided re:  Adolfo verbalized good understanding of education provided.   No spiritual or educational barriers to learning provided    Assessment     Patient tolerated skilled services to improve strength, balance and gait deficits  He remains at fall risk with activities  "  Balance is definitely improving as evidenced by dynamic balance exercises   ind gait without SPC     This is a 71 y.o. male referred to outpatient physical therapy and presents with a medical diagnosis of left acoustic neuroma  and demonstrates limitations as described in the problem list. Pt prognosis is Good. Pt will continue to benefit from skilled outpatient physical therapy to address the deficits listed in the problem list, provide pt/family education and to maximize pt's level of independence in the home and community environment.     Goals as follows:  To restore good dynamic standing balance, and improve gait to wfl without SPC      Goals:  Short Term Goals: 2 weeks   Patient compliant with initial exercises  MET     Long Term Goals: 4 weeks   1. Improve score on DGI to >/= 22/24 to demonstrate improved gait stability  2. Perform > 10 sit to stands in 30" w/o UE assist to demonstrate increased LE strength and endurance  3. Maintain  4. Strengthen balance control in everyday activities to improve fall related self-efficacy, reduce fear of falling, and increase walking speed Progressing   5. Demonstrate improved posture to decrease fall risk Progressing   6. Demonstrate independence with home exercise program to maintain gains made in therapy   Plan     Discharge from PT  Pt has reached max potential   Therapist: Dwight Clancy, PT  10/26/2022              "

## 2022-10-31 ENCOUNTER — CLINICAL SUPPORT (OUTPATIENT)
Dept: REHABILITATION | Facility: HOSPITAL | Age: 72
End: 2022-10-31
Payer: MEDICARE

## 2022-10-31 DIAGNOSIS — R29.818 DIFFICULTY BALANCING: Primary | ICD-10-CM

## 2022-10-31 PROCEDURE — 97110 THERAPEUTIC EXERCISES: CPT

## 2022-10-31 NOTE — PROGRESS NOTES
Physical Therapy Daily Note     Name: Adolfo Vazquez  Clinic Number: [id number]  Diagnosis:   Encounter Diagnosis   Name Primary?    Difficulty balancing Yes     Physician: David Monteiro MD  Precautions: falls  Visit #:  5 of 12  Time In:  11:00  Time Out:  11:45    Subjective     Pt reports:pt. States balance is the same, a little unsteady, no falls since last visit  Pain Scale: Adolfo rates pain on a scale of 0-10 to be 0 currently.    Objective     Adolfo received individual therapeutic exercises to develop endurance, core stabilization and balance  for 45 minutes including: some activities not performed  nustep x 10 mins  Leg press 90 #    x 30  Gait with head turns   x 3  Gait with vertical head turns  x 3  Gait around cones   x 3  Toe taps on cones   x 3  Gait with step over blue theraband x 3  Step ups on blue disc   x 30  Tic tac toe BLE   x 10  Balance beam. Forward heel to toe,// bar    bosu ball stabilization        Sideways amb in // bars on balance beam    SLS on airex martha LOWER EXTREMITY     Tandem BLE stance on airex beam     Toe raises at steps,  Steps  Forward       Steps sideways        Grounders, GTB  Putting GTB  Closed chain TKE 4 in step x 20 each   reebok step overs martha // bars   Bridges with SB  LTR SB  SAQ on bolster 8# x 30 each     GAIT  Gait 100'x2 with head nods  Gait 100'x2 with head turns    Theraputic activities       The patient received the following supervised modalities after being cleared for contradictions:   Written Home Exercises Provided: for balance activities   Pt demo good understanding of the education provided. Adolfo demonstrated good return demonstration of activities.     Education provided re:  Adolfo verbalized good understanding of education provided.   No spiritual or educational barriers to learning provided    Assessment     Patient tolerated skilled services to improve strength, balance and gait deficits  He  "remains at fall risk with activities   Balance is definitely improving as evidenced by dynamic balance exercises   ind gait without SPC     This is a 71 y.o. male referred to outpatient physical therapy and presents with a medical diagnosis of left acoustic neuroma  and demonstrates limitations as described in the problem list. Pt prognosis is Good. Pt will continue to benefit from skilled outpatient physical therapy to address the deficits listed in the problem list, provide pt/family education and to maximize pt's level of independence in the home and community environment.     Goals as follows:  To restore good dynamic standing balance, and improve gait to wfl without SPC      Goals:  Short Term Goals: 2 weeks   Patient compliant with initial exercises  MET     Long Term Goals: 4 weeks   1. Improve score on DGI to >/= 22/24 to demonstrate improved gait stability  2. Perform > 10 sit to stands in 30" w/o UE assist to demonstrate increased LE strength and endurance  3. Maintain  4. Strengthen balance control in everyday activities to improve fall related self-efficacy, reduce fear of falling, and increase walking speed Progressing   5. Demonstrate improved posture to decrease fall risk Progressing   6. Demonstrate independence with home exercise program to maintain gains made in therapy   Plan     Discharge from PT  Pt has reached max potential   Therapist: Dwight Clancy, PT  10/31/2022                "

## 2022-11-02 ENCOUNTER — CLINICAL SUPPORT (OUTPATIENT)
Dept: REHABILITATION | Facility: HOSPITAL | Age: 72
End: 2022-11-02
Payer: MEDICARE

## 2022-11-02 DIAGNOSIS — R29.818 DIFFICULTY BALANCING: Primary | ICD-10-CM

## 2022-11-02 PROCEDURE — 97110 THERAPEUTIC EXERCISES: CPT

## 2022-11-02 NOTE — PROGRESS NOTES
Physical Therapy Daily Note     Name: Adolfo Vazquez  Clinic Number: [id number]  Diagnosis:   Encounter Diagnosis   Name Primary?    Difficulty balancing Yes     Physician: David Monteiro MD  Precautions: falls  Visit #:  6 of 12  Time In:  11:00  Time Out:  11:25    Subjective     Pt reports: Pt. Reports not feeling well today, almost canceled.  Pain Scale: Adolfo rates pain on a scale of 0-10 to be 0 currently.    Objective     Adolfo received individual therapeutic exercises to develop endurance, core stabilization and balance  for 25 minutes including: some activities not performed  nustep x 10 mins  Leg press 90 #    x 30  Gait with head turns   x 3  Gait with vertical head turns  x 3  Gait around cones   x 3  Toe taps on cones   x 3  Gait with step over blue theraband x 3  Step ups on blue disc   x 30  Tic tac toe BLE   x 10  Balance beam. Forward heel to toe,// bar    bosu ball stabilization        Sideways amb in // bars on balance beam    SLS on airex martha LOWER EXTREMITY     Tandem BLE stance on airex beam     Toe raises at steps,  Steps  Forward       Steps sideways        Grounders, GTB  Putting GTB  Closed chain TKE 4 in step x 20 each   reebok step overs martha // bars   Bridges with SB  LTR SB  SAQ on bolster 8# x 30 each     GAIT  Gait 100'x2 with head nods  Gait 100'x2 with head turns    Theraputic activities       The patient received the following supervised modalities after being cleared for contradictions:   Written Home Exercises Provided: for balance activities   Pt demo good understanding of the education provided. Adolfo demonstrated good return demonstration of activities.     Education provided re:  Adolfo verbalized good understanding of education provided.   No spiritual or educational barriers to learning provided    Assessment     Patient tolerated skilled services to improve strength, balance and gait deficits  He remains at fall risk  "with activities   Balance is definitely improving as evidenced by dynamic balance exercises   ind gait without SPC     This is a 71 y.o. male referred to outpatient physical therapy and presents with a medical diagnosis of left acoustic neuroma  and demonstrates limitations as described in the problem list. Pt prognosis is Good. Pt will continue to benefit from skilled outpatient physical therapy to address the deficits listed in the problem list, provide pt/family education and to maximize pt's level of independence in the home and community environment.     Goals as follows:  To restore good dynamic standing balance, and improve gait to wfl without SPC      Goals:  Short Term Goals: 2 weeks   Patient compliant with initial exercises  MET     Long Term Goals: 4 weeks   1. Improve score on DGI to >/= 22/24 to demonstrate improved gait stability  2. Perform > 10 sit to stands in 30" w/o UE assist to demonstrate increased LE strength and endurance  3. Maintain  4. Strengthen balance control in everyday activities to improve fall related self-efficacy, reduce fear of falling, and increase walking speed Progressing   5. Demonstrate improved posture to decrease fall risk Progressing   6. Demonstrate independence with home exercise program to maintain gains made in therapy   Plan     Discharge from PT  Pt has reached max potential   Therapist: Dwight Clancy, PT  11/2/2022                  "

## 2022-11-04 DIAGNOSIS — E78.00 HIGH CHOLESTEROL: Primary | ICD-10-CM

## 2022-11-04 RX ORDER — SIMVASTATIN 40 MG/1
40 TABLET, FILM COATED ORAL NIGHTLY
Qty: 90 TABLET | Refills: 3 | Status: SHIPPED | OUTPATIENT
Start: 2022-11-04 | End: 2023-09-17

## 2022-11-07 ENCOUNTER — CLINICAL SUPPORT (OUTPATIENT)
Dept: REHABILITATION | Facility: HOSPITAL | Age: 72
End: 2022-11-07
Payer: MEDICARE

## 2022-11-07 DIAGNOSIS — R29.818 DIFFICULTY BALANCING: Primary | ICD-10-CM

## 2022-11-07 PROCEDURE — 97110 THERAPEUTIC EXERCISES: CPT

## 2022-11-07 NOTE — PROGRESS NOTES
Physical Therapy Daily Note     Name: Adolfo Vazquez  Clinic Number: [id number]  Diagnosis:   Encounter Diagnosis   Name Primary?    Difficulty balancing Yes     Physician: David Monteiro MD  Precautions: falls  Visit #:  7 of 12  Time In:  11:00  Time Out:  11:25    Subjective     Pt reports: Pt. with no complaints this day  Pain Scale: Adolfo rates pain on a scale of 0-10 to be 0 currently.    Objective     Adolfo received individual therapeutic exercises to develop endurance, core stabilization and balance  for 25 minutes including: some activities not performed  nustep x 10 mins  Leg press 90 #    x 30  Gait with head turns   x 3  Gait with vertical head turns  x 3  Gait around cones   x 3  Toe taps on cones   x 3  Gait with step over blue theraband x 3  Step ups on blue disc   x 30  Tic tac toe BLE   x 10  Balance beam. Forward heel to toe,// bar    bosu ball stabilization        Sideways amb in // bars on balance beam    SLS on airex martha LOWER EXTREMITY     Tandem BLE stance on airex beam     Toe raises at steps,  Steps  Forward       Steps sideways        Grounders, GTB  Putting GTB  Closed chain TKE 4 in step x 20 each   reebok step overs martha // bars   Bridges with SB  LTR SB  SAQ on bolster 8# x 30 each     GAIT  Gait 100'x2 with head nods  Gait 100'x2 with head turns    Theraputic activities       The patient received the following supervised modalities after being cleared for contradictions:   Written Home Exercises Provided: for balance activities   Pt demo good understanding of the education provided. Adolfo demonstrated good return demonstration of activities.     Education provided re:  Adolfo verbalized good understanding of education provided.   No spiritual or educational barriers to learning provided    Assessment     Patient tolerated skilled services to improve strength, balance and gait deficits  He remains at fall risk with activities  "  Balance is definitely improving as evidenced by dynamic balance exercises   ind gait without SPC     This is a 71 y.o. male referred to outpatient physical therapy and presents with a medical diagnosis of left acoustic neuroma  and demonstrates limitations as described in the problem list. Pt prognosis is Good. Pt will continue to benefit from skilled outpatient physical therapy to address the deficits listed in the problem list, provide pt/family education and to maximize pt's level of independence in the home and community environment.     Goals as follows:  To restore good dynamic standing balance, and improve gait to wfl without SPC      Goals:  Short Term Goals: 2 weeks   Patient compliant with initial exercises  MET     Long Term Goals: 4 weeks   1. Improve score on DGI to >/= 22/24 to demonstrate improved gait stability  2. Perform > 10 sit to stands in 30" w/o UE assist to demonstrate increased LE strength and endurance  3. Maintain  4. Strengthen balance control in everyday activities to improve fall related self-efficacy, reduce fear of falling, and increase walking speed Progressing   5. Demonstrate improved posture to decrease fall risk Progressing   6. Demonstrate independence with home exercise program to maintain gains made in therapy   Plan     Discharge from PT  Pt has reached max potential   Therapist: Dwight Clancy, PT  11/7/2022                    "

## 2022-11-09 ENCOUNTER — CLINICAL SUPPORT (OUTPATIENT)
Dept: REHABILITATION | Facility: HOSPITAL | Age: 72
End: 2022-11-09
Payer: MEDICARE

## 2022-11-09 DIAGNOSIS — R29.818 DIFFICULTY BALANCING: Primary | ICD-10-CM

## 2022-11-09 PROCEDURE — 97110 THERAPEUTIC EXERCISES: CPT

## 2022-11-09 NOTE — PROGRESS NOTES
Physical Therapy Daily Note     Name: Adolfo Vazquez  Clinic Number: [id number]  Diagnosis:   Encounter Diagnosis   Name Primary?    Difficulty balancing Yes     Physician: David Monteiro MD  Precautions: falls  Visit #:  8 of 12  Time In:  11:00  Time Out:  11:40    Subjective     Pt reports: Pt. with no complaints this day  Pain Scale: Adolfo rates pain on a scale of 0-10 to be 0 currently.    Objective     Adolfo received individual therapeutic exercises to develop endurance, core stabilization and balance  for 40 minutes including:   nustep x 10 mins  Leg press 90 #    x 30  Gait with head turns   x 3  Gait with vertical head turns  x 3  Gait around cones   x 3  Toe taps on cones   x 3  Gait with step over blue theraband x 3  Step ups on blue disc   x 30  Tic tac toe BLE   x 10  Balance beam. Forward heel to toe,// bar    bosu ball stabilization        Sideways amb in // bars on balance beam    SLS on airex martha LOWER EXTREMITY     Tandem BLE stance on airex beam     Toe raises at steps,  Steps  Forward       Steps sideways        Grounders, GTB  Putting GTB  Closed chain TKE 4 in step x 20 each   reebok step overs martha // bars   Bridges with SB  LTR SB  SAQ on bolster 8# x 30 each     GAIT  Gait 100'x2 with head nods  Gait 100'x2 with head turns            Written Home Exercises Provided: for balance activities   Pt demo good understanding of the education provided. Adolfo demonstrated good return demonstration of activities.     Education provided re:  Adolfo verbalized good understanding of education provided.   No spiritual or educational barriers to learning provided    Assessment     Patient tolerated skilled services to improve strength, balance and gait deficits  He remains at fall risk with activities   Balance is definitely improving as evidenced by dynamic balance exercises   ind gait without SPC     This is a 71 y.o. male referred to outpatient  "physical therapy and presents with a medical diagnosis of left acoustic neuroma  and demonstrates limitations as described in the problem list. Pt prognosis is Good. Pt will continue to benefit from skilled outpatient physical therapy to address the deficits listed in the problem list, provide pt/family education and to maximize pt's level of independence in the home and community environment.     Goals as follows:  To restore good dynamic standing balance, and improve gait to wfl without SPC      Goals:  Short Term Goals: 2 weeks   Patient compliant with initial exercises  MET     Long Term Goals: 4 weeks   1. Improve score on DGI to >/= 22/24 to demonstrate improved gait stability  2. Perform > 10 sit to stands in 30" w/o UE assist to demonstrate increased LE strength and endurance  3. Maintain  4. Strengthen balance control in everyday activities to improve fall related self-efficacy, reduce fear of falling, and increase walking speed Progressing   5. Demonstrate improved posture to decrease fall risk Progressing   6. Demonstrate independence with home exercise program to maintain gains made in therapy   Plan     Discharge from PT  Pt has reached max potential   Therapist: Dwight Clancy, PT  11/9/2022                      "

## 2022-11-14 ENCOUNTER — OFFICE VISIT (OUTPATIENT)
Dept: FAMILY MEDICINE | Facility: CLINIC | Age: 72
End: 2022-11-14
Payer: MEDICARE

## 2022-11-14 VITALS
DIASTOLIC BLOOD PRESSURE: 68 MMHG | WEIGHT: 261.13 LBS | HEIGHT: 70 IN | RESPIRATION RATE: 16 BRPM | SYSTOLIC BLOOD PRESSURE: 136 MMHG | OXYGEN SATURATION: 98 % | BODY MASS INDEX: 37.38 KG/M2 | HEART RATE: 72 BPM

## 2022-11-14 DIAGNOSIS — B96.89 ACUTE BACTERIAL BRONCHITIS: Primary | ICD-10-CM

## 2022-11-14 DIAGNOSIS — J20.8 ACUTE BACTERIAL BRONCHITIS: Primary | ICD-10-CM

## 2022-11-14 PROCEDURE — 99214 PR OFFICE/OUTPT VISIT, EST, LEVL IV, 30-39 MIN: ICD-10-PCS | Mod: S$PBB,,, | Performed by: FAMILY MEDICINE

## 2022-11-14 PROCEDURE — 99999 PR PBB SHADOW E&M-EST. PATIENT-LVL IV: ICD-10-PCS | Mod: PBBFAC,,, | Performed by: FAMILY MEDICINE

## 2022-11-14 PROCEDURE — 99999 PR PBB SHADOW E&M-EST. PATIENT-LVL IV: CPT | Mod: PBBFAC,,, | Performed by: FAMILY MEDICINE

## 2022-11-14 PROCEDURE — 99214 OFFICE O/P EST MOD 30 MIN: CPT | Mod: S$PBB,,, | Performed by: FAMILY MEDICINE

## 2022-11-14 PROCEDURE — 99214 OFFICE O/P EST MOD 30 MIN: CPT | Mod: PBBFAC | Performed by: FAMILY MEDICINE

## 2022-11-14 RX ORDER — AZITHROMYCIN 250 MG/1
TABLET, FILM COATED ORAL
Qty: 6 TABLET | Refills: 0 | Status: SHIPPED | OUTPATIENT
Start: 2022-11-14 | End: 2022-11-19

## 2022-11-14 RX ORDER — METHYLPREDNISOLONE 4 MG/1
TABLET ORAL
Qty: 1 EACH | Refills: 0 | Status: SHIPPED | OUTPATIENT
Start: 2022-11-14 | End: 2022-12-05

## 2022-11-14 RX ORDER — PROMETHAZINE HYDROCHLORIDE AND DEXTROMETHORPHAN HYDROBROMIDE 6.25; 15 MG/5ML; MG/5ML
5 SYRUP ORAL EVERY 4 HOURS PRN
Qty: 118 ML | Refills: 0 | Status: SHIPPED | OUTPATIENT
Start: 2022-11-14 | End: 2022-11-24

## 2022-11-14 NOTE — PROGRESS NOTES
[facility] - Clinic Note    Subjective      Mr. Vazquez is a 71 y.o. male who presents to clinic with complaints of a cough.     Reports a cough for the past week.   Dry non-productive cough  Admits to wheezing and chest congestion.   No known sick contacts.   Denies fevers.   Took OTC cough syrups and albuterol inhaler intermittently.     PMH Adolfo has a past medical history of Anxiety, Colon polyp, Depression, Elevated PSA, High cholesterol, Hypertension, and Prostate cancer.   PSXH Adolfo has a past surgical history that includes Prostate surgery; Colon surgery; Colonoscopy (N/A, 1/24/2019); Skin cancer excision; and Colonoscopy (N/A, 9/21/2022).    Adolfo's family history includes Cancer in his father and maternal uncle; Coronary artery disease in his maternal grandfather; Emphysema in his mother.   SH Adolfo reports that he has never smoked. He has never used smokeless tobacco. He reports current alcohol use of about 24.0 standard drinks per week. He reports that he does not use drugs.   ALG Adolfo has No Known Allergies.   MED Adolfo has a current medication list which includes the following prescription(s): abilify, allopurinol, alprazolam, azelastine, bupropion, bupropion, ciclopirox, clonazepam, cymbalta, fluzone high-dose 2016-17 (pf), levothyroxine, losartan-hydrochlorothiazide 50-12.5 mg, shingrix (pf), simvastatin, trazodone, azithromycin, methylprednisolone, and promethazine-dextromethorphan.     Review of Systems   Constitutional:  Negative for activity change, appetite change, chills, fatigue and fever.   Eyes:  Negative for visual disturbance.   Respiratory:  Negative for cough and shortness of breath.    Cardiovascular:  Negative for chest pain, palpitations and leg swelling.   Gastrointestinal:  Negative for abdominal pain, nausea and vomiting.   Skin:  Negative for wound.   Neurological:  Negative for dizziness and headaches.   Psychiatric/Behavioral:  Negative for confusion.    Objective  "    /68 (BP Location: Left arm, Patient Position: Sitting, BP Method: Medium (Manual))   Pulse 72   Resp 16   Ht 5' 10" (1.778 m)   Wt 118.4 kg (261 lb 2 oz)   SpO2 98%   BMI 37.47 kg/m²     Physical Exam   Constitutional: normal appearance. He appears obese.  Non-toxic appearance. No distress. He does not appear ill.   HENT:   Head: Normocephalic and atraumatic.   Eyes: Right eye exhibits no discharge. Left eye exhibits no discharge.   Cardiovascular: Normal rate, regular rhythm, normal heart sounds and normal pulses. Exam reveals no gallop and no friction rub.   No murmur heard.Pulmonary:      Effort: Pulmonary effort is normal. No respiratory distress.      Breath sounds: Wheezing and rhonchi present. No rales.     Abdominal: Normal appearance.   Musculoskeletal:      Right lower leg: No edema.      Left lower leg: No edema.   Lymphadenopathy:     He has no cervical adenopathy.   Neurological: He is alert.   Skin: Skin is warm and dry. Capillary refill takes less than 2 seconds. He is not diaphoretic.   Psychiatric: His behavior is normal. Mood, judgment and thought content normal.   Vitals reviewed.   Assessment/Plan     Adolfo was seen today for cough.    Diagnoses and all orders for this visit:  -New patient and new problem to me    Acute bacterial bronchitis  -     azithromycin (Z-JULIAN) 250 MG tablet; Take 2 tablets by mouth on day 1; Take 1 tablet by mouth on days 2-5  -     promethazine-dextromethorphan (PROMETHAZINE-DM) 6.25-15 mg/5 mL Syrp; Take 5 mLs by mouth every 4 (four) hours as needed (cough).  -     methylPREDNISolone (MEDROL DOSEPACK) 4 mg tablet; use as directed    -schedule albuterol inhaler TID for the next 5 days.    Follow up if symptoms worsen or fail to improve.    Future Appointments   Date Time Provider Department Center   11/16/2022 11:00 AM Dwight Clancy PT [facility]       Julianne Shaikh MD  Family Medicine  [facility]            "

## 2022-11-16 ENCOUNTER — CLINICAL SUPPORT (OUTPATIENT)
Dept: REHABILITATION | Facility: HOSPITAL | Age: 72
End: 2022-11-16
Payer: MEDICARE

## 2022-11-16 DIAGNOSIS — R29.818 DIFFICULTY BALANCING: Primary | ICD-10-CM

## 2022-11-16 PROCEDURE — 97110 THERAPEUTIC EXERCISES: CPT

## 2022-11-16 NOTE — PROGRESS NOTES
Physical Therapy Daily Note     Name: Adolfo Vazquez  Clinic Number: [id number]  Diagnosis:   Encounter Diagnosis   Name Primary?    Difficulty balancing Yes     Physician: David Monteiro MD  Precautions: falls  Visit #:  9 of 12  Time In:  11:00  Time Out:  11:30    Subjective     Pt reports: I saw the MD yesterday and I have bronchitis  Pain Scale: Adolfo rates pain on a scale of 0-10 to be 0 currently.    Objective     Adolfo received individual therapeutic exercises to develop endurance, core stabilization and balance  for 30 minutes including:   nustep x 10 mins  Leg press 90 #    x 30  Gait with head turns   x 3  Gait with vertical head turns  x 3  Gait around cones   x 3  Toe taps on cones   x 3  Gait with step over blue theraband x 3  Step ups on blue disc   x 30  Tic tac toe BLE   x 10  Balance beam. Forward heel to toe,// bar    bosu ball stabilization        Sideways amb in // bars on balance beam    SLS on airex martha LOWER EXTREMITY     Tandem BLE stance on airex beam     Toe raises at steps,  Steps  Forward       Steps sideways        Grounders, GTB  Putting GTB  Closed chain TKE 4 in step x 20 each   reebok step overs martha // bars   Bridges with SB  LTR SB  SAQ on bolster 8# x 30 each     GAIT  Gait 100'x2 with head nods  Gait 100'x2 with head turns            Written Home Exercises Provided: for balance activities   Pt demo good understanding of the education provided. Adolfo demonstrated good return demonstration of activities.     Education provided re:  Adolfo verbalized good understanding of education provided.   No spiritual or educational barriers to learning provided    Assessment     Patient tolerated skilled services to improve strength, balance and gait deficits  He remains at fall risk with activities   Balance is definitely improving as evidenced by dynamic balance exercises   ind gait without SPC     This is a 72 y.o. male referred to  "outpatient physical therapy and presents with a medical diagnosis of left acoustic neuroma  and demonstrates limitations as described in the problem list. Pt prognosis is Good. Pt will continue to benefit from skilled outpatient physical therapy to address the deficits listed in the problem list, provide pt/family education and to maximize pt's level of independence in the home and community environment.     Goals as follows:  To restore good dynamic standing balance, and improve gait to wfl without SPC      Goals:  Short Term Goals: 2 weeks   Patient compliant with initial exercises  MET     Long Term Goals: 4 weeks   1. Improve score on DGI to >/= 22/24 to demonstrate improved gait stability  2. Perform > 10 sit to stands in 30" w/o UE assist to demonstrate increased LE strength and endurance  3. Maintain  4. Strengthen balance control in everyday activities to improve fall related self-efficacy, reduce fear of falling, and increase walking speed Progressing   5. Demonstrate improved posture to decrease fall risk Progressing   6. Demonstrate independence with home exercise program to maintain gains made in therapy   Plan     Discharge from PT  Pt has reached max potential   Therapist: Dwight Clancy, PT  11/16/2022                        "

## 2022-12-29 ENCOUNTER — PATIENT MESSAGE (OUTPATIENT)
Dept: PULMONOLOGY | Facility: CLINIC | Age: 72
End: 2022-12-29
Payer: MEDICARE

## 2023-04-03 ENCOUNTER — TELEPHONE (OUTPATIENT)
Dept: PULMONOLOGY | Facility: CLINIC | Age: 73
End: 2023-04-03
Payer: MEDICARE

## 2023-04-03 ENCOUNTER — PATIENT MESSAGE (OUTPATIENT)
Dept: PULMONOLOGY | Facility: CLINIC | Age: 73
End: 2023-04-03
Payer: MEDICARE

## 2023-04-04 ENCOUNTER — PATIENT MESSAGE (OUTPATIENT)
Dept: PULMONOLOGY | Facility: CLINIC | Age: 73
End: 2023-04-04
Payer: MEDICARE

## 2023-04-04 DIAGNOSIS — R35.0 FREQUENT URINATION: Primary | ICD-10-CM

## 2023-04-10 ENCOUNTER — TELEPHONE (OUTPATIENT)
Dept: UROLOGY | Facility: CLINIC | Age: 73
End: 2023-04-10
Payer: MEDICARE

## 2023-04-11 ENCOUNTER — OFFICE VISIT (OUTPATIENT)
Dept: UROLOGY | Facility: CLINIC | Age: 73
End: 2023-04-11
Payer: MEDICARE

## 2023-04-11 VITALS
BODY MASS INDEX: 37.9 KG/M2 | DIASTOLIC BLOOD PRESSURE: 90 MMHG | SYSTOLIC BLOOD PRESSURE: 155 MMHG | HEART RATE: 91 BPM | WEIGHT: 264.75 LBS | HEIGHT: 70 IN

## 2023-04-11 DIAGNOSIS — R35.0 FREQUENT URINATION: ICD-10-CM

## 2023-04-11 PROCEDURE — 99204 OFFICE O/P NEW MOD 45 MIN: CPT | Mod: S$PBB,,, | Performed by: UROLOGY

## 2023-04-11 PROCEDURE — 99214 OFFICE O/P EST MOD 30 MIN: CPT | Mod: PBBFAC | Performed by: UROLOGY

## 2023-04-11 PROCEDURE — 99999 PR PBB SHADOW E&M-EST. PATIENT-LVL IV: ICD-10-PCS | Mod: PBBFAC,,, | Performed by: UROLOGY

## 2023-04-11 PROCEDURE — 99999 PR PBB SHADOW E&M-EST. PATIENT-LVL IV: CPT | Mod: PBBFAC,,, | Performed by: UROLOGY

## 2023-04-11 PROCEDURE — 99204 PR OFFICE/OUTPT VISIT, NEW, LEVL IV, 45-59 MIN: ICD-10-PCS | Mod: S$PBB,,, | Performed by: UROLOGY

## 2023-04-11 RX ORDER — ZOLPIDEM TARTRATE 10 MG/1
10 TABLET ORAL NIGHTLY PRN
COMMUNITY
Start: 2023-04-05 | End: 2023-08-16

## 2023-04-12 NOTE — PROGRESS NOTES
[facility]      New Overactive Bladder (OAB) Note    4/12/2023    Referred by:  Rigo Paul, *    HPI: Adolfo Vazquez is a very pleasant 72 y.o. male who is a new patient to our department referred for evaluation of urinary incontinence of 2-3 months duration. He reports bother is associated with urinary frequency (24hrs) (7-8x daily), with nocturia (3x per night) and with urgency that does not result in urinary incontinence. He does report urinary urgency without incontinence 3x per day. He reports some stress urinary incontinence associated with exertion such as straining; this is relatively uncommon. He does not require daily pad use.  He has decreased overall fluid intake.  He reports urinary incontinence is only during the day. Patient reports urgency is independent of position.     He has a history of prostate cancer treated with RALP in 2014. His most recent PSA was <0.001    He denies symptoms of irritative voiding including dysuria. He denies symptoms of obstructive voiding including decreased stream, hesitancy, intermittency, post void dribbling, and sense of incomplete emptying. Bladder scan PVR was 92 mL. His history includes no notation of urolithiasis, hematuria, prior pelvic surgery, previous prolapse or incontinence procedures or neurological symptoms/diagnoses. He denies all other prior pelvic surgeries or neurologic diagnoses.     Previous incontinence therapies:  Behavioral Therapies  : (pelvic floor exercises fluid/dietary modification) -  provided some but insufficient benefit  Medications  None  Other Therapies  No Other Therapies    A review of 10+ systems was conducted with pertinent positive and negative findings documented in HPI with all other systems reviewed and negative.    Past medical, family, surgical and social history reviewed as documented in chart with pertinent positive medical, family, surgical and social history detailed in HPI.    Exam  Findings:    Const: no acute distress, conversant and alert  Eyes: anicteric, extraocular muscles intact  ENMT: normocephalic, Nl oral membranes  Cardio: no cyanosis, nl cap refill  Pulm: no tachypnea; no resp distress  Musc: no laceration, no tenderness  Neuro: alert; oriented x 3  Skin: warm, dry; no petichiae  Psych: no anxiety; normal speech     Assessment/Plan:    Overactive Bladder without Incontinence (new, addt'l workup): His history is suggestive of Overactive Bladder (OAB) without predominantly Urgency Urinary Incontinence (UUI). The presence or absence of incontinence as well as the relative contribution of stress or urgency incontinence can be further clarified with a 3-day volume-based voiding/incontinence diary provided today. This will also help to screen for polyuria and help to guide us on further counseling on behavioral therapy including timed voiding and bladder training. We will review this on his return visit.     His reported symptoms today are relatively moderate and therefore, I believe it would be appropriate to begin pharmacologic therapy in addition to behavioral therapies.     Patient was given a 3-day voiding diary to complete before next visit. We will further discuss behavioral therapy at that time.   We discussed behavioral therapies including fluid/dietary modification, timed voiding with bladder training, and pelvic floor exercises.  We will begin an appropriate OAB medication based medication history, comorbid conditions and formulary coverage.   Myrbetriq 50 mg daily

## 2023-05-12 ENCOUNTER — PATIENT MESSAGE (OUTPATIENT)
Dept: NEUROSURGERY | Facility: CLINIC | Age: 73
End: 2023-05-12
Payer: MEDICARE

## 2023-05-16 ENCOUNTER — TELEPHONE (OUTPATIENT)
Dept: NEUROSURGERY | Facility: CLINIC | Age: 73
End: 2023-05-16
Payer: MEDICARE

## 2023-05-16 DIAGNOSIS — D33.3 LEFT ACOUSTIC NEUROMA: Primary | ICD-10-CM

## 2023-05-22 ENCOUNTER — PATIENT MESSAGE (OUTPATIENT)
Dept: NEUROSURGERY | Facility: CLINIC | Age: 73
End: 2023-05-22
Payer: MEDICARE

## 2023-05-22 ENCOUNTER — HOSPITAL ENCOUNTER (OUTPATIENT)
Dept: RADIOLOGY | Facility: HOSPITAL | Age: 73
Discharge: HOME | End: 2023-05-22
Attending: NEUROLOGICAL SURGERY
Payer: MEDICARE

## 2023-05-22 DIAGNOSIS — D33.3 LEFT ACOUSTIC NEUROMA: ICD-10-CM

## 2023-05-22 PROCEDURE — A9585 GADOBUTROL INJECTION: HCPCS | Performed by: NEUROLOGICAL SURGERY

## 2023-05-22 PROCEDURE — 70553 MRI BRAIN STEM W/O & W/DYE: CPT | Mod: TC

## 2023-05-22 PROCEDURE — 70553 MRI BRAIN W WO CONTRAST: ICD-10-PCS | Mod: 26,,, | Performed by: RADIOLOGY

## 2023-05-22 PROCEDURE — 25500020 PHARM REV CODE 255: Performed by: NEUROLOGICAL SURGERY

## 2023-05-22 PROCEDURE — 70553 MRI BRAIN STEM W/O & W/DYE: CPT | Mod: 26,,, | Performed by: RADIOLOGY

## 2023-05-22 RX ORDER — GADOBUTROL 604.72 MG/ML
10 INJECTION INTRAVENOUS
Status: COMPLETED | OUTPATIENT
Start: 2023-05-22 | End: 2023-05-22

## 2023-05-22 RX ADMIN — GADOBUTROL 10 ML: 604.72 INJECTION INTRAVENOUS at 09:05

## 2023-05-30 ENCOUNTER — OFFICE VISIT (OUTPATIENT)
Dept: NEUROSURGERY | Facility: CLINIC | Age: 73
End: 2023-05-30
Payer: MEDICARE

## 2023-05-30 DIAGNOSIS — D33.3 LEFT ACOUSTIC NEUROMA: Primary | ICD-10-CM

## 2023-05-30 DIAGNOSIS — R26.81 GAIT INSTABILITY: ICD-10-CM

## 2023-05-30 PROCEDURE — 99214 PR OFFICE/OUTPT VISIT, EST, LEVL IV, 30-39 MIN: ICD-10-PCS | Mod: 95,,, | Performed by: NEUROLOGICAL SURGERY

## 2023-05-30 PROCEDURE — 99214 OFFICE O/P EST MOD 30 MIN: CPT | Mod: 95,,, | Performed by: NEUROLOGICAL SURGERY

## 2023-05-30 NOTE — PATIENT INSTRUCTIONS
I have personally reviewed the MRI brain with the pt which shows:  1. Interval decrease in size of the left internal auditory canal lesion favored to represent a vestibular schwannoma.  2. No acute intracranial abnormality or new enhancing lesion.    I will schedule the patient for  2 year follow up with MRI brain.

## 2023-05-30 NOTE — PROGRESS NOTES
The patient location is: [place]  The chief complaint leading to consultation is: follow up    Visit type: audiovisual    Face to Face time with patient: 10 minutes of total time spent on the encounter, which includes face to face time and non-face to face time preparing to see the patient (eg, review of tests), Obtaining and/or reviewing separately obtained history, Documenting clinical information in the electronic or other health record, Independently interpreting results (not separately reported) and communicating results to the patient/family/caregiver, or Care coordination (not separately reported).         Each patient to whom he or she provides medical services by telemedicine is:  (1) informed of the relationship between the physician and patient and the respective role of any other health care provider with respect to management of the patient; and (2) notified that he or she may decline to receive medical services by telemedicine and may withdraw from such care at any time.    Notes:    Subjective:   Ameena PFEIFFER, attest that this documentation has been prepared under the direction and in the presence of aDvid Monteiro MD.     Patient ID: Adolfo Vazquez is a 72 y.o. male     Chief Complaint: No chief complaint on file.      HPI  Mr. Adolfo Vazquez is a 72 y.o. gentleman with a history of HTN, left acoustic neuroma, s/p GSRS (14Gy to the 50% isodose line) done on 3/9/2022, who presents today for 1 year follow up with MRI brain. This is a pt who began having hearing loss and tinnitus to his left ear. An MRI was ordered showing concerns for acoustic neuroma. He was also having issues with his balance when he walks.The pt had audiogram done on 3/28/22. Per note, results revealed mild sensorineural hearing loss in the right ear and mild to moderately-severe sensorineural hearing loss in the left ear.  At the time of our last visit on 7/26/2022, the pt reports he continues to have balance issues and gait  instability. He reports a fall before his previous visit. The pt states he had one foot on the bed and fell over to the side.     Today the pt reports his gait has improved. He is able to stand for longer lengths of time without becoming at risk for fall. He continues to have hearing loss but this is unchanged from prior. Pt is otherwise doing well in the interim with no acute complaints.    Review of Systems   Constitutional:  Negative for activity change, appetite change, fatigue, fever and unexpected weight change.   HENT:  Positive for hearing loss. Negative for facial swelling.    Eyes: Negative.    Respiratory: Negative.     Cardiovascular: Negative.    Gastrointestinal:  Negative for diarrhea, nausea and vomiting.   Endocrine: Negative.    Genitourinary: Negative.    Musculoskeletal:  Negative for back pain, joint swelling, myalgias and neck pain.   Neurological:  Negative for dizziness, seizures, weakness, numbness and headaches.   Psychiatric/Behavioral: Negative.        Past Medical History:   Diagnosis Date    Anxiety     Colon polyp     benign    Depression     Elevated PSA     High cholesterol     Hypertension     Prostate cancer        Objective:    There were no vitals filed for this visit.   Physical Exam  Constitutional:       General: He is not in acute distress.     Appearance: Normal appearance.   HENT:      Head: Normocephalic and atraumatic.   Neurological:      Mental Status: He is alert and oriented to person, place, and time.       IMAGING:  MRI Brain W WO Contrast (5/22/2023):  1. Interval decrease in size of the left internal auditory canal lesion favored to represent a vestibular schwannoma.  2. No acute intracranial abnormality or new enhancing lesion.    I have personally reviewed the images with the pt.      I, Dr. David Monteiro, personally performed the services described in this documentation. All medical record entries made by the scribe, Ameena Murray, were at my direction and in my  presence.  I have reviewed the chart and agree that the record reflects my personal performance and is accurate and complete. David Monteiro MD. 05/30/2023    Assessment:       Acoustic neuroma     Plan:   I have personally reviewed the MRI brain with the pt which shows:  1. Interval decrease in size of the left internal auditory canal lesion favored to represent a vestibular schwannoma.  2. No acute intracranial abnormality or new enhancing lesion.    I will schedule the patient for  2 year follow up with MRI brain.

## 2023-06-21 DIAGNOSIS — E05.90 HYPERTHYROIDISM: ICD-10-CM

## 2023-06-22 RX ORDER — LEVOTHYROXINE SODIUM 100 UG/1
100 TABLET ORAL EVERY MORNING
Qty: 90 TABLET | Refills: 3 | Status: SHIPPED | OUTPATIENT
Start: 2023-06-22

## 2023-08-14 ENCOUNTER — PATIENT MESSAGE (OUTPATIENT)
Dept: FAMILY MEDICINE | Facility: CLINIC | Age: 73
End: 2023-08-14
Payer: MEDICARE

## 2023-08-16 ENCOUNTER — OFFICE VISIT (OUTPATIENT)
Dept: FAMILY MEDICINE | Facility: CLINIC | Age: 73
End: 2023-08-16
Payer: MEDICARE

## 2023-08-16 VITALS
DIASTOLIC BLOOD PRESSURE: 80 MMHG | HEIGHT: 70 IN | HEART RATE: 59 BPM | SYSTOLIC BLOOD PRESSURE: 128 MMHG | BODY MASS INDEX: 38.48 KG/M2 | RESPIRATION RATE: 16 BRPM | TEMPERATURE: 97 F | OXYGEN SATURATION: 95 % | WEIGHT: 268.81 LBS

## 2023-08-16 DIAGNOSIS — I10 ESSENTIAL HYPERTENSION: ICD-10-CM

## 2023-08-16 DIAGNOSIS — Z85.46 HISTORY OF PROSTATE CANCER: ICD-10-CM

## 2023-08-16 DIAGNOSIS — E03.9 HYPOTHYROIDISM, UNSPECIFIED TYPE: ICD-10-CM

## 2023-08-16 DIAGNOSIS — R79.9 ABNORMAL FINDING OF BLOOD CHEMISTRY, UNSPECIFIED: ICD-10-CM

## 2023-08-16 DIAGNOSIS — Z00.00 ANNUAL PHYSICAL EXAM: Primary | ICD-10-CM

## 2023-08-16 PROCEDURE — 99397 PR PREVENTIVE VISIT,EST,65 & OVER: ICD-10-PCS | Mod: GZ,S$PBB,, | Performed by: FAMILY MEDICINE

## 2023-08-16 PROCEDURE — 99214 OFFICE O/P EST MOD 30 MIN: CPT | Mod: PBBFAC | Performed by: FAMILY MEDICINE

## 2023-08-16 PROCEDURE — 99999 PR PBB SHADOW E&M-EST. PATIENT-LVL IV: CPT | Mod: PBBFAC,,, | Performed by: FAMILY MEDICINE

## 2023-08-16 PROCEDURE — 99397 PER PM REEVAL EST PAT 65+ YR: CPT | Mod: GZ,S$PBB,, | Performed by: FAMILY MEDICINE

## 2023-08-16 PROCEDURE — 99999 PR PBB SHADOW E&M-EST. PATIENT-LVL IV: ICD-10-PCS | Mod: PBBFAC,,, | Performed by: FAMILY MEDICINE

## 2023-08-16 NOTE — PROGRESS NOTES
[facility] - Clinic Note    Subjective      Mr. Vazquez is a 72 y.o. male who presents to clinic for Establish Care    Patient has history of hypertension, left acoustic neuroma status post GS are as done on 03/09/22, prostate cancer status post surgery.  He sees a psychiatrist.  He has been doing well with his medications.  Also has a history of hypothyroidism that is controlled.    PMH Adolfo has a past medical history of Anxiety, Colon polyp, Depression, Elevated PSA, High cholesterol, Hypertension, and Prostate cancer.   PSXH Adolfo has a past surgical history that includes Prostate surgery; Colon surgery; Colonoscopy (N/A, 1/24/2019); Skin cancer excision; and Colonoscopy (N/A, 9/21/2022).    Adolfo's family history includes Cancer in his father and maternal uncle; Coronary artery disease in his maternal grandfather; Emphysema in his mother.   SH Adolfo reports that he has never smoked. He has never used smokeless tobacco. He reports current alcohol use of about 24.0 standard drinks of alcohol per week. He reports that he does not use drugs.   ALG Adolfo has No Known Allergies.   MED Adolfo has a current medication list which includes the following prescription(s): abilify, alprazolam, azelastine, bupropion, bupropion, ciclopirox, clonazepam, cymbalta, levothyroxine, losartan-hydrochlorothiazide 50-12.5 mg, simvastatin, and trazodone.     Review of Systems   Constitutional:  Negative for chills and fever.   HENT:  Negative for congestion and rhinorrhea.    Eyes:  Negative for visual disturbance.   Respiratory:  Negative for cough and shortness of breath.    Cardiovascular:  Negative for chest pain.   Gastrointestinal:  Negative for abdominal pain, constipation, diarrhea, nausea and vomiting.   Genitourinary:  Negative for dysuria.   Musculoskeletal:  Negative for myalgias.   Skin:  Negative for rash.   Neurological:  Negative for weakness and headaches.     Objective     /80 (BP Location: Left arm,  "Patient Position: Sitting, BP Method: Large (Manual))   Pulse (!) 59   Temp 97.4 °F (36.3 °C) (Temporal)   Resp 16   Ht 5' 10" (1.778 m)   Wt 121.9 kg (268 lb 12.8 oz)   SpO2 95%   BMI 38.57 kg/m²     Physical Exam  Vitals and nursing note reviewed.   Constitutional:       General: He is not in acute distress.     Appearance: Normal appearance. He is well-developed. He is not diaphoretic.   HENT:      Head: Normocephalic and atraumatic.      Right Ear: External ear normal.      Left Ear: External ear normal.   Eyes:      General:         Right eye: No discharge.         Left eye: No discharge.   Cardiovascular:      Rate and Rhythm: Normal rate and regular rhythm.      Heart sounds: Normal heart sounds.   Pulmonary:      Effort: Pulmonary effort is normal.      Breath sounds: Normal breath sounds. No wheezing or rales.   Skin:     General: Skin is warm and dry.   Neurological:      Mental Status: He is alert and oriented to person, place, and time. Mental status is at baseline.   Psychiatric:         Mood and Affect: Mood normal.         Behavior: Behavior normal.         Thought Content: Thought content normal.         Judgment: Judgment normal.        Assessment/Plan     1. Annual physical exam  Lipid Panel    Hemoglobin A1C      2. History of prostate cancer  Prostate Specific Antigen, Diagnostic      3. Hypothyroidism, unspecified type  Comprehensive Metabolic Panel    CBC Auto Differential    TSH      4. Essential hypertension  Lipid Panel      5. Abnormal finding of blood chemistry, unspecified  Hemoglobin A1C        Due for labs as above.  Continue current medications as prescribed.    No future appointments.      Wesly Cohen MD  Family Medicine  [facility]            "

## 2023-09-06 ENCOUNTER — LAB VISIT (OUTPATIENT)
Dept: LAB | Facility: HOSPITAL | Age: 73
End: 2023-09-06
Attending: FAMILY MEDICINE
Payer: MEDICARE

## 2023-09-06 DIAGNOSIS — R79.9 ABNORMAL FINDING OF BLOOD CHEMISTRY, UNSPECIFIED: ICD-10-CM

## 2023-09-06 DIAGNOSIS — E03.9 HYPOTHYROIDISM, UNSPECIFIED TYPE: ICD-10-CM

## 2023-09-06 DIAGNOSIS — Z85.46 HISTORY OF PROSTATE CANCER: ICD-10-CM

## 2023-09-06 DIAGNOSIS — I10 ESSENTIAL HYPERTENSION: ICD-10-CM

## 2023-09-06 DIAGNOSIS — Z00.00 ANNUAL PHYSICAL EXAM: ICD-10-CM

## 2023-09-06 LAB
ALBUMIN SERPL BCP-MCNC: 3.9 G/DL (ref 3.5–5.2)
ALP SERPL-CCNC: 68 U/L (ref 55–135)
ALT SERPL W/O P-5'-P-CCNC: 32 U/L (ref 10–44)
ANION GAP SERPL CALC-SCNC: 8 MMOL/L (ref 8–16)
AST SERPL-CCNC: 21 U/L (ref 10–40)
BASOPHILS # BLD AUTO: 0.04 K/UL (ref 0–0.2)
BASOPHILS NFR BLD: 0.6 % (ref 0–1.9)
BILIRUB SERPL-MCNC: 0.5 MG/DL (ref 0.1–1)
BUN SERPL-MCNC: 16 MG/DL (ref 8–23)
CALCIUM SERPL-MCNC: 10.1 MG/DL (ref 8.7–10.5)
CHLORIDE SERPL-SCNC: 104 MMOL/L (ref 95–110)
CHOLEST SERPL-MCNC: 259 MG/DL (ref 120–199)
CHOLEST/HDLC SERPL: 4.6 {RATIO} (ref 2–5)
CO2 SERPL-SCNC: 28 MMOL/L (ref 23–29)
COMPLEXED PSA SERPL-MCNC: <0.01 NG/ML (ref 0–4)
CREAT SERPL-MCNC: 1 MG/DL (ref 0.5–1.4)
DIFFERENTIAL METHOD: ABNORMAL
EOSINOPHIL # BLD AUTO: 0.6 K/UL (ref 0–0.5)
EOSINOPHIL NFR BLD: 7.9 % (ref 0–8)
ERYTHROCYTE [DISTWIDTH] IN BLOOD BY AUTOMATED COUNT: 12.4 % (ref 11.5–14.5)
EST. GFR  (NO RACE VARIABLE): >60 ML/MIN/1.73 M^2
ESTIMATED AVG GLUCOSE: 103 MG/DL (ref 68–131)
GLUCOSE SERPL-MCNC: 110 MG/DL (ref 70–110)
HBA1C MFR BLD: 5.2 % (ref 4–5.6)
HCT VFR BLD AUTO: 46.1 % (ref 40–54)
HDLC SERPL-MCNC: 56 MG/DL (ref 40–75)
HDLC SERPL: 21.6 % (ref 20–50)
HGB BLD-MCNC: 15.7 G/DL (ref 14–18)
IMM GRANULOCYTES # BLD AUTO: 0.03 K/UL (ref 0–0.04)
IMM GRANULOCYTES NFR BLD AUTO: 0.4 % (ref 0–0.5)
LDLC SERPL CALC-MCNC: 169 MG/DL (ref 63–159)
LYMPHOCYTES # BLD AUTO: 1.5 K/UL (ref 1–4.8)
LYMPHOCYTES NFR BLD: 21.8 % (ref 18–48)
MCH RBC QN AUTO: 32.2 PG (ref 27–31)
MCHC RBC AUTO-ENTMCNC: 34.1 G/DL (ref 32–36)
MCV RBC AUTO: 95 FL (ref 82–98)
MONOCYTES # BLD AUTO: 0.7 K/UL (ref 0.3–1)
MONOCYTES NFR BLD: 9.3 % (ref 4–15)
NEUTROPHILS # BLD AUTO: 4.2 K/UL (ref 1.8–7.7)
NEUTROPHILS NFR BLD: 60 % (ref 38–73)
NONHDLC SERPL-MCNC: 203 MG/DL
NRBC BLD-RTO: 0 /100 WBC
PLATELET # BLD AUTO: 253 K/UL (ref 150–450)
PMV BLD AUTO: 9.2 FL (ref 9.2–12.9)
POTASSIUM SERPL-SCNC: 4.4 MMOL/L (ref 3.5–5.1)
PROT SERPL-MCNC: 7.1 G/DL (ref 6–8.4)
RBC # BLD AUTO: 4.87 M/UL (ref 4.6–6.2)
SODIUM SERPL-SCNC: 140 MMOL/L (ref 136–145)
TRIGL SERPL-MCNC: 170 MG/DL (ref 30–150)
TSH SERPL DL<=0.005 MIU/L-ACNC: 3.35 UIU/ML (ref 0.4–4)
WBC # BLD AUTO: 6.98 K/UL (ref 3.9–12.7)

## 2023-09-06 PROCEDURE — 83036 HEMOGLOBIN GLYCOSYLATED A1C: CPT | Performed by: FAMILY MEDICINE

## 2023-09-06 PROCEDURE — 84153 ASSAY OF PSA TOTAL: CPT | Performed by: FAMILY MEDICINE

## 2023-09-06 PROCEDURE — 80053 COMPREHEN METABOLIC PANEL: CPT | Performed by: FAMILY MEDICINE

## 2023-09-06 PROCEDURE — 36415 COLL VENOUS BLD VENIPUNCTURE: CPT | Performed by: FAMILY MEDICINE

## 2023-09-06 PROCEDURE — 80061 LIPID PANEL: CPT | Performed by: FAMILY MEDICINE

## 2023-09-06 PROCEDURE — 84443 ASSAY THYROID STIM HORMONE: CPT | Performed by: FAMILY MEDICINE

## 2023-09-06 PROCEDURE — 85025 COMPLETE CBC W/AUTO DIFF WBC: CPT | Performed by: FAMILY MEDICINE

## 2023-09-17 ENCOUNTER — PATIENT MESSAGE (OUTPATIENT)
Dept: FAMILY MEDICINE | Facility: CLINIC | Age: 73
End: 2023-09-17
Payer: MEDICARE

## 2023-09-17 DIAGNOSIS — E78.5 HYPERLIPIDEMIA, UNSPECIFIED HYPERLIPIDEMIA TYPE: Primary | ICD-10-CM

## 2023-09-17 RX ORDER — ATORVASTATIN CALCIUM 40 MG/1
40 TABLET, FILM COATED ORAL DAILY
Qty: 90 TABLET | Refills: 3 | Status: SHIPPED | OUTPATIENT
Start: 2023-09-17 | End: 2024-09-16

## 2023-09-19 ENCOUNTER — PATIENT MESSAGE (OUTPATIENT)
Dept: FAMILY MEDICINE | Facility: CLINIC | Age: 73
End: 2023-09-19
Payer: MEDICARE

## 2023-09-19 DIAGNOSIS — I10 ESSENTIAL HYPERTENSION: ICD-10-CM

## 2023-09-19 RX ORDER — LOSARTAN POTASSIUM AND HYDROCHLOROTHIAZIDE 12.5; 5 MG/1; MG/1
1 TABLET ORAL DAILY
Qty: 90 TABLET | Refills: 0 | Status: SHIPPED | OUTPATIENT
Start: 2023-09-19 | End: 2023-12-04 | Stop reason: SDUPTHER

## 2023-09-23 ENCOUNTER — IMMUNIZATION (OUTPATIENT)
Dept: FAMILY MEDICINE | Facility: CLINIC | Age: 73
End: 2023-09-23
Payer: MEDICARE

## 2023-09-23 PROCEDURE — 99999PBSHW FLU VACCINE - QUADRIVALENT - ADJUVANTED: Mod: PBBFAC,,,

## 2023-09-23 PROCEDURE — G0008 ADMIN INFLUENZA VIRUS VAC: HCPCS | Mod: PBBFAC

## 2023-09-23 PROCEDURE — 99999PBSHW FLU VACCINE - QUADRIVALENT - ADJUVANTED: ICD-10-PCS | Mod: PBBFAC,,,

## 2023-10-10 ENCOUNTER — OFFICE VISIT (OUTPATIENT)
Dept: FAMILY MEDICINE | Facility: CLINIC | Age: 73
End: 2023-10-10
Payer: MEDICARE

## 2023-10-10 VITALS
WEIGHT: 269.63 LBS | TEMPERATURE: 98 F | OXYGEN SATURATION: 98 % | HEART RATE: 76 BPM | DIASTOLIC BLOOD PRESSURE: 82 MMHG | RESPIRATION RATE: 18 BRPM | BODY MASS INDEX: 38.6 KG/M2 | SYSTOLIC BLOOD PRESSURE: 132 MMHG | HEIGHT: 70 IN

## 2023-10-10 DIAGNOSIS — I10 ESSENTIAL HYPERTENSION: Primary | ICD-10-CM

## 2023-10-10 PROCEDURE — 99999 PR PBB SHADOW E&M-EST. PATIENT-LVL IV: CPT | Mod: PBBFAC,,, | Performed by: FAMILY MEDICINE

## 2023-10-10 PROCEDURE — 99213 PR OFFICE/OUTPT VISIT, EST, LEVL III, 20-29 MIN: ICD-10-PCS | Mod: S$PBB,,, | Performed by: FAMILY MEDICINE

## 2023-10-10 PROCEDURE — 99213 OFFICE O/P EST LOW 20 MIN: CPT | Mod: S$PBB,,, | Performed by: FAMILY MEDICINE

## 2023-10-10 PROCEDURE — 99214 OFFICE O/P EST MOD 30 MIN: CPT | Mod: PBBFAC | Performed by: FAMILY MEDICINE

## 2023-10-10 PROCEDURE — 99999 PR PBB SHADOW E&M-EST. PATIENT-LVL IV: ICD-10-PCS | Mod: PBBFAC,,, | Performed by: FAMILY MEDICINE

## 2023-10-10 RX ORDER — TEMAZEPAM 30 MG/1
30 CAPSULE ORAL NIGHTLY PRN
COMMUNITY
Start: 2023-09-21

## 2023-10-10 NOTE — PROGRESS NOTES
[facility] - Clinic Note    Subjective      Mr. Vazquez is a 72 y.o. male who presents to clinic for Follow-up    Patient has history of hypertension, left acoustic neuroma status post GS are as done on 03/09/22, prostate cancer status post surgery.  He sees a psychiatrist.  He has been doing well with his medications.  Also has a history of hypothyroidism that is controlled.    PMH Adolfo has a past medical history of Anxiety, Colon polyp, Depression, Elevated PSA, High cholesterol, Hypertension, and Prostate cancer.   PSXH Adolfo has a past surgical history that includes Prostate surgery; Colon surgery; Colonoscopy (N/A, 1/24/2019); Skin cancer excision; and Colonoscopy (N/A, 9/21/2022).    Adolfo's family history includes Cancer in his father and maternal uncle; Coronary artery disease in his maternal grandfather; Emphysema in his mother.   SH Adolfo reports that he has never smoked. He has never used smokeless tobacco. He reports current alcohol use of about 24.0 standard drinks of alcohol per week. He reports that he does not use drugs.   ALG Adolfo has No Known Allergies.   MED Adolfo has a current medication list which includes the following prescription(s): abilify, alprazolam, atorvastatin, azelastine, bupropion, bupropion, ciclopirox, clonazepam, cymbalta, levothyroxine, losartan-hydrochlorothiazide 50-12.5 mg, temazepam, and trazodone.     Review of Systems   Constitutional:  Negative for chills and fever.   HENT:  Negative for congestion and rhinorrhea.    Eyes:  Negative for visual disturbance.   Respiratory:  Negative for cough and shortness of breath.    Cardiovascular:  Negative for chest pain.   Gastrointestinal:  Negative for abdominal pain, constipation, diarrhea, nausea and vomiting.   Genitourinary:  Negative for dysuria.   Musculoskeletal:  Negative for myalgias.   Skin:  Negative for rash.   Neurological:  Negative for weakness and headaches.     Objective     /82 (BP Location: Left  "arm, Patient Position: Sitting, BP Method: Large (Manual))   Pulse 76   Temp 97.6 °F (36.4 °C) (Temporal)   Resp 18   Ht 5' 10" (1.778 m)   Wt 122.3 kg (269 lb 9.6 oz)   SpO2 98%   BMI 38.68 kg/m²     Physical Exam  Vitals and nursing note reviewed.   Constitutional:       General: He is not in acute distress.     Appearance: Normal appearance. He is well-developed. He is not diaphoretic.   HENT:      Head: Normocephalic and atraumatic.      Right Ear: External ear normal.      Left Ear: External ear normal.   Eyes:      General:         Right eye: No discharge.         Left eye: No discharge.   Cardiovascular:      Rate and Rhythm: Normal rate and regular rhythm.      Heart sounds: Normal heart sounds.   Pulmonary:      Effort: Pulmonary effort is normal.      Breath sounds: Normal breath sounds. No wheezing or rales.   Skin:     General: Skin is warm and dry.   Neurological:      Mental Status: He is alert and oriented to person, place, and time. Mental status is at baseline.   Psychiatric:         Mood and Affect: Mood normal.         Behavior: Behavior normal.         Thought Content: Thought content normal.         Judgment: Judgment normal.        Assessment/Plan     1. Essential hypertension          Continue current medications as prescribed.  Work on dietary change and exercise.    No future appointments.      Wesly Cohen MD  Family Medicine  [facility]            "

## 2023-12-04 DIAGNOSIS — I10 ESSENTIAL HYPERTENSION: ICD-10-CM

## 2023-12-04 RX ORDER — LOSARTAN POTASSIUM AND HYDROCHLOROTHIAZIDE 12.5; 5 MG/1; MG/1
1 TABLET ORAL DAILY
Qty: 90 TABLET | Refills: 0 | Status: SHIPPED | OUTPATIENT
Start: 2023-12-04 | End: 2024-03-19 | Stop reason: SDUPTHER

## 2023-12-04 NOTE — TELEPHONE ENCOUNTER
Dear Dr. Alejandro,     In the absence of Wesly Cohen MD, can you please address this patients concern or request?    Thank you,  Silvia Palmer LPN

## 2024-01-24 ENCOUNTER — PATIENT OUTREACH (OUTPATIENT)
Dept: ADMINISTRATIVE | Facility: HOSPITAL | Age: 74
End: 2024-01-24
Payer: MEDICARE

## 2024-01-24 NOTE — PROGRESS NOTES
Population Health Chart Review & Patient Outreach Details    Outreach Performed: NO    Additional Pop Health Notes:           Updates Requested / Reviewed:      Updated Care Coordination Note, Care Team Updated, and Immunizations Reconciliation Completed or Queried: [place]         Health Maintenance Topics Overdue:    Health Maintenance Due   Topic Date Due    RSV Vaccine (Age 60+ and Pregnant patients) (1 - 1-dose 60+ series) Never done    Shingles Vaccine (1 of 2) 02/24/2015    COVID-19 Vaccine (3 - 2023-24 season) 09/01/2023         Health Maintenance Topic(s) Outreach Outcomes & Actions Taken:    Provider Pt Reattribution - Outreach Outcomes & Actions Taken  : Upcoming Appt with Another Provider Already Scheduled

## 2024-01-31 ENCOUNTER — OFFICE VISIT (OUTPATIENT)
Dept: FAMILY MEDICINE | Facility: CLINIC | Age: 74
End: 2024-01-31
Payer: MEDICARE

## 2024-01-31 VITALS
HEART RATE: 76 BPM | WEIGHT: 271.63 LBS | DIASTOLIC BLOOD PRESSURE: 74 MMHG | RESPIRATION RATE: 16 BRPM | BODY MASS INDEX: 38.89 KG/M2 | TEMPERATURE: 98 F | SYSTOLIC BLOOD PRESSURE: 132 MMHG | OXYGEN SATURATION: 98 % | HEIGHT: 70 IN

## 2024-01-31 DIAGNOSIS — I10 ESSENTIAL HYPERTENSION: ICD-10-CM

## 2024-01-31 DIAGNOSIS — E66.01 SEVERE OBESITY (BMI 35.0-39.9) WITH COMORBIDITY: Primary | ICD-10-CM

## 2024-01-31 DIAGNOSIS — F33.41 MAJOR DEPRESSIVE DISORDER, RECURRENT, IN PARTIAL REMISSION: ICD-10-CM

## 2024-01-31 DIAGNOSIS — E78.5 HYPERLIPIDEMIA, UNSPECIFIED HYPERLIPIDEMIA TYPE: ICD-10-CM

## 2024-01-31 PROCEDURE — 99214 OFFICE O/P EST MOD 30 MIN: CPT | Mod: PBBFAC | Performed by: STUDENT IN AN ORGANIZED HEALTH CARE EDUCATION/TRAINING PROGRAM

## 2024-01-31 PROCEDURE — 99214 OFFICE O/P EST MOD 30 MIN: CPT | Mod: S$PBB,,, | Performed by: STUDENT IN AN ORGANIZED HEALTH CARE EDUCATION/TRAINING PROGRAM

## 2024-01-31 PROCEDURE — 99999 PR PBB SHADOW E&M-EST. PATIENT-LVL IV: CPT | Mod: PBBFAC,,, | Performed by: STUDENT IN AN ORGANIZED HEALTH CARE EDUCATION/TRAINING PROGRAM

## 2024-01-31 NOTE — PROGRESS NOTES
[facility] Note    Subjective:    The HPI and pertinent ROS is included in the Diagnostic Impression Remarks section at the end of the note. Please see below for further details. Chief complaint is at end of note.     Adolfo is a pleasant intelligent patient who is here for evaluation.     Modified Medications    No medications on file       Data reviewed 274}  Previous medical records reviewed and summarized in plan section at end of note.      If you are due for any health screening(s) below please notify me so we can arrange them to be ordered and scheduled. Most healthy patients at your age complete them, but you are free to accept or refuse. If you can't do it, I'll definitely understand. If you can, I'd certainly appreciate it!     All of your core healthy metrics are met.      The following portions of the patient's history were reviewed and updated as appropriate: allergies, current medications, past family history, past medical history, past social history, past surgical history and problem list.    He  has a past medical history of Anxiety, Colon polyp, Depression, Elevated PSA, High cholesterol, Hypertension, and Prostate cancer.  He  has a past surgical history that includes Prostate surgery; Colon surgery; Colonoscopy (N/A, 1/24/2019); Skin cancer excision; and Colonoscopy (N/A, 9/21/2022).    He  reports that he has never smoked. He has never used smokeless tobacco. He reports current alcohol use of about 24.0 standard drinks of alcohol per week. He reports that he does not use drugs.  He family history includes Cancer in his father and maternal uncle; Coronary artery disease in his maternal grandfather; Emphysema in his mother.    Review of patient's allergies indicates:  No Known Allergies    Tobacco Use: Low Risk  (1/31/2024)    Patient History     Smoking Tobacco Use: Never     Smokeless Tobacco Use: Never     Passive Exposure: Not on file     Physical Examination  General  "appearance: alert, cooperative, no distress  Neck: no thyromegaly, no neck stiffness  Lungs: clear to auscultation, no wheezes, rales or rhonchi, symmetric air entry  Heart: normal rate, regular rhythm, normal S1, S2, no murmurs, rubs, clicks or gallops  Abdomen: soft, nontender, nondistended, no rigidity, rebound, or guarding.   Back: no point tenderness over spine  Extremities: peripheral pulses normal, no unilateral leg swelling or calf tenderness   Neurological:alert, oriented, normal speech, no new focal findings or movement disorder noted from baseline    BP Readings from Last 3 Encounters:   01/31/24 132/74   10/10/23 132/82   08/16/23 128/80     Wt Readings from Last 3 Encounters:   01/31/24 123.2 kg (271 lb 9.6 oz)   10/10/23 122.3 kg (269 lb 9.6 oz)   08/16/23 121.9 kg (268 lb 12.8 oz)     /74 (BP Location: Left arm, Patient Position: Sitting, BP Method: Large (Manual))   Pulse 76   Temp 98 °F (36.7 °C) (Temporal)   Resp 16   Ht 5' 10" (1.778 m)   Wt 123.2 kg (271 lb 9.6 oz)   SpO2 98%   BMI 38.97 kg/m²    274}  Laboratory: I have reviewed old labs below:    274}    Lab Results   Component Value Date    WBC 6.98 09/06/2023    HGB 15.7 09/06/2023    HCT 46.1 09/06/2023    MCV 95 09/06/2023     09/06/2023     09/06/2023    K 4.4 09/06/2023     09/06/2023    CALCIUM 10.1 09/06/2023    CO2 28 09/06/2023     09/06/2023    BUN 16 09/06/2023    CREATININE 1.0 09/06/2023    EGFRNORACEVR >60.0 09/06/2023    ANIONGAP 8 09/06/2023    PROT 7.1 09/06/2023    ALBUMIN 3.9 09/06/2023    BILITOT 0.5 09/06/2023    ALKPHOS 68 09/06/2023    ALT 32 09/06/2023    AST 21 09/06/2023    INR 1.0 02/14/2004    CHOL 259 (H) 09/06/2023    TRIG 170 (H) 09/06/2023    HDL 56 09/06/2023    LDLCALC 169.0 (H) 09/06/2023    TSH 3.352 09/06/2023    PSA <0.01 10/18/2022    HGBA1C 5.2 09/06/2023      Lab reviewed by me: Particular labs of significance that I will monitor, workup, or treat to improve are " "mentioned below in diagnostic impression remarks.    Imaging/EKG: I have reviewed the pertinent results and my findings are noted in remarks.  274}    CC:   Chief Complaint   Patient presents with    Establish Care     refills        274}    Assessment/Plan  Adolfo Vazquez is a 73 y.o. male who presents to clinic with:  1. Severe obesity (BMI 35.0-39.9) with comorbidity    2. Major depressive disorder, recurrent, in partial remission    3. Essential hypertension    4. Hyperlipidemia, unspecified hyperlipidemia type       274}  Diagnostic Impression Remarks + HPI     Documentation entered by me for this encounter may have been done in part using speech-recognition technology. Although I have made an effort to ensure accuracy, "sound like" errors may exist and should be interpreted in context.     Severe obesity: advise diet and exercise as tolerated. Gained weight since last visit. Will follow up at next visit  MDD: following up with psychiatrist who is managing his medications  HTN: well controlled today. Agreed to do digital medicine. Continue current regimen  HLD: LDL elevated. Started on lipitor. Will recheck today. And adjust accordingly   Patient has no other complaints today. Here to establish care.     This is the extent of this pleasant patient's concerns at this present time. He did not feel chest pain upon exertion, dyspnea, nausea, vomiting, diaphoresis, or syncope. No pleuritic chest pain, unilateral leg swelling, calf tenderness, or calf pain. Negative for unintentional weight loss night sweats, hematuria, and fevers. Adolfo will return to clinic in a few months for further workup and reassessment or sooner as needed. He was instructed to call the clinic or go to the emergency department or urgent care immediately if his symptoms do not improve, worsens, or if any new symptoms develop. As we discussed that symptoms could worsen over the next 24 hours he was advised that if any increased swelling, pain, " or numbness arise to go immediately to the ED. Patient knows to call any time if an emergency arises. Shared decision making occurred and he verbalized understanding in agreement with this plan. I discussed imaging findings, diagnosis, possibilities, treatment options, medications, risks, and benefits. He had many questions regarding the options and long-term effects. All questions were answered. He expressed understanding after counseling regarding the diagnosis and recommendations. He was capable and demonstrated competence with understanding of these options. Shared decision making was performed resulting in him choosing the current treatment plan. Patient handout was given with instructions and recommendations. Advised the patient that if they become pregnant to alert us immediately to assess for medication changes. Having a healthy weight can decrease the risk of 13 cancers and is an important goal. I also discussed the importance of close follow up to discuss labs, change or modify his medications if needed, monitor side effects, and further evaluation of medical problems.     Additional workup planned: see labs ordered below.    See below for labs and meds ordered with associated diagnosis      1. Severe obesity (BMI 35.0-39.9) with comorbidity    2. Major depressive disorder, recurrent, in partial remission    3. Essential hypertension  - Hypertension Digital Medicine (HDMP) Enrollment Order    4. Hyperlipidemia, unspecified hyperlipidemia type  - Lipid Panel; Future      Roberto Pham MD   274}    If you are due for any health screening(s) below please notify me so we can arrange them to be ordered and scheduled. Most healthy patients at your age complete them, but you are free to accept or refuse.     If you can't do it, I'll definitely understand. If you can, I'd certainly appreciate it!   All of your core healthy metrics are met.

## 2024-02-01 ENCOUNTER — LAB VISIT (OUTPATIENT)
Dept: LAB | Facility: HOSPITAL | Age: 74
End: 2024-02-01
Attending: STUDENT IN AN ORGANIZED HEALTH CARE EDUCATION/TRAINING PROGRAM
Payer: MEDICARE

## 2024-02-01 DIAGNOSIS — E78.5 HYPERLIPIDEMIA, UNSPECIFIED HYPERLIPIDEMIA TYPE: ICD-10-CM

## 2024-02-01 LAB
CHOLEST SERPL-MCNC: 152 MG/DL (ref 120–199)
CHOLEST/HDLC SERPL: 3.1 {RATIO} (ref 2–5)
HDLC SERPL-MCNC: 49 MG/DL (ref 40–75)
HDLC SERPL: 32.2 % (ref 20–50)
LDLC SERPL CALC-MCNC: 85.8 MG/DL (ref 63–159)
NONHDLC SERPL-MCNC: 103 MG/DL
TRIGL SERPL-MCNC: 86 MG/DL (ref 30–150)

## 2024-02-01 PROCEDURE — 36415 COLL VENOUS BLD VENIPUNCTURE: CPT | Performed by: STUDENT IN AN ORGANIZED HEALTH CARE EDUCATION/TRAINING PROGRAM

## 2024-02-01 PROCEDURE — 80061 LIPID PANEL: CPT | Performed by: STUDENT IN AN ORGANIZED HEALTH CARE EDUCATION/TRAINING PROGRAM

## 2024-02-14 ENCOUNTER — PATIENT MESSAGE (OUTPATIENT)
Dept: FAMILY MEDICINE | Facility: CLINIC | Age: 74
End: 2024-02-14
Payer: MEDICARE

## 2024-02-16 ENCOUNTER — OFFICE VISIT (OUTPATIENT)
Dept: FAMILY MEDICINE | Facility: CLINIC | Age: 74
End: 2024-02-16
Payer: MEDICARE

## 2024-02-16 VITALS
OXYGEN SATURATION: 95 % | HEART RATE: 75 BPM | SYSTOLIC BLOOD PRESSURE: 124 MMHG | HEIGHT: 70 IN | WEIGHT: 271.38 LBS | BODY MASS INDEX: 38.85 KG/M2 | DIASTOLIC BLOOD PRESSURE: 62 MMHG

## 2024-02-16 PROCEDURE — 99213 OFFICE O/P EST LOW 20 MIN: CPT | Mod: PBBFAC | Performed by: STUDENT IN AN ORGANIZED HEALTH CARE EDUCATION/TRAINING PROGRAM

## 2024-02-16 PROCEDURE — 99999 PR PBB SHADOW E&M-EST. PATIENT-LVL III: CPT | Mod: PBBFAC,,, | Performed by: STUDENT IN AN ORGANIZED HEALTH CARE EDUCATION/TRAINING PROGRAM

## 2024-02-16 PROCEDURE — 99214 OFFICE O/P EST MOD 30 MIN: CPT | Mod: S$PBB,,, | Performed by: STUDENT IN AN ORGANIZED HEALTH CARE EDUCATION/TRAINING PROGRAM

## 2024-02-16 NOTE — PROGRESS NOTES
[facility] Note    Subjective:    The HPI and pertinent ROS is included in the Diagnostic Impression Remarks section at the end of the note. Please see below for further details. Chief complaint is at end of note.     Adolfo is a pleasant intelligent patient who is here for evaluation.     Modified Medications    No medications on file       Data reviewed 274}  Previous medical records reviewed and summarized in plan section at end of note.      If you are due for any health screening(s) below please notify me so we can arrange them to be ordered and scheduled. Most healthy patients at your age complete them, but you are free to accept or refuse. If you can't do it, I'll definitely understand. If you can, I'd certainly appreciate it!     All of your core healthy metrics are met.      The following portions of the patient's history were reviewed and updated as appropriate: allergies, current medications, past family history, past medical history, past social history, past surgical history and problem list.    He  has a past medical history of Anxiety, Colon polyp, Depression, Elevated PSA, High cholesterol, Hypertension, and Prostate cancer.  He  has a past surgical history that includes Prostate surgery; Colon surgery; Colonoscopy (N/A, 1/24/2019); Skin cancer excision; and Colonoscopy (N/A, 9/21/2022).    He  reports that he has never smoked. He has never used smokeless tobacco. He reports current alcohol use of about 24.0 standard drinks of alcohol per week. He reports that he does not use drugs.  He family history includes Cancer in his father and maternal uncle; Coronary artery disease in his maternal grandfather; Emphysema in his mother.    Review of patient's allergies indicates:  No Known Allergies    Tobacco Use: Low Risk  (2/16/2024)    Patient History     Smoking Tobacco Use: Never     Smokeless Tobacco Use: Never     Passive Exposure: Not on file     Physical Examination  General  "appearance: alert, cooperative, no distress  Neck: no thyromegaly, no neck stiffness  Lungs: clear to auscultation, no wheezes, rales or rhonchi, symmetric air entry  Heart: normal rate, regular rhythm, normal S1, S2, no murmurs, rubs, clicks or gallops  Abdomen: soft, nontender, nondistended, no rigidity, rebound, or guarding.   Back: no point tenderness over spine  Extremities: peripheral pulses normal, no unilateral leg swelling or calf tenderness   Neurological:alert, oriented, normal speech, no new focal findings or movement disorder noted from baseline    BP Readings from Last 3 Encounters:   02/16/24 124/62   01/31/24 132/74   10/10/23 132/82     Wt Readings from Last 3 Encounters:   02/16/24 123.1 kg (271 lb 6.4 oz)   01/31/24 123.2 kg (271 lb 9.6 oz)   10/10/23 122.3 kg (269 lb 9.6 oz)     /62 (BP Location: Left arm, Patient Position: Sitting, BP Method: Medium (Manual))   Pulse 75   Ht 5' 10" (1.778 m)   Wt 123.1 kg (271 lb 6.4 oz)   SpO2 95%   BMI 38.94 kg/m²    274}  Laboratory: I have reviewed old labs below:    274}    Lab Results   Component Value Date    WBC 6.98 09/06/2023    HGB 15.7 09/06/2023    HCT 46.1 09/06/2023    MCV 95 09/06/2023     09/06/2023     09/06/2023    K 4.4 09/06/2023     09/06/2023    CALCIUM 10.1 09/06/2023    CO2 28 09/06/2023     09/06/2023    BUN 16 09/06/2023    CREATININE 1.0 09/06/2023    EGFRNORACEVR >60.0 09/06/2023    ANIONGAP 8 09/06/2023    PROT 7.1 09/06/2023    ALBUMIN 3.9 09/06/2023    BILITOT 0.5 09/06/2023    ALKPHOS 68 09/06/2023    ALT 32 09/06/2023    AST 21 09/06/2023    INR 1.0 02/14/2004    CHOL 152 02/01/2024    TRIG 86 02/01/2024    HDL 49 02/01/2024    LDLCALC 85.8 02/01/2024    TSH 3.352 09/06/2023    PSA <0.01 10/18/2022    HGBA1C 5.2 09/06/2023      Lab reviewed by me: Particular labs of significance that I will monitor, workup, or treat to improve are mentioned below in diagnostic impression " "remarks.    Imaging/EKG: I have reviewed the pertinent results and my findings are noted in remarks.  274}    CC: No chief complaint on file.       274}    Assessment/Plan  Adolfo Vazquez is a 73 y.o. male who presents to clinic with:  1. BMI 38.0-38.9,adult       274}  Diagnostic Impression Remarks + HPI     Documentation entered by me for this encounter may have been done in part using speech-recognition technology. Although I have made an effort to ensure accuracy, "sound like" errors may exist and should be interpreted in context.     HTN: patient had numerous elevated readings since receiving the digital medicine bp machine. However today it was normal at 124/62 without any bp medication. Last taken bp medication was yesterday morning. Parameters given to patient and advised to continue checking bp before adminstering bp medication.   BMI: wegovy for weight loss. Education provided to patient    This is the extent of this pleasant patient's concerns at this present time. He did not feel chest pain upon exertion, dyspnea, nausea, vomiting, diaphoresis, or syncope. No pleuritic chest pain, unilateral leg swelling, calf tenderness, or calf pain. Negative for unintentional weight loss night sweats, hematuria, and fevers. Adolfo will return to clinic in a few months for further workup and reassessment or sooner as needed. He was instructed to call the clinic or go to the emergency department or urgent care immediately if his symptoms do not improve, worsens, or if any new symptoms develop. As we discussed that symptoms could worsen over the next 24 hours he was advised that if any increased swelling, pain, or numbness arise to go immediately to the ED. Patient knows to call any time if an emergency arises. Shared decision making occurred and he verbalized understanding in agreement with this plan. I discussed imaging findings, diagnosis, possibilities, treatment options, medications, risks, and benefits. He had " many questions regarding the options and long-term effects. All questions were answered. He expressed understanding after counseling regarding the diagnosis and recommendations. He was capable and demonstrated competence with understanding of these options. Shared decision making was performed resulting in him choosing the current treatment plan. Patient handout was given with instructions and recommendations. Advised the patient that if they become pregnant to alert us immediately to assess for medication changes. Having a healthy weight can decrease the risk of 13 cancers and is an important goal. I also discussed the importance of close follow up to discuss labs, change or modify his medications if needed, monitor side effects, and further evaluation of medical problems.     Additional workup planned: see labs ordered below.    See below for labs and meds ordered with associated diagnosis      1. BMI 38.0-38.9,adult  - semaglutide, weight loss, 0.25 mg/0.5 mL PnIj; Inject 0.25 mg into the skin every 7 days.  Dispense: 1 each; Refill: 0      Roberto Pham MD   274}    If you are due for any health screening(s) below please notify me so we can arrange them to be ordered and scheduled. Most healthy patients at your age complete them, but you are free to accept or refuse.     If you can't do it, I'll definitely understand. If you can, I'd certainly appreciate it!   All of your core healthy metrics are met.

## 2024-03-10 NOTE — TELEPHONE ENCOUNTER
Care Due:                  Date            Visit Type   Department     Provider  --------------------------------------------------------------------------------                                EP -                              PRIMARY      [facility] 2ND FLOOR  Last Visit: 02-      [facility]                              EP -                              PRIMARY      [facility] 2ND FLOOR  Next Visit: 07-      CARE ([facility])   [facility]                                                            Last  Test          Frequency    Reason                     Performed    Due Date  --------------------------------------------------------------------------------    HBA1C.......  6 months...  semaglutide,.............  09- 03-    [facility] Embedded Care Due Messages. Reference number: [id number].   3/09/2024 11:01:01 PM CST

## 2024-03-18 ENCOUNTER — PATIENT MESSAGE (OUTPATIENT)
Dept: FAMILY MEDICINE | Facility: CLINIC | Age: 74
End: 2024-03-18
Payer: MEDICARE

## 2024-03-18 NOTE — TELEPHONE ENCOUNTER
Pt is stating that he does not want to discontinue his Losartan.     Thank you,   Lucy Fournier MA

## 2024-03-19 DIAGNOSIS — I10 ESSENTIAL HYPERTENSION: ICD-10-CM

## 2024-03-19 RX ORDER — LOSARTAN POTASSIUM AND HYDROCHLOROTHIAZIDE 12.5; 5 MG/1; MG/1
1 TABLET ORAL DAILY
Qty: 90 TABLET | Refills: 0 | Status: SHIPPED | OUTPATIENT
Start: 2024-03-19 | End: 2024-06-03 | Stop reason: SDUPTHER

## 2024-03-19 NOTE — TELEPHONE ENCOUNTER
No care due was identified.  [facility] Embedded Care Due Messages. Reference number: [id number].   3/19/2024 4:33:24 PM CDT

## 2024-03-27 ENCOUNTER — HOSPITAL ENCOUNTER (EMERGENCY)
Facility: HOSPITAL | Age: 74
Discharge: HOME | End: 2024-03-27
Attending: EMERGENCY MEDICINE
Payer: MEDICARE

## 2024-03-27 VITALS
DIASTOLIC BLOOD PRESSURE: 78 MMHG | WEIGHT: 271 LBS | RESPIRATION RATE: 18 BRPM | HEIGHT: 70 IN | BODY MASS INDEX: 38.8 KG/M2 | SYSTOLIC BLOOD PRESSURE: 150 MMHG | HEART RATE: 74 BPM | OXYGEN SATURATION: 100 % | TEMPERATURE: 98 F

## 2024-03-27 DIAGNOSIS — R26.9 GAIT ABNORMALITY: Primary | ICD-10-CM

## 2024-03-27 DIAGNOSIS — R29.818 ACUTE FOCAL NEUROLOGICAL DEFICIT: ICD-10-CM

## 2024-03-27 LAB
ALBUMIN SERPL BCP-MCNC: 3.6 G/DL (ref 3.5–5.2)
ALP SERPL-CCNC: 63 U/L (ref 55–135)
ALT SERPL W/O P-5'-P-CCNC: 27 U/L (ref 10–44)
ANION GAP SERPL CALC-SCNC: 9 MMOL/L (ref 8–16)
AST SERPL-CCNC: 26 U/L (ref 10–40)
BASOPHILS # BLD AUTO: 0.04 K/UL (ref 0–0.2)
BASOPHILS NFR BLD: 0.6 % (ref 0–1.9)
BILIRUB SERPL-MCNC: 0.4 MG/DL (ref 0.1–1)
BUN SERPL-MCNC: 13 MG/DL (ref 8–23)
CALCIUM SERPL-MCNC: 9.6 MG/DL (ref 8.7–10.5)
CHLORIDE SERPL-SCNC: 103 MMOL/L (ref 95–110)
CHOLEST SERPL-MCNC: 147 MG/DL (ref 120–199)
CHOLEST/HDLC SERPL: 3.1 {RATIO} (ref 2–5)
CO2 SERPL-SCNC: 28 MMOL/L (ref 23–29)
CREAT SERPL-MCNC: 0.9 MG/DL (ref 0.5–1.4)
DIFFERENTIAL METHOD BLD: ABNORMAL
EOSINOPHIL # BLD AUTO: 0.3 K/UL (ref 0–0.5)
EOSINOPHIL NFR BLD: 4.2 % (ref 0–8)
ERYTHROCYTE [DISTWIDTH] IN BLOOD BY AUTOMATED COUNT: 12.7 % (ref 11.5–14.5)
EST. GFR  (NO RACE VARIABLE): >60 ML/MIN/1.73 M^2
GLUCOSE SERPL-MCNC: 95 MG/DL (ref 70–110)
HCT VFR BLD AUTO: 44 % (ref 40–54)
HDLC SERPL-MCNC: 48 MG/DL (ref 40–75)
HDLC SERPL: 32.7 % (ref 20–50)
HGB BLD-MCNC: 14.3 G/DL (ref 14–18)
IMM GRANULOCYTES # BLD AUTO: 0.04 K/UL (ref 0–0.04)
IMM GRANULOCYTES NFR BLD AUTO: 0.6 % (ref 0–0.5)
INR PPP: 1.1 (ref 0.8–1.2)
LDLC SERPL CALC-MCNC: 79.2 MG/DL (ref 63–159)
LYMPHOCYTES # BLD AUTO: 0.9 K/UL (ref 1–4.8)
LYMPHOCYTES NFR BLD: 12.9 % (ref 18–48)
MCH RBC QN AUTO: 31.8 PG (ref 27–31)
MCHC RBC AUTO-ENTMCNC: 32.5 G/DL (ref 32–36)
MCV RBC AUTO: 98 FL (ref 82–98)
MONOCYTES # BLD AUTO: 1 K/UL (ref 0.3–1)
MONOCYTES NFR BLD: 14.6 % (ref 4–15)
NEUTROPHILS # BLD AUTO: 4.8 K/UL (ref 1.8–7.7)
NEUTROPHILS NFR BLD: 67.1 % (ref 38–73)
NONHDLC SERPL-MCNC: 99 MG/DL
NRBC BLD-RTO: 0 /100 WBC
PLATELET # BLD AUTO: 201 K/UL (ref 150–450)
PMV BLD AUTO: 10.3 FL (ref 9.2–12.9)
POTASSIUM SERPL-SCNC: 3.6 MMOL/L (ref 3.5–5.1)
PROT SERPL-MCNC: 6.9 G/DL (ref 6–8.4)
PROTHROMBIN TIME: 11.4 SEC (ref 9–12.5)
RBC # BLD AUTO: 4.5 M/UL (ref 4.6–6.2)
SODIUM SERPL-SCNC: 140 MMOL/L (ref 136–145)
TRIGL SERPL-MCNC: 99 MG/DL (ref 30–150)
TSH SERPL DL<=0.005 MIU/L-ACNC: 2.97 UIU/ML (ref 0.4–4)
WBC # BLD AUTO: 7.13 K/UL (ref 3.9–12.7)

## 2024-03-27 PROCEDURE — 80061 LIPID PANEL: CPT | Performed by: EMERGENCY MEDICINE

## 2024-03-27 PROCEDURE — 85610 PROTHROMBIN TIME: CPT | Performed by: EMERGENCY MEDICINE

## 2024-03-27 PROCEDURE — 80053 COMPREHEN METABOLIC PANEL: CPT | Performed by: EMERGENCY MEDICINE

## 2024-03-27 PROCEDURE — 99285 EMERGENCY DEPT VISIT HI MDM: CPT | Mod: 25

## 2024-03-27 PROCEDURE — 85025 COMPLETE CBC W/AUTO DIFF WBC: CPT | Performed by: EMERGENCY MEDICINE

## 2024-03-27 PROCEDURE — 84443 ASSAY THYROID STIM HORMONE: CPT | Performed by: EMERGENCY MEDICINE

## 2024-03-27 NOTE — ED PROVIDER NOTES
Encounter Date: 3/27/2024       History     Chief Complaint   Patient presents with    Gait Problem     Patient presents for evaluation of gait instability and loss of balance that occurred yesterday afternoon around 3pm. Patient endorses multiple falls since yesterday afternoon but denies hitting head or LOC.      73-year-old male, history of hypertension, complaining of gait instability.  Patient states that they were flying back from [place] yesterday and when he went to stand up at the end of the flight when they had landed, he could not stand or bear any weight on his legs.  It has not clear whether he had weakness in his legs were that he was more off balance.  Wife states that patient was also confused at this point in time.  He tried to walk and fell in the airport and ultimately required a wheelchair transport to his car.  He denies any alcohol ingestion.  Patient is on multiple psychotropic medications.  He reports that today he is able to walk but needs to use a cane which is very unusual for him.  He has had a URI for the past few days.  Severe headache, no nausea or vomiting, no visual changes.  He has no chest pain or shortness of breath.  He has no numbness to his extremities.  He had 1 episode of urinary incontinence yesterday though says he has had no difficulty controlling his bladder today.    Of note, patient on multiple psychotropic medications, he is on Abilify, alprazolam, Wellbutrin, Cymbalta, temazepam and trazodone.  He states that he has been on these medications for a long time.  He did take all of these medications yesterday.  He denies accidentally taking an extra dose of 1 of the medications.    The history is provided by the patient and the spouse.     Review of patient's allergies indicates:  No Known Allergies  Past Medical History:   Diagnosis Date    Anxiety     Colon polyp     benign    Depression     Elevated PSA     High cholesterol     Hypertension     Prostate  cancer      Past Surgical History:   Procedure Laterality Date    COLON SURGERY      COLONOSCOPY N/A 1/24/2019    Procedure: COLONOSCOPY;  Surgeon: Clifton Whiting MD;  [facility]  Service: Endoscopy;  Laterality: N/A;    COLONOSCOPY N/A 9/21/2022    Procedure: COLONOSCOPY;  Surgeon: Rigo Mcmillan MD;  Location: [facility] ENDO (4TH FLR);  Service: Colon and Rectal;  Laterality: N/A;  fully vacc, instr. via e-mail, clears 4 hrs prior, pm prep -PC    PROSTATE SURGERY      SKIN CANCER EXCISION       Family History   Problem Relation Age of Onset    Cancer Father         esophageal CA    Emphysema Mother     Coronary artery disease Maternal Grandfather     Cancer Maternal Uncle      Social History     Tobacco Use    Smoking status: Never    Smokeless tobacco: Never   Substance Use Topics    Alcohol use: Yes     Alcohol/week: 24.0 standard drinks of alcohol     Types: 20 Glasses of wine, 4 Cans of beer per week     Comment: social    Drug use: No     Review of Systems    Physical Exam     Initial Vitals [03/27/24 1509]   BP Pulse Resp Temp SpO2   (!) 163/95 80 17 97.9 °F (36.6 °C) 98 %      MAP       --         Physical Exam    Nursing note and vitals reviewed.  Constitutional: Vital signs are normal. He appears well-developed and well-nourished. He is not diaphoretic.  Non-toxic appearance. He does not appear ill. No distress.   HENT:   Head: Normocephalic and atraumatic.   Mouth/Throat: Mucous membranes are normal. Mucous membranes are not dry.   Eyes: Conjunctivae and lids are normal.   Neck: Neck supple.   Normal range of motion.  Cardiovascular:  Normal rate.           2+ dorsalis pedis pulses bilaterally   Pulmonary/Chest: No respiratory distress.   Musculoskeletal:         General: No tenderness or edema.      Cervical back: Normal range of motion and neck supple.     Neurological: He is alert and oriented to person, place, and time. He has normal strength. No cranial nerve deficit or  sensory deficit. Gait abnormal. Coordination normal. GCS score is 15. GCS eye subscore is 4. GCS verbal subscore is 5. GCS motor subscore is 6.   No aphasia or dysarthria.  Patient is mildly ataxic with ambulation   Skin: Skin is warm, dry and intact. No pallor.   Psychiatric: He has a normal mood and affect. His speech is normal and behavior is normal.         ED Course   Procedures  Labs Reviewed   CBC W/ AUTO DIFFERENTIAL - Abnormal; Notable for the following components:       Result Value    RBC 4.50 (*)     MCH 31.8 (*)     Immature Granulocytes 0.6 (*)     Lymph # 0.9 (*)     Lymph % 12.9 (*)     All other components within normal limits   COMPREHENSIVE METABOLIC PANEL   PROTIME-INR   TSH   LIPID PANEL          Imaging Results              MRI Brain Without Contrast (Final result)  Result time 03/27/24 20:41:39      Final result by Clemente Vásquez MD (03/27/24 20:41:39)                   Impression:      No evidence of acute intracranial pathology.    Left internal auditory canal lesion better characterized on prior contrast enhanced exams.  Continued non emergent follow-up with contrast enhanced MRI if clinically indicated.    Electronically signed by resident: Salazar Chen  Date:    03/27/2024  Time:    20:22    Electronically signed by: Clemente Vásquez MD  Date:    03/27/2024  Time:    20:41               Narrative:    EXAMINATION:  MRI BRAIN WITHOUT CONTRAST    CLINICAL HISTORY:  Dizziness, persistent/recurrent, cardiac or vascular cause suspected;Acute focal neurological deficit;    TECHNIQUE:  Multiplanar multisequence MR imaging of the brain was performed without intravenous contrast.    COMPARISON:  CT head 03/27/2024    MRI brain 05/22/2020    FINDINGS:  Intracranial Compartment:    Generalized cerebral volume loss.  Ventricles stable in size out evidence of hydrocephalus    The brain parenchyma appears unchanged.  Mild chronic microvascular ischemic change.  No mass, hemorrhage, or recent or remote major  vascular distribution infarct.    Stable prominent extra-axial CSF space overlying the right parasagittal parietal lobe, volume loss versus arachnoid cyst.    Slightly expansile lesion in the left internal auditory canal better characterized on prior contrast enhanced exams.    No extra-axial blood or fluid collections.    Normal vascular flow voids are preserved.    Skull/Extracranial Contents (limited evaluation):    Bone marrow signal intensity is normal.    Patchy mucosal thickening in the paranasal sinuses with air-fluid level in the left maxillary antrum.  Small bilateral mastoid fluid.                                       CT Head Without Contrast (Final result)  Result time 03/27/24 18:31:45      Final result by Clemente Vásquez MD (03/27/24 18:31:45)                   Impression:      No evidence of acute intracranial pathology.  Additional evaluation, as clinically warranted.    Generalized cerebral volume loss and chronic microvascular ischemic change.    Electronically signed by resident: Salazar Chen  Date:    03/27/2024  Time:    18:15    Electronically signed by: Clemente Vásquez MD  Date:    03/27/2024  Time:    18:31               Narrative:    EXAMINATION:  CT HEAD WITHOUT CONTRAST    CLINICAL HISTORY:  Neuro deficit, acute, stroke suspected;    TECHNIQUE:  Low dose axial CT images obtained throughout the head without the use of intravenous contrast.  Axial, sagittal and coronal reconstructions were performed.    COMPARISON:  MRI brain 05/22/2023, 07/26/2022    FINDINGS:  Generalized cerebral volume loss.  Ventricles stable in size without evidence of acute hydrocephalus.    The brain parenchyma appears unchanged.  Mild patchy foci of hypoattenuation the supratentorial white matter, nonspecific but likely chronic microvascular ischemic change.  No parenchymal mass, hemorrhage, edema or major vascular distribution infarct.    No extra-axial blood or fluid collections.  Stable prominent extra-axial CSF space  in the right parasagittal parietal lobe, volume loss versus arachnoid cyst.    No acute displaced calvarial fracture.  Patchy mucosal thickening throughout the paranasal sinuses with air-fluid level in the left maxillary antrum which can be seen with acute sinusitis.  Mastoid air cells clear.                                       Medications - No data to display  Medical Decision Making  Patient's symptoms and presentation seems most c/w vertigo syndrome.  Peripheral vs central vertigo syndromes considered.  DDx for peripheral vertigo would include BPPV, vestibular neuritis, labrynthitis, meniere's disease.  For central process, posterior circulation stroke (ischemic vs hemorrhagic) or CNS mass would be greatest concern.  Polypharmacy also a consideration    While stroke is a consideration, patient is presenting greater than 24 hours after his symptom onset so a stroke code will not be activated    Plan  -labs  -CT head, followed by MRI brain      Amount and/or Complexity of Data Reviewed  External Data Reviewed: notes.  Labs: ordered. Decision-making details documented in ED Course.  Radiology: ordered and independent interpretation performed. Decision-making details documented in ED Course.    Risk  Decision regarding hospitalization.               ED Course as of 03/27/24 2055   Wed Mar 27, 2024   1511 Evaluated in triage, dizziness/imbalance starting yesterday at 3 PM. Out of window for stroke code. No focal deficits on exam. Will defer code stroke and further orders to primary team.  [SS]   2052 Labs reviewed.  No significant abnormalities.  CT head and MRI brain also without signs of a stroke or mass.  Patient has some abnormalities in his left auditory canal which seems stable from prior imaging studies.  I have discussed these with him.  On reassessment the patient he reports that his symptoms have completely resolved, he feels back to baseline, he was able to ambulate normally.  We have reviewed his  medication list, he says there was no chance that he took an extra accidental dose of 1 of his medications though I am worried this may have happened yesterday evening and or there maybe a significant polypharmacy component to the patient's symptoms yesterday.  I have offered him an admission for further workup the patient is declining admission, strongly prefers to be discharged home tonight, we did discuss strict ED return precautions and I advised him to call his PCP tomorrow to arrange close follow-up. [AS]      ED Course User Index  [AS] Thao Bloom MD  [SS] Felton Grullon MD                           Clinical Impression:  Final diagnoses:  [R29.818] Acute focal neurological deficit  [R26.9] Gait abnormality (Primary)          ED Disposition Condition    Discharge Stable          ED Prescriptions    None       Follow-up Information       Follow up With Specialties Details Why Contact Info    Roberto Pham MD Family Medicine Call in 1 day  [address] [facility] [address]  [phone]      [facility] - Emergency Dept Emergency Medicine  If symptoms worsen [address] [facility] [address] [phone]             Thao Bloom MD  03/27/24 [id number]

## 2024-03-27 NOTE — ED NOTES
Assumed care of the patient. Pt placed on continuous cardiac monitoring, continuous pulse oximetry, and automatic BP cuff cycling Q30min. Pt in hospital gown, side rails up X2, bed low and locked, and call light is placed within reach. One family/visitors at bedside at this time. Pt denies any complaints or needs.

## 2024-03-27 NOTE — ED TRIAGE NOTES
Pt presents to ED with c/o altered gait/ loss of balance yesterday. Pt endorses having several falls since yesterday. -injury/hitting ehad. -LOC.   
5

## 2024-03-28 ENCOUNTER — TELEPHONE (OUTPATIENT)
Dept: NEUROSURGERY | Facility: CLINIC | Age: 74
End: 2024-03-28
Payer: MEDICARE

## 2024-03-28 ENCOUNTER — PATIENT OUTREACH (OUTPATIENT)
Dept: EMERGENCY MEDICINE | Facility: HOSPITAL | Age: 74
End: 2024-03-28
Payer: MEDICARE

## 2024-03-28 NOTE — TELEPHONE ENCOUNTER
----- Message from Yahir Hernandez sent at 3/28/2024  1:11 PM CDT -----  Regarding: pt advice  Contact: pt @ [phone]  Ms. Manzo from ED dept is calling to try to get pt scheduled from a referral for  D33.3 (ICD-10-CM) - Left acoustic neuroma; please call to advise. Pt DC on 03/27.  Thank you for all you are doing.

## 2024-03-28 NOTE — PROGRESS NOTES
Spoke with Pt regarding his recent ED visit for gait problems. Pt states he is still feeling somewhat weak at the time. He is scheduled for a PCP f/u 7-31-24. Call Placed to Dr. Monteiro per ED  Navigation to assist with scheduling a Neurosurgery appt. Not able to access in Epic. Message left to call the Pt back directly. Pt is aware. ED Navigator will continue to f/u.

## 2024-04-01 ENCOUNTER — TELEPHONE (OUTPATIENT)
Dept: NEUROSURGERY | Facility: CLINIC | Age: 74
End: 2024-04-01
Payer: MEDICARE

## 2024-04-03 ENCOUNTER — OFFICE VISIT (OUTPATIENT)
Dept: FAMILY MEDICINE | Facility: CLINIC | Age: 74
End: 2024-04-03
Payer: MEDICARE

## 2024-04-03 ENCOUNTER — DOCUMENTATION ONLY (OUTPATIENT)
Dept: FAMILY MEDICINE | Facility: CLINIC | Age: 74
End: 2024-04-03
Payer: MEDICARE

## 2024-04-03 VITALS
HEIGHT: 70 IN | OXYGEN SATURATION: 97 % | DIASTOLIC BLOOD PRESSURE: 78 MMHG | WEIGHT: 266.81 LBS | BODY MASS INDEX: 38.2 KG/M2 | HEART RATE: 75 BPM | SYSTOLIC BLOOD PRESSURE: 138 MMHG

## 2024-04-03 DIAGNOSIS — R42 VERTIGO DUE TO BRAIN INJURY: ICD-10-CM

## 2024-04-03 DIAGNOSIS — S06.9XAA VERTIGO DUE TO BRAIN INJURY: ICD-10-CM

## 2024-04-03 DIAGNOSIS — I10 PRIMARY HYPERTENSION: ICD-10-CM

## 2024-04-03 DIAGNOSIS — E66.01 SEVERE OBESITY (BMI 35.0-39.9) WITH COMORBIDITY: Primary | ICD-10-CM

## 2024-04-03 PROCEDURE — 99214 OFFICE O/P EST MOD 30 MIN: CPT | Mod: S$PBB,,, | Performed by: STUDENT IN AN ORGANIZED HEALTH CARE EDUCATION/TRAINING PROGRAM

## 2024-04-03 PROCEDURE — 99999 PR PBB SHADOW E&M-EST. PATIENT-LVL III: CPT | Mod: PBBFAC,,, | Performed by: STUDENT IN AN ORGANIZED HEALTH CARE EDUCATION/TRAINING PROGRAM

## 2024-04-03 PROCEDURE — 99213 OFFICE O/P EST LOW 20 MIN: CPT | Mod: PBBFAC | Performed by: STUDENT IN AN ORGANIZED HEALTH CARE EDUCATION/TRAINING PROGRAM

## 2024-04-03 RX ORDER — CLOBETASOL PROPIONATE 0.5 MG/G
OINTMENT TOPICAL 2 TIMES DAILY
COMMUNITY
Start: 2024-03-01

## 2024-04-03 RX ORDER — TIRZEPATIDE 2.5 MG/.5ML
2.5 INJECTION, SOLUTION SUBCUTANEOUS
Qty: 4 PEN | Refills: 0 | Status: SHIPPED | OUTPATIENT
Start: 2024-04-03

## 2024-04-03 NOTE — PROGRESS NOTES
[facility] Note    Subjective:    The HPI and pertinent ROS is included in the Diagnostic Impression Remarks section at the end of the note. Please see below for further details. Chief complaint is at end of note.     Adolfo is a pleasant intelligent patient who is here for evaluation.     Modified Medications    No medications on file       Data reviewed 274}  Previous medical records reviewed and summarized in plan section at end of note.      If you are due for any health screening(s) below please notify me so we can arrange them to be ordered and scheduled. Most healthy patients at your age complete them, but you are free to accept or refuse. If you can't do it, I'll definitely understand. If you can, I'd certainly appreciate it!     All of your core healthy metrics are met.      The following portions of the patient's history were reviewed and updated as appropriate: allergies, current medications, past family history, past medical history, past social history, past surgical history and problem list.    He  has a past medical history of Anxiety, Colon polyp, Depression, Elevated PSA, High cholesterol, Hypertension, and Prostate cancer.  He  has a past surgical history that includes Prostate surgery; Colon surgery; Colonoscopy (N/A, 1/24/2019); Skin cancer excision; and Colonoscopy (N/A, 9/21/2022).    He  reports that he has never smoked. He has never used smokeless tobacco. He reports current alcohol use of about 24.0 standard drinks of alcohol per week. He reports that he does not use drugs.  He family history includes Cancer in his father and maternal uncle; Coronary artery disease in his maternal grandfather; Emphysema in his mother.    Review of patient's allergies indicates:  No Known Allergies    Tobacco Use: Low Risk  (3/27/2024)    Patient History     Smoking Tobacco Use: Never     Smokeless Tobacco Use: Never     Passive Exposure: Not on file     Physical Examination  General  "appearance: alert, cooperative, no distress  Neck: no thyromegaly, no neck stiffness  Lungs: clear to auscultation, no wheezes, rales or rhonchi, symmetric air entry  Heart: normal rate, regular rhythm, normal S1, S2, no murmurs, rubs, clicks or gallops  Abdomen: soft, nontender, nondistended, no rigidity, rebound, or guarding.   Back: no point tenderness over spine  Extremities: peripheral pulses normal, no unilateral leg swelling or calf tenderness   Neurological:alert, oriented, normal speech, no new focal findings or movement disorder noted from baseline    BP Readings from Last 3 Encounters:   04/03/24 138/78   03/27/24 (!) 150/78   02/16/24 124/62     Wt Readings from Last 3 Encounters:   04/03/24 121 kg (266 lb 12.8 oz)   03/27/24 122.9 kg (271 lb)   02/16/24 123.1 kg (271 lb 6.4 oz)     /78 (BP Location: Left arm, Patient Position: Sitting, BP Method: Large (Manual))   Pulse 75   Ht 5' 10" (1.778 m)   Wt 121 kg (266 lb 12.8 oz)   SpO2 97%   BMI 38.28 kg/m²    274}  Laboratory: I have reviewed old labs below:    274}    Lab Results   Component Value Date    WBC 7.13 03/27/2024    HGB 14.3 03/27/2024    HCT 44.0 03/27/2024    MCV 98 03/27/2024     03/27/2024     03/27/2024    K 3.6 03/27/2024     03/27/2024    CALCIUM 9.6 03/27/2024    CO2 28 03/27/2024    GLU 95 03/27/2024    BUN 13 03/27/2024    CREATININE 0.9 03/27/2024    EGFRNORACEVR >60.0 03/27/2024    ANIONGAP 9 03/27/2024    PROT 6.9 03/27/2024    ALBUMIN 3.6 03/27/2024    BILITOT 0.4 03/27/2024    ALKPHOS 63 03/27/2024    ALT 27 03/27/2024    AST 26 03/27/2024    INR 1.1 03/27/2024    CHOL 147 03/27/2024    TRIG 99 03/27/2024    HDL 48 03/27/2024    LDLCALC 79.2 03/27/2024    TSH 2.973 03/27/2024    PSA <0.01 10/18/2022    HGBA1C 5.2 09/06/2023      Lab reviewed by me: Particular labs of significance that I will monitor, workup, or treat to improve are mentioned below in diagnostic impression remarks.    Imaging/EKG: I " "have reviewed the pertinent results and my findings are noted in remarks.  274}    CC:   Chief Complaint   Patient presents with    Follow-up     F/u post ER visit, possible TIA from ED  Weak, low energy, gait is shaky    Cough     Straining cough since plan travel before Ed visit        274}    Assessment/Plan  Adolfo Vazquez is a 73 y.o. male who presents to clinic with:  1. Severe obesity (BMI 35.0-39.9) with comorbidity    2. Primary hypertension    3. 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 Although I have made an effort to ensure accuracy, "sound like" errors may exist and should be interpreted in context.      Patient presented to the ED d/t having sudden LE paralysis  Patient was arriving back from [place] seas trip but unable to stand up on the airplane  Patient require wheelchair to exit the hospital  Patient had CT and MRI in the ED which was unremarkable  Patient had discussion with his neurosurgeon which recommend to follow up w/ PCP   Patient centered discussion reviewed no further work up with either echo or cta head/neck since patient is improving  Options were discuss and patient declined further work up and opted for vestibular rehab  Patient given strict return precautions if symptoms worsening    Additional workup planned: see labs ordered below.    See below for labs and meds ordered with associated diagnosis      1. Severe obesity (BMI 35.0-39.9) with comorbidity  - tirzepatide, weight loss, (ZEPBOUND) 2.5 mg/0.5 mL PnIj; Inject 2.5 mg into the skin every 7 days.  Dispense: 4 Pen; Refill: 0    2. Primary hypertension  - tirzepatide, weight loss, (ZEPBOUND) 2.5 mg/0.5 mL PnIj; Inject 2.5 mg into the skin every 7 days.  Dispense: 4 Pen; Refill: 0    3. 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 Most healthy patients at your age complete them, but you are free to accept or refuse.     If you can't do it, I'll definitely understand. If you can, I'd certainly appreciate it!   All of your core healthy metrics are met.

## 2024-04-08 ENCOUNTER — PATIENT MESSAGE (OUTPATIENT)
Dept: FAMILY MEDICINE | Facility: CLINIC | Age: 74
End: 2024-04-08
Payer: MEDICARE

## 2024-04-08 DIAGNOSIS — G45.9 TIA (TRANSIENT ISCHEMIC ATTACK): Primary | ICD-10-CM

## 2024-04-18 ENCOUNTER — OFFICE VISIT (OUTPATIENT)
Dept: SLEEP MEDICINE | Facility: CLINIC | Age: 74
End: 2024-04-18
Payer: MEDICARE

## 2024-04-18 DIAGNOSIS — G47.33 SEVERE OBSTRUCTIVE SLEEP APNEA: Primary | ICD-10-CM

## 2024-04-18 DIAGNOSIS — G47.09 OTHER INSOMNIA: ICD-10-CM

## 2024-04-18 PROCEDURE — 99214 OFFICE O/P EST MOD 30 MIN: CPT | Mod: 95,,, | Performed by: INTERNAL MEDICINE

## 2024-04-18 NOTE — PROGRESS NOTES
The patient location is: [place]  The chief complaint leading to consultation is: trouble with sleep    Visit type: audiovisual    20 minutes of total time spent on the encounter, which includes face to face time and non-face to face time preparing to see the patient (eg, review of tests), Obtaining and/or reviewing separately obtained history, Documenting clinical information in the electronic or other health record, Independently interpreting results (not separately reported) and communicating results to the patient/family/caregiver, or Care coordination (not separately reported).     Each patient to whom he or she provides medical services by telemedicine is:  (1) informed of the relationship between the physician and patient and the respective role of any other health care provider with respect to management of the patient; and (2) notified that he or she may decline to receive medical services by telemedicine and may withdraw from such care at any time.    Referred by No ref. provider found     FOLLOW-UP VISIT    Adolfo Vazquez  is a pleasant 73 y.o. male with depression, hypertension, erectile dysfunction, s/p prostatectomy who presented November 2020 for evaluation of witnessed apneas by his wife    Here today for:  follow-up     Since last visit:   See assessment below      There were no vitals filed for this visit.  Physical Exam:  GEN:   Well-appearing, vitals reviewed  SKIN:  No rash on the face or bridge of the nose  EYES:  No icterus, pupils equal      RECORDS REVIEWED TODAY:    CPAP titraiton 5/13/21: 5cwp ok supine SWS, 9cwp lat NREM, 10cwp lat REM, no supine REM, rec 9-12, auto ramp =5cwp    PMH significant for depression, hypertension, erectile dysfunction, s/p prostatectomy who presented November 2020     Presenting problems:   CPAP intolerance,  recent TIA     PROBLEM DESCRIPTION/ Sx on Presentation Interval Hx STATUS PLAN     Severe  MAU     less snoring, no witnessed  apneas    Similar  weight  PAP history   Dx Study HST 12.17.2020: AHI 59, RDI 74, 9.6% <90%   Bed partner    Position    Mask FFM (fit pretty well)   Pressure    DME [facility] [place]   [employer]    Machine age 2021 (returned)   Extras    PAP alternatives    PROBLEMS                Had a TIA a few weeks ago    Some trouble falling asleep with it        uncontrolled       -will need inpatient study to requalify for PAP therapy  -will proceed with CPAP titration             Daytime Sx     + mild sleepiness when inactive , dozing occasionally    ESS 3/24 on intake   Dozing on occasion  No severe sleepiness   New      Insomnia     SLEEP SCHEDULE   [place]    Environment    CBTi    Prior meds    Current meds Temazepam  Trazodone 50mg    Bed Time 10P   Latency 15-30min   Arousals 3   Nocturia 2   Back to sleep 15min   Stimulus ctrl    Wake time 7-8A   Caffeine    Naps    Work Retired                New   -will reassess after management of MAU  limited efficacy and possible side effects of chronic hypnotic use      -    -hypnotics prescribed by his primary     Nocturia     x 1-2 per sleep period   1-2   New        Other issues:     RTC:  31-90 days after receiving new machine     4/18/2024 Toy  FU: -order new machine (standard) after study   Rx:

## 2024-04-22 ENCOUNTER — TELEPHONE (OUTPATIENT)
Dept: SLEEP MEDICINE | Facility: OTHER | Age: 74
End: 2024-04-22
Payer: MEDICARE

## 2024-05-07 ENCOUNTER — TELEPHONE (OUTPATIENT)
Dept: SLEEP MEDICINE | Facility: OTHER | Age: 74
End: 2024-05-07
Payer: MEDICARE

## 2024-05-07 ENCOUNTER — HOSPITAL ENCOUNTER (OUTPATIENT)
Dept: SLEEP MEDICINE | Facility: OTHER | Age: 74
Discharge: HOME | End: 2024-05-07
Attending: INTERNAL MEDICINE
Payer: MEDICARE

## 2024-05-07 DIAGNOSIS — G47.09 OTHER INSOMNIA: ICD-10-CM

## 2024-05-07 DIAGNOSIS — G47.33 SEVERE OBSTRUCTIVE SLEEP APNEA: ICD-10-CM

## 2024-05-07 PROCEDURE — 95811 POLYSOM 6/>YRS CPAP 4/> PARM: CPT

## 2024-05-08 ENCOUNTER — PATIENT MESSAGE (OUTPATIENT)
Dept: ADMINISTRATIVE | Facility: OTHER | Age: 74
End: 2024-05-08
Payer: MEDICARE

## 2024-05-08 ENCOUNTER — OFFICE VISIT (OUTPATIENT)
Dept: NEUROLOGY | Facility: CLINIC | Age: 74
End: 2024-05-08
Payer: MEDICARE

## 2024-05-08 ENCOUNTER — LAB VISIT (OUTPATIENT)
Dept: LAB | Facility: HOSPITAL | Age: 74
End: 2024-05-08
Attending: PSYCHIATRY & NEUROLOGY
Payer: MEDICARE

## 2024-05-08 VITALS
HEIGHT: 70 IN | DIASTOLIC BLOOD PRESSURE: 72 MMHG | HEART RATE: 65 BPM | BODY MASS INDEX: 38.31 KG/M2 | WEIGHT: 267.63 LBS | SYSTOLIC BLOOD PRESSURE: 135 MMHG

## 2024-05-08 DIAGNOSIS — G45.9 TIA (TRANSIENT ISCHEMIC ATTACK): Primary | ICD-10-CM

## 2024-05-08 DIAGNOSIS — G45.9 TIA (TRANSIENT ISCHEMIC ATTACK): ICD-10-CM

## 2024-05-08 DIAGNOSIS — Z86.73 HISTORY OF TIA (TRANSIENT ISCHEMIC ATTACK): ICD-10-CM

## 2024-05-08 LAB
CREAT SERPL-MCNC: 1.1 MG/DL (ref 0.5–1.4)
EST. GFR  (NO RACE VARIABLE): >60 ML/MIN/1.73 M^2

## 2024-05-08 PROCEDURE — 99999 PR PBB SHADOW E&M-EST. PATIENT-LVL IV: CPT | Mod: PBBFAC,,, | Performed by: PSYCHIATRY & NEUROLOGY

## 2024-05-08 PROCEDURE — 36415 COLL VENOUS BLD VENIPUNCTURE: CPT | Performed by: PSYCHIATRY & NEUROLOGY

## 2024-05-08 PROCEDURE — 99204 OFFICE O/P NEW MOD 45 MIN: CPT | Mod: S$PBB,,, | Performed by: PSYCHIATRY & NEUROLOGY

## 2024-05-08 PROCEDURE — 99214 OFFICE O/P EST MOD 30 MIN: CPT | Mod: PBBFAC | Performed by: PSYCHIATRY & NEUROLOGY

## 2024-05-08 PROCEDURE — 82565 ASSAY OF CREATININE: CPT | Performed by: PSYCHIATRY & NEUROLOGY

## 2024-05-08 RX ORDER — ASPIRIN 81 MG/1
81 TABLET ORAL DAILY
COMMUNITY
Start: 2024-05-08

## 2024-05-08 NOTE — PROGRESS NOTES
Vascular Neurology  Clinic Note    ___________________  ASSESSMENT & PLAN  73 y.o. 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 Monteiro however there does not seem to be any documentation. Had been seen in the ED for neurological symptoms that were felt to be medication induced (3/27/24)    Transient Neurological Symptoms  Overall quite atypical for an ischemic event given the duration, lack of laterality, component of hallucination (not consistent w/ peduncular hallucinosis). To be cautious, TIA remains on DDx  CTA H*N t/o r/o vascular disease (jesika. Vertebrobasilar)  Continue lipitor  On digital HTN, still 130-140's, continue  Has been on asa 81 mg daily, will continue this.    Visit Diagnoses:  1. TIA (transient ischemic attack)    2. History of TIA (transient ischemic attack)       Orders Placed This Encounter    CTA Head and Neck (xpd)    Creatinine, serum    Echo    aspirin (ECOTRIN) 81 MG EC tablet      I have spent a total of 45 minutes in chart review, face-to-face encounter, laboratory and/or imaging review and interpretation and documentation for this patient.  ____________________________  HPI  Was on a flight from [facility], suddenly was disoriented and confused, imbalanced, couldn't move his feet, was placed in a wheelchair and he wasn't able to express himself, he eventually made it to his connection, when got off the flight, he had a fall at the cab stand, wasn't able to move either foot. Had some visual hallucination that his legs were curling up but otherwise no other hallucinations. Eventually made it home, went to bed and woke up next day. Finally went to the ED the next day but most of his symptoms had resolved. He has baseline imbalance from neuroma and it has been a little bit worse since then.  Brain Imaging:  MRI brain  Impression:     No evidence of acute intracranial  "pathology.     Left internal auditory canal lesion better characterized on prior contrast enhanced exams.  Continued non emergent follow-up with contrast enhanced MRI if clinically indicated.  Vessel Imaging:    Cardiac Evaluation:    Other:     Relevant Labwork:  Recent Labs   Lab 09/06/23  0816 02/01/24  0904 03/27/24  1708   Hemoglobin A1C 5.2  --   --    LDL Cholesterol 169.0 H   < > 79.2   HDL 56   < > 48   Triglycerides 170 H   < > 99   Cholesterol 259 H   < > 147    < > = values in this interval not displayed.       I independently viewed the above imaging studies in addition to reviewing the report.  I reviewed the above labwork.    Vital Signs:      8/16/2023     9:13 AM 10/10/2023    11:15 AM 1/31/2024     8:29 AM 2/16/2024     9:01 AM 3/27/2024     3:09 PM 4/3/2024     2:43 PM 5/8/2024    10:16 AM   Vitals - 1 value per visit   SYSTOLIC 128 132 132 124 163 138 135   DIASTOLIC 80 82 74 62 95 78 72   Pulse 59 76 76 75 80 75 65   Temp 97.4 °F (36.3 °C) 97.6 °F (36.4 °C) 98 °F (36.7 °C)  97.9 °F (36.6 °C)     Resp 16 18 16  17     SPO2 95 % 98 % 98 % 95 % 98 % 97 %    Weight (lb) 268.8 269.6 271.6 271.4 271 266.8 267.64   Weight (kg) 121.927 122.29 123.197 123.106 122.925 121.02 121.4   Height 5' 10" (1.778 m) 5' 10" (1.778 m) 5' 10" (1.778 m) 5' 10" (1.778 m) 5' 10" (1.778 m) 5' 10" (1.778 m) 5' 10" (1.778 m)   BMI (Calculated) 38.6 38.7 39 38.9 38.9 38.3 38.4   Pain Score Zero Zero Zero Zero  Zero Zero       Past Medical History  Past Medical History:   Diagnosis Date    Anxiety     Colon polyp     benign    Depression     Elevated PSA     High cholesterol     Hypertension     Prostate cancer      Current Outpatient Medications   Medication Instructions    ABILIFY 2 mg, Daily    ALPRAZolam (XANAX) 0.5 mg, 2 times daily PRN    aspirin (ECOTRIN) 81 mg, Oral, Daily    atorvastatin (LIPITOR) 40 mg, Oral, Daily    azelastine (ASTELIN) 137 mcg (0.1 %) nasal spray 1 spray, Daily    buPROPion (WELLBUTRIN XL) 300 MG " 24 hr tablet No dose, route, or frequency recorded.    buPROPion (WELLBUTRIN XL) 150 mg, Oral, Every morning    ciclopirox (LOPROX) 0.77 % Crea Topical (Top), 2 times daily    clobetasol 0.05% (TEMOVATE) 0.05 % Oint Topical (Top), 2 times daily    CYMBALTA 60 mg, Daily    levothyroxine (SYNTHROID) 100 mcg, Oral, Every morning    losartan-hydrochlorothiazide 50-12.5 mg (HYZAAR) 50-12.5 mg per tablet 1 tablet, Oral, Daily    temazepam (RESTORIL) 30 mg, Oral, Nightly PRN    traZODone (DESYREL) 100 mg, Nightly PRN    ZEPBOUND 2.5 mg, Subcutaneous, Every 7 days        Social History  Social History     Socioeconomic History    Marital status:    Tobacco Use    Smoking status: Never    Smokeless tobacco: Never   Substance and Sexual Activity    Alcohol use: Yes     Alcohol/week: 24.0 standard drinks of alcohol     Types: 20 Glasses of wine, 4 Cans of beer per week     Comment: social    Drug use: No    Sexual activity: Yes     Partners: Female     Social Determinants of Health     Financial Resource Strain: Low Risk  (3/28/2024)    Overall Financial Resource Strain (CARDIA)     Difficulty of Paying Living Expenses: Not hard at all   Food Insecurity: No Food Insecurity (2/16/2024)    Hunger Vital Sign     Worried About Running Out of Food in the Last Year: Never true     Ran Out of Food in the Last Year: Never true   Transportation Needs: No Transportation Needs (3/28/2024)    PRAPARE - Transportation     Lack of Transportation (Medical): No     Lack of Transportation (Non-Medical): No   Physical Activity: Unknown (2/16/2024)    Exercise Vital Sign     Days of Exercise per Week: 2 days   Stress: Stress Concern Present (2/16/2024)    [employer] - Occupational Stress Questionnaire     Feeling of Stress : To some extent   Housing Stability: Low Risk  (2/16/2024)    Housing Stability Vital Sign     Unable to Pay for Housing in the Last Year: No     Number of Places Lived in the Last Year: 1      Unstable Housing in the Last Year: Stephanie Jeff MD  Vascular Neurology  Office [phone]

## 2024-05-08 NOTE — PROGRESS NOTES
A Cpap titration was preformed on 73 year old  Adolfo Vazquez on the night of 05/07/2024. The procedure was explained to the patient which included he entire sleep study process, the placement of all wires and why the tech would need to enter the room. All questions were asked and answered prior to the start of the study. The mask used for the night was a ResMed AirFit F20 size medium. Disposable equipment was used where applicable. An end of the night instruction sheet was giving to the patient upon leaving the lab. The patient's response to cpap at the end of the night was that he did not like it.

## 2024-05-17 ENCOUNTER — HOSPITAL ENCOUNTER (OUTPATIENT)
Dept: RADIOLOGY | Facility: HOSPITAL | Age: 74
Discharge: HOME | End: 2024-05-17
Attending: PSYCHIATRY & NEUROLOGY
Payer: MEDICARE

## 2024-05-17 DIAGNOSIS — G45.9 TIA (TRANSIENT ISCHEMIC ATTACK): ICD-10-CM

## 2024-05-17 PROCEDURE — 70498 CT ANGIOGRAPHY NECK: CPT | Mod: 26,,, | Performed by: RADIOLOGY

## 2024-05-17 PROCEDURE — 70496 CT ANGIOGRAPHY HEAD: CPT | Mod: 26,,, | Performed by: RADIOLOGY

## 2024-05-17 PROCEDURE — 25500020 PHARM REV CODE 255: Performed by: PSYCHIATRY & NEUROLOGY

## 2024-05-17 PROCEDURE — 70496 CT ANGIOGRAPHY HEAD: CPT | Mod: TC

## 2024-05-17 RX ADMIN — IOHEXOL 100 ML: 350 INJECTION, SOLUTION INTRAVENOUS at 02:05

## 2024-05-19 NOTE — PROCEDURES
"[facility]    Titration Interpretation Report    Patient Name:  KERRY KHAN  MRN#:  [id number]  :  1950  Study Date:  2024  Referring Provider:  ALBERTO CASTAÑEDA MD    The patient is a 73 year old Male who is 5' 10" and weighs 266.0 lbs.  His BMI equals 38.5.  A full night PAP titration was performed.    Polysomnogram Data  A full night polysomnogram recorded the standard physiologic parameters including EEG, EOG, EMG, EKG, nasal and oral airflow.  Respiratory parameters of chest and abdominal movements were recorded with (RIP) Respiratory Inductance Plethsmography.  Oxygen saturation was recorded by pulse oximetry.    Titration Summary  The patient was titrated at pressures ranging from 5* cm/H20 with supplemental oxygen at - up to 10* cm/H20 with supplemental oxygen at -.  The last pressure used in the study was 10* cm/H20 with supplemental oxygen at -.    Sleep Architecture  The total recording time of the polysomnogram was 432.2 minutes.  The total sleep time was 300.0 minutes.  The patient spent 8.2% of total sleep time in Stage N1, 81.8% in Stage N2, 0.0% in Stages N3, and 10.0% in REM.  Sleep latency was 61.4 minutes.  REM latency was 160.5 minutes.  Sleep Efficiency was 69.4%.  Total wake time was 132.5 minutes for a total wake percentage of 18.7%.  Wake after Sleep Onset was 70.5 minutes.    Respiratory Summary  The polysomnogram revealed a presence of - obstructive, 6 central, and - mixed apneas resulting in Total Apnea index of 1.2 events per hour.  There were 4 hypopneas resulting in Total Hypopnea index of 0.8 events per hour.  The combined Apnea/Hypopnea index was 2.0 events per hour.  There were a total of 2 RERA events resulting in a Respiratory Disturbance Index (RDI) of 2.4 events per hour.     Mean oxygen saturation was 92.4%.  The lowest oxygen saturation during sleep was 86.0%.  Time spent ?88% oxygen saturation was 2.4 minutes (0.6%).    Limb Movement " "Activity  There were 9 limb movements recorded.  Of this total, 9 were classified as PLMs.  Of the PLMs, - were associated with arousals.  The Limb Movement index was 1.8 per hour while the PLM index was 1.8 per hour and PLM with arousals index was - per hour.     Cardiac: single lead EKG revealed normal sinus rhythm     PAP titration:    Mask used in study: ResMed AirFit F20 size medium  with no significant leaks   CPAP = 9 cwp was largely effective in lateral position in stage N2 sleep  CPAP = 9 cwp was largely effective in lateral position in stage REM sleep  CPAP = 10 cwp was largely effective in supine position in stage N2 sleep  CPAP = 10 cwp was partially effective in supine position in stage REM sleep    Oxygenation:  At therapeutic levels of PAP therapy, there was no baseline hypoxemia.    Impression:  -obstructive sleep apnea       Recommendations:  -initial auto-CPAP settings CPAP min = 10 cwp  and CPAP max =  12 cwp  -if the patient complains of sleep disruption, CPAP min should be adjusted to 11cwp or higher depending on pressure tolerance  -ramp = 7 cwp or higher to achieve sleep onset   -the patient has follow up with Sleep Medicine        Olegario Castañeda MD    (This Sleep Study was interpreted by a Board Certified Sleep Specialist who conducted an epoch-by-epoch review of the entire raw data recording.)  (The indication for this sleep study was reviewed and deemed appropriate by AASM Practice Parameters or other reasons by a Board Certified Sleep Specialist.)[facility]    Titration Report    Patient Name: KERRY KHAN Study Date: 5/7/2024   YOB: 1950 MRN #: [id number]   Age: 73 year AYALA #: [id number]   Sex: Male Referring Provider: OLEGARIO CASTAÑEDA MD   Height: 5' 10" Recording Tech: Kan Bradley RRT RPSGT   Weight: 266.0 lbs Scoring Tech: Bryce Chris RRT RPSGT   BMI: 38.5 Interpreting Physician: -   ESS: - Neck Circumference: -     Study Overview    Lights Off: " 10:10:22 PM  Count Index   Lights On: 05:22:32 AM Awakenings: 38 7.6   Time in Bed: 432.2 min. Arousals: 46 9.2   Total Sleep Time: 300.0 min. Apneas & Hypopneas: 10 2.0    Sleep Efficiency: 69.4% Limb Movements: 9 1.8   Sleep Latency: 61.4 min. Snores: - -   Wake After Sleep Onset: 70.5 min. Desaturations: 12 2.4    REM Latency from Sleep Onset: 160.5 min. Minimum SpO2 TST: 86.0%      Sleep Architecture   % of Time in Bed  Stages Time (mins) % Sleep Time   Wake 132.5    Stage N1 24.5 8.2%   Stage N2 245.5 81.8%   Stage N3 0.0 0.0%   REM 30.0 10.0%         Arousal Summary     NREM REM Sleep Index   Respiratory Arousals 1 - 1 0.2   PLM Arousals - - - -   Isolated Limb Movement Arousals - - - -   Spontaneous Arousals 43 2 45 9.0   Total 44 2 46 9.2       Limb Movement Summary     Count Index   Isolated Limb Movements - -   Periodic Limb Movements (PLMs) 9 1.8   Total Limb Movements 9 1.8   Respiratory Summary     By Sleep Stage By Body Position Total    NREM REM Supine Non-Supine    Time (min) 270.0 30.0 217.5 82.5 300.0           Obstructive Apnea - - - - -   Mixed Apnea - - - - -   Central Apnea 6 - 6 - 6   Central Apnea Index 1.3 - 1.8 - 1.3   Total Apneas 6 - 6 - 6   Total Apnea Index 1.3 - 1.7 - 1.2           Total Hypopnea 2 2 3 1 4   Total Hypopnea Index 0.4 4.0 0.8 0.7 0.8           Apnea & Hypopnea 8 2 9 1 10   Apnea & Hypopnea Index 1.8 4.0 2.5 0.7 2.0           RERAs 2 - 2 - 2   RERA Index 0.4 - 0.6 - 0.4           RDI 2.2 4.0 3.0 0.7 2.4     Scoring Criteria: Hypopneas scored at 4% desaturation criteria.    Respiratory Event Durations     Apnea Hypopnea    NREM REM NREM REM   Average (seconds) 11.9 - 15.9 12.0   Maximum (seconds) 14.6 - 17.6 12.9       Oxygen Saturation Summary     Wake NREM REM TST Total   Average SpO2 92.3% 92.2% 94.3% 92.4% 92.4%   Minimum SpO2 85.0% 86.0% 87.0% 86.0% 85.0%   Maximum SpO2 98.0% 96.0% 97.0% 97.0% 98.0%     Oxygen Saturation Distribution    Range (%) Time in range (min)  Time in range (%)    90.0 - 100.0 364.8 85.4%   80.0 - 90.0 61.5 14.4%   70.0 - 80.0 - -   60.0 - 70.0 - -   50.0 - 60.0 - -   0.0 - 50.0 - -   Time Spent ?88% SpO2    Range (%) Time in range (min) Time in range (%)   0.0 - 88.0 2.4 0.6%          Count Index   Desaturations 12 2.4      Cardiac Summary     Wake NREM REM Sleep Total   Average Pulse Rate (BPM) 57.4 55.4 55.9 55.4 56.0   Minimum Pulse Rate (BPM) 46.0 46.0 45.0 45.0 45.0   Maximum Pulse Rate (BPM) 250.0 76.0 72.0 76.0 250.0     Pulse Rate Distribution    Range (bpm) Time in range (min) Time in range (%)   0.0 - 40.0 - -   40.0 - 60.0 371.1 86.9%   60.0 - 80.0 55.9 13.1%   80.0 - 100.0 - -   100.0 - 120.0 - -   120.0 - 140.0 - -   140.0 - 200.0 - -     EtCO2 Summary    Stage Min (mmHg) Average (mmHg) Max (mmHg)   Wake - - -   NREM(1+2+3) - - -   REM - - -     Range (mmHg) Time in range (min) Time in range (%)   20.0 - 40.0 - -   40.0 - 50.0 - -   50.0 - 55.0 - -   55.0 - 100.0 - -   Excluded data <20.0 & >65.0 432.5 100.0%     TcCO2 Summary    Stage Min (mmHg) Average (mmHg) Max (mmHg)   Wake - - -   NREM(1+2+3) - - -   REM - - -     Range (mmHg) Time in range (min) Time in range (%)   20.0 - 40.0 - -   40.0 - 50.0 - -   50.0 - 55.0 - -   55.0 - 100.0 - -   Excluded data <20.0 & >65.0 432.5 100.0%     Comments    -    Titration Summary    PAP Device PAP Level O2 Level Time (min) TST (min) NREM (min) REM (min) Wake (min) Sleep Eff% OA# CA# MA# Hyp# AHI RERA RDI Min SpO2 SpO2 ?88% (min) Ar. Index   CPAP 5 - 105.5 42.0 42.0 0.0 63.5 39.8% - - - - - - - 88.0  0.3 12.9   CPAP 7 - 151.5 111.5 107.0 4.5 40.0 73.6% - 5 - 2 3.8 1 4.3 86.0  0.3 13.5   CPAP 9 - 114.5 91.5 76.5 15.0 23.0 79.9% - 1 - 1 1.3 - 1.3 91.0  0.0 5.9   CPAP 10 - 61.0 55.0 44.5 10.5 6.0 90.2% - - - 1 1.1 1 2.2 87.0  0.2 3.3

## 2024-05-20 PROCEDURE — 95811 POLYSOM 6/>YRS CPAP 4/> PARM: CPT | Mod: 26,,, | Performed by: INTERNAL MEDICINE

## 2024-05-21 ENCOUNTER — PATIENT MESSAGE (OUTPATIENT)
Dept: SLEEP MEDICINE | Facility: CLINIC | Age: 74
End: 2024-05-21
Payer: MEDICARE

## 2024-05-21 ENCOUNTER — PATIENT MESSAGE (OUTPATIENT)
Dept: NEUROLOGY | Facility: HOSPITAL | Age: 74
End: 2024-05-21
Payer: MEDICARE

## 2024-05-21 DIAGNOSIS — G47.33 OSA (OBSTRUCTIVE SLEEP APNEA): Primary | ICD-10-CM

## 2024-05-24 ENCOUNTER — HOSPITAL ENCOUNTER (OUTPATIENT)
Dept: CARDIOLOGY | Facility: HOSPITAL | Age: 74
Discharge: HOME | End: 2024-05-24
Attending: PSYCHIATRY & NEUROLOGY
Payer: MEDICARE

## 2024-05-24 VITALS
BODY MASS INDEX: 38.22 KG/M2 | WEIGHT: 267 LBS | HEART RATE: 63 BPM | HEIGHT: 70 IN | DIASTOLIC BLOOD PRESSURE: 83 MMHG | SYSTOLIC BLOOD PRESSURE: 130 MMHG

## 2024-05-24 DIAGNOSIS — G45.9 TIA (TRANSIENT ISCHEMIC ATTACK): ICD-10-CM

## 2024-05-24 LAB
ASCENDING AORTA: 3.98 CM
AV INDEX (PROSTH): 0.83
AV MEAN GRADIENT: 4 MMHG
AV PEAK GRADIENT: 9 MMHG
AV VALVE AREA BY VELOCITY RATIO: 2.31 CM²
AV VALVE AREA: 2.43 CM²
AV VELOCITY RATIO: 0.79
BSA FOR ECHO PROCEDURE: 2.45 M2
CV ECHO LV RWT: 0.38 CM
DOP CALC AO PEAK VEL: 1.53 M/S
DOP CALC AO VTI: 30.4 CM
DOP CALC LVOT AREA: 2.9 CM2
DOP CALC LVOT DIAMETER: 1.93 CM
DOP CALC LVOT PEAK VEL: 1.21 M/S
DOP CALC LVOT STROKE VOLUME: 73.8 CM3
DOP CALCLVOT PEAK VEL VTI: 25.24 CM
E WAVE DECELERATION TIME: 203.98 MSEC
E/A RATIO: 0.75
E/E' RATIO: 8 M/S
ECHO LV POSTERIOR WALL: 0.95 CM (ref 0.6–1.1)
EJECTION FRACTION: 60 %
FRACTIONAL SHORTENING: 32 % (ref 28–44)
INTERVENTRICULAR SEPTUM: 0.9 CM (ref 0.6–1.1)
LA MAJOR: 5.17 CM
LA MINOR: 5.36 CM
LA WIDTH: 3.94 CM
LEFT ATRIUM SIZE: 3.62 CM
LEFT ATRIUM VOLUME INDEX MOD: 22.8 ML/M2
LEFT ATRIUM VOLUME INDEX: 27 ML/M2
LEFT ATRIUM VOLUME MOD: 53.92 CM3
LEFT ATRIUM VOLUME: 63.81 CM3
LEFT INTERNAL DIMENSION IN SYSTOLE: 3.39 CM (ref 2.1–4)
LEFT VENTRICLE DIASTOLIC VOLUME INDEX: 50.58 ML/M2
LEFT VENTRICLE DIASTOLIC VOLUME: 119.38 ML
LEFT VENTRICLE MASS INDEX: 70 G/M2
LEFT VENTRICLE SYSTOLIC VOLUME INDEX: 20 ML/M2
LEFT VENTRICLE SYSTOLIC VOLUME: 47.25 ML
LEFT VENTRICULAR INTERNAL DIMENSION IN DIASTOLE: 5.02 CM (ref 3.5–6)
LEFT VENTRICULAR MASS: 165.12 G
LV LATERAL E/E' RATIO: 6.18 M/S
LV SEPTAL E/E' RATIO: 11.33 M/S
MV PEAK A VEL: 0.91 M/S
MV PEAK E VEL: 0.68 M/S
MV STENOSIS PRESSURE HALF TIME: 59.15 MS
MV VALVE AREA P 1/2 METHOD: 3.72 CM2
OHS CV RV/LV RATIO: 0.68 CM
RA MAJOR: 4.19 CM
RA PRESSURE ESTIMATED: 3 MMHG
RA WIDTH: 3.05 CM
RIGHT VENTRICULAR END-DIASTOLIC DIMENSION: 3.41 CM
SINUS: 3.77 CM
STJ: 2.88 CM
TDI LATERAL: 0.11 M/S
TDI SEPTAL: 0.06 M/S
TDI: 0.09 M/S
TRICUSPID ANNULAR PLANE SYSTOLIC EXCURSION: 1.88 CM
Z-SCORE OF LEFT VENTRICULAR DIMENSION IN END DIASTOLE: -6.92
Z-SCORE OF LEFT VENTRICULAR DIMENSION IN END SYSTOLE: -4.55

## 2024-05-24 PROCEDURE — 93306 TTE W/DOPPLER COMPLETE: CPT | Mod: 26,,, | Performed by: INTERNAL MEDICINE

## 2024-05-24 PROCEDURE — C8929 TTE W OR WO FOL WCON,DOPPLER: HCPCS

## 2024-05-24 PROCEDURE — 25500020 PHARM REV CODE 255: Performed by: PSYCHIATRY & NEUROLOGY

## 2024-05-24 RX ADMIN — HUMAN ALBUMIN MICROSPHERES AND PERFLUTREN 0.66 MG: 10; .22 INJECTION, SOLUTION INTRAVENOUS at 03:05

## 2024-05-24 NOTE — PROGRESS NOTES
Procedure explained. 22 g sl started in left forearm for optison use. Optison given ivp via sl for imaging by farrah castanon rdcs Tolerated well. Sl d/glenny after. Pressure applied.

## 2024-06-03 DIAGNOSIS — I10 ESSENTIAL HYPERTENSION: ICD-10-CM

## 2024-06-03 RX ORDER — LOSARTAN POTASSIUM AND HYDROCHLOROTHIAZIDE 12.5; 5 MG/1; MG/1
1 TABLET ORAL DAILY
Qty: 90 TABLET | Refills: 3 | Status: SHIPPED | OUTPATIENT
Start: 2024-06-03

## 2024-06-03 NOTE — TELEPHONE ENCOUNTER
Care Due:                  Date            Visit Type   Department     Provider  --------------------------------------------------------------------------------                                EP -                              PRIMARY      [facility] 2ND FLOOR  Last Visit: 04-      CARE ([facility])   [facility]                              EP -                              PRIMARY      [facility] 2ND FLOOR  Next Visit: 10-      CARE ([facility])   [facility]                                                            Last  Test          Frequency    Reason                     Performed    Due Date  --------------------------------------------------------------------------------    HBA1C.......  6 months...  tirzepatide,.............  09- 03-    [facility] Embedded Care Due Messages. Reference number: [id number].   6/03/2024 3:05:38 PM CDT

## 2024-06-05 ENCOUNTER — OFFICE VISIT (OUTPATIENT)
Dept: NEUROLOGY | Facility: CLINIC | Age: 74
End: 2024-06-05
Payer: MEDICARE

## 2024-06-05 VITALS
BODY MASS INDEX: 38.46 KG/M2 | HEIGHT: 70 IN | HEART RATE: 65 BPM | WEIGHT: 268.63 LBS | SYSTOLIC BLOOD PRESSURE: 136 MMHG | DIASTOLIC BLOOD PRESSURE: 87 MMHG

## 2024-06-05 DIAGNOSIS — R29.818 TRANSIENT NEUROLOGICAL SYMPTOMS: Primary | ICD-10-CM

## 2024-06-05 PROCEDURE — 99999 PR PBB SHADOW E&M-EST. PATIENT-LVL III: CPT | Mod: PBBFAC,,, | Performed by: PSYCHIATRY & NEUROLOGY

## 2024-06-05 PROCEDURE — 99213 OFFICE O/P EST LOW 20 MIN: CPT | Mod: PBBFAC | Performed by: PSYCHIATRY & NEUROLOGY

## 2024-06-05 PROCEDURE — 99213 OFFICE O/P EST LOW 20 MIN: CPT | Mod: S$PBB,,, | Performed by: PSYCHIATRY & NEUROLOGY

## 2024-06-05 NOTE — PROGRESS NOTES
Vascular Neurology  Clinic Note    ___________________  ASSESSMENT & PLAN      Reason for Visit: f/u test results; we sat and discussed results of the CTA H*N and TTE;    Transient Neurological Symptoms  Overall atypical for an ischemic event given the duration, lack of laterality, component of hallucination (not consistent w/ peduncular hallucinosis).  CTA w/o significant disease, particularly vertebrobasilar.high grade lesions although b/l V4 segments do have calcification and mild stenosis.  TTE w/o any high risk features for embolic risk.  Continue lipitor  On digital HTN, still 130-150's, trial effects of CPAP on BP.  Has been on asa 81 mg daily, will continue this indefinitely    Visit Diagnoses:  1. Transient neurological symptoms               I have spent a total of 20 minutes in chart review, face-to-face encounter, laboratory and/or imaging review and interpretation and documentation for this patient.  ____________________________  Prior HPI: 73 y.o. 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 Monteiro however there does not seem to be any documentation. Had been seen in the ED for neurological symptoms that were felt to be medication induced (3/27/24)  HPI  Was on a flight from [facility], suddenly was disoriented and confused, imbalanced, couldn't move his feet, was placed in a wheelchair and he wasn't able to express himself, he eventually made it to his connection, when got off the flight, he had a fall at the cab stand, wasn't able to move either foot. Had some visual hallucination that his legs were curling up but otherwise no other hallucinations. Eventually made it home, went to bed and woke up next day. Finally went to the ED the next day but most of his symptoms had resolved. He has baseline imbalance from neuroma and it has been a little bit worse since then.  Brain Imaging:  MRI  brain  Impression:     No evidence of acute intracranial pathology.     Left internal auditory canal lesion better characterized on prior contrast enhanced exams.  Continued non emergent follow-up with contrast enhanced MRI if clinically indicated.  Vessel Imaging:  Impression:     No acute intracranial process.     No significant stenosis at the carotid bifurcations by NASCET criteria.  No major branch stenosis or occlusion at the [id number] although there is  moderate stenosis with calcification of the intracranial vertebral arteries bilaterally with the left noted to be dominant.  Stenosis at the origin of the vertebral arteries is also suggested.  Cardiac Evaluation:    Left Ventricle: The left ventricle is normal in size. Normal wall thickness. There is normal systolic function. Ejection fraction by visual approximation is 60%. There is normal diastolic function.    Right Ventricle: Normal right ventricular cavity size. Wall thickness is normal. Systolic function is normal.    Aortic Valve: The aortic valve is a trileaflet valve. There is mild aortic valve sclerosis. There is mild annular calcification present.    Pulmonary Artery: Pulmonary artery pressure could not be estimated.    IVC/SVC: Normal venous pressure at 3 mmHg.  Other:     Relevant Labwork:  Recent Labs   Lab 09/06/23  0816 02/01/24  0904 03/27/24  1708   Hemoglobin A1C 5.2  --   --    LDL Cholesterol 169.0 H   < > 79.2   HDL 56   < > 48   Triglycerides 170 H   < > 99   Cholesterol 259 H   < > 147    < > = values in this interval not displayed.       I independently viewed the above imaging studies in addition to reviewing the report.  I reviewed the above labwork.    Vital Signs:      1/31/2024     8:29 AM 2/16/2024     9:01 AM 3/27/2024     3:09 PM 4/3/2024     2:43 PM 5/8/2024    10:16 AM 5/24/2024     3:33 PM 6/5/2024    11:09 AM   Vitals - 1 value per visit   SYSTOLIC 132 124 163 138 135 130 136   DIASTOLIC 74 62 95 78 72 83 87   Pulse  "76 75 80 75 65 63 65   Temp 98 °F (36.7 °C)  97.9 °F (36.6 °C)       Resp 16  17       SPO2 98 % 95 % 98 % 97 %      Weight (lb) 271.6 271.4 271 266.8 267.64 267 268.63   Weight (kg) 123.197 123.106 122.925 121.02 121.4 121.11 121.85   Height 5' 10" (1.778 m) 5' 10" (1.778 m) 5' 10" (1.778 m) 5' 10" (1.778 m) 5' 10" (1.778 m) 5' 10" (1.778 m) 5' 10" (1.778 m)   BMI (Calculated) 39 38.9 38.9 38.3 38.4 38.3 38.5   Pain Score Zero Zero  Zero Zero  Zero       Past Medical History  Past Medical History:   Diagnosis Date    Anxiety     Colon polyp     benign    Depression     Elevated PSA     High cholesterol     Hypertension     Prostate cancer      Current Outpatient Medications   Medication Instructions    ABILIFY 2 mg, Daily    ALPRAZolam (XANAX) 0.5 mg, 2 times daily PRN    aspirin (ECOTRIN) 81 mg, Oral, Daily    atorvastatin (LIPITOR) 40 mg, Oral, Daily    azelastine (ASTELIN) 137 mcg (0.1 %) nasal spray 1 spray, Daily    buPROPion (WELLBUTRIN XL) 300 MG 24 hr tablet No dose, route, or frequency recorded.    buPROPion (WELLBUTRIN XL) 150 mg, Oral, Every morning    ciclopirox (LOPROX) 0.77 % Crea Topical (Top), 2 times daily    clobetasol 0.05% (TEMOVATE) 0.05 % Oint Topical (Top), 2 times daily    CYMBALTA 60 mg, Daily    levothyroxine (SYNTHROID) 100 mcg, Oral, Every morning    losartan-hydrochlorothiazide 50-12.5 mg (HYZAAR) 50-12.5 mg per tablet 1 tablet, Oral, Daily    temazepam (RESTORIL) 30 mg, Oral, Nightly PRN    traZODone (DESYREL) 100 mg, Nightly PRN    ZEPBOUND 2.5 mg, Subcutaneous, Every 7 days        Social History  Social History     Socioeconomic History    Marital status:    Tobacco Use    Smoking status: Never    Smokeless tobacco: Never   Substance and Sexual Activity    Alcohol use: Yes     Alcohol/week: 24.0 standard drinks of alcohol     Types: 20 Glasses of wine, 4 Cans of beer per week     Comment: social    Drug use: No    Sexual activity: Yes     Partners: Female     Social " Determinants of Health     Financial Resource Strain: Low Risk  (3/28/2024)    Overall Financial Resource Strain (CARDIA)     Difficulty of Paying Living Expenses: Not hard at all   Food Insecurity: No Food Insecurity (2/16/2024)    Hunger Vital Sign     Worried About Running Out of Food in the Last Year: Never true     Ran Out of Food in the Last Year: Never true   Transportation Needs: No Transportation Needs (3/28/2024)    PRAPARE - Transportation     Lack of Transportation (Medical): No     Lack of Transportation (Non-Medical): No   Physical Activity: Unknown (2/16/2024)    Exercise Vital Sign     Days of Exercise per Week: 2 days   Stress: Stress Concern Present (2/16/2024)    [employer] - Occupational Stress Questionnaire     Feeling of Stress : To some extent   Housing Stability: Low Risk  (2/16/2024)    Housing Stability Vital Sign     Unable to Pay for Housing in the Last Year: No     Number of Places Lived in the Last Year: 1     Unstable Housing in the Last Year: No              MD Randee  Vascular Neurology  Office [phone]

## 2024-07-30 DIAGNOSIS — Z00.00 ENCOUNTER FOR MEDICARE ANNUAL WELLNESS EXAM: ICD-10-CM

## 2024-08-22 ENCOUNTER — E-VISIT (OUTPATIENT)
Dept: FAMILY MEDICINE | Facility: CLINIC | Age: 74
End: 2024-08-22
Payer: MEDICARE

## 2024-08-22 DIAGNOSIS — I70.0 AORTIC ATHEROSCLEROSIS: Primary | ICD-10-CM

## 2024-08-22 DIAGNOSIS — I10 PRIMARY HYPERTENSION: ICD-10-CM

## 2024-08-22 RX ORDER — LOSARTAN POTASSIUM AND HYDROCHLOROTHIAZIDE 25; 100 MG/1; MG/1
1 TABLET ORAL DAILY
Qty: 90 TABLET | Refills: 3 | Status: SHIPPED | OUTPATIENT
Start: 2024-08-22 | End: 2025-08-22

## 2024-08-22 NOTE — PROGRESS NOTES
SUBJECTIVE:    CHIEF COMPLAINT:   Chief Complaint   Patient presents with    General Illness     Entered automatically based on patient selection in [employer].           274}    HISTORY OF PRESENT ILLNESS:  Adolfo Vazquez is a 73 y.o. male who presents to the clinic today for a problem visit.     Patient is currently on hyzaar 50-12.5 mg. However bp uncontrolled at home. Recommended by neurologist to bring down bp medication      PAST MEDICAL HISTORY:     274}  Past Medical History:   Diagnosis Date    Anxiety     Colon polyp     benign    Depression     Elevated PSA     High cholesterol     Hypertension     Prostate cancer        PAST SURGICAL HISTORY:  Past Surgical History:   Procedure Laterality Date    COLON SURGERY      COLONOSCOPY N/A 1/24/2019    Procedure: COLONOSCOPY;  Surgeon: Clifton Whiting MD;  [facility]  Service: Endoscopy;  Laterality: N/A;    COLONOSCOPY N/A 9/21/2022    Procedure: COLONOSCOPY;  Surgeon: Rigo Mcmillan MD;  Location: [facility] ENDO (4TH FLR);  Service: Colon and Rectal;  Laterality: N/A;  fully vacc, instr. via e-mail, clears 4 hrs prior, pm prep -PC    PROSTATE SURGERY      SKIN CANCER EXCISION         SOCIAL HISTORY:  Social History     Socioeconomic History    Marital status:    Tobacco Use    Smoking status: Never    Smokeless tobacco: Never   Substance and Sexual Activity    Alcohol use: Yes     Alcohol/week: 24.0 standard drinks of alcohol     Types: 20 Glasses of wine, 4 Cans of beer per week     Comment: social    Drug use: No    Sexual activity: Yes     Partners: Female     Social Determinants of Health     Financial Resource Strain: Low Risk  (3/28/2024)    Overall Financial Resource Strain (CARDIA)     Difficulty of Paying Living Expenses: Not hard at all   Food Insecurity: No Food Insecurity (2/16/2024)    Hunger Vital Sign     Worried About Running Out of Food in the Last Year: Never true     Ran Out of Food in the Last Year: Never true    Transportation Needs: No Transportation Needs (3/28/2024)    PRAPARE - Transportation     Lack of Transportation (Medical): No     Lack of Transportation (Non-Medical): No   Physical Activity: Unknown (2/16/2024)    Exercise Vital Sign     Days of Exercise per Week: 2 days   Stress: Stress Concern Present (2/16/2024)    [employer] - Occupational Stress Questionnaire     Feeling of Stress : To some extent   Housing Stability: Low Risk  (2/16/2024)    Housing Stability Vital Sign     Unable to Pay for Housing in the Last Year: No     Number of Places Lived in the Last Year: 1     Unstable Housing in the Last Year: No       FAMILY HISTORY:       Family History   Problem Relation Name Age of Onset    Cancer Father esophageal         esophageal CA    Emphysema Mother      Coronary artery disease Maternal Grandfather      Cancer Maternal Uncle colon        ALLERGIES AND MEDICATIONS: updated and reviewed.      274}  Review of patient's allergies indicates:  No Known Allergies  Medication List with Changes/Refills   New Medications    LOSARTAN-HYDROCHLOROTHIAZIDE 100-25 MG (HYZAAR) 100-25 MG PER TABLET    Take 1 tablet by mouth once daily.   Current Medications    ABILIFY 2 MG TAB    2 mg once daily.     ALBUTEROL (VENTOLIN HFA) 90 MCG/ACTUATION INHALER    Inhale 2 puffs into the lungs every 6 (six) hours as needed for Wheezing. Rescue    ALPRAZOLAM (XANAX) 0.5 MG TABLET    0.5 mg 2 (two) times daily as needed.     ASPIRIN (ECOTRIN) 81 MG EC TABLET    Take 1 tablet (81 mg total) by mouth once daily.    ATORVASTATIN (LIPITOR) 40 MG TABLET    Take 1 tablet (40 mg total) by mouth once daily.    AZELASTINE (ASTELIN) 137 MCG (0.1 %) NASAL SPRAY    1 spray once daily.    BUPROPION (WELLBUTRIN XL) 150 MG TB24 TABLET    Take 150 mg by mouth every morning.    BUPROPION (WELLBUTRIN XL) 300 MG 24 HR TABLET        CICLOPIROX (LOPROX) 0.77 % CREA    Apply topically 2 (two) times daily.    CLOBETASOL 0.05%  "(TEMOVATE) 0.05 % OINT    Apply topically 2 (two) times daily.    CYMBALTA 60 MG CAPSULE    Take 60 mg by mouth once daily.     LEVOTHYROXINE (SYNTHROID) 100 MCG TABLET    Take 1 tablet (100 mcg total) by mouth every morning.    TEMAZEPAM (RESTORIL) 30 MG CAPSULE    Take 30 mg by mouth nightly as needed.    TRAZODONE (DESYREL) 100 MG TABLET    100 mg nightly as needed.    Discontinued Medications    LOSARTAN-HYDROCHLOROTHIAZIDE 50-12.5 MG (HYZAAR) 50-12.5 MG PER TABLET    Take 1 tablet by mouth once daily.    TIRZEPATIDE, WEIGHT LOSS, (ZEPBOUND) 2.5 MG/0.5 ML PNIJ    Inject 2.5 mg into the skin every 7 days.       SCREENING HISTORY:    274}  Health Maintenance         Date Due Completion Date    RSV Vaccine (Age 60+ and Pregnant patients) (1 - 1-dose 60+ series) Never done ---    Shingles Vaccine (2 of 3) 02/24/2015 12/30/2014    COVID-19 Vaccine (3 - 2023-24 season) 09/01/2023 3/13/2021    Influenza Vaccine (1) 09/01/2024 9/23/2023    Colorectal Cancer Screening 09/21/2025 9/21/2022    TETANUS VACCINE 06/07/2027 6/7/2017    Lipid Panel 03/27/2029 3/27/2024            REVIEW OF SYSTEMS:   Review of Systems   Constitutional:  Negative for chills, fever, malaise/fatigue and weight loss.   Respiratory:  Negative for cough, shortness of breath and wheezing.    Cardiovascular:  Negative for chest pain, palpitations and leg swelling.       PHYSICAL EXAM:      274}  There were no vitals taken for this visit.  Wt Readings from Last 3 Encounters:   06/05/24 121.9 kg (268 lb 10.1 oz)   05/24/24 121.1 kg (267 lb)   05/08/24 121.4 kg (267 lb 10.2 oz)     BP Readings from Last 3 Encounters:   06/05/24 136/87   05/24/24 130/83   05/08/24 135/72     Estimated body mass index is 38.54 kg/m² as calculated from the following:    Height as of 6/5/24: 5' 10" (1.778 m).    Weight as of 6/5/24: 121.9 kg (268 lb 10.1 oz).     Physical Exam    LABS:   274}  I have reviewed old labs below:  Lab Results   Component Value Date    WBC 7.13 " 03/27/2024    HGB 14.3 03/27/2024    HCT 44.0 03/27/2024    MCV 98 03/27/2024     03/27/2024     03/27/2024    K 3.6 03/27/2024     03/27/2024    CALCIUM 9.6 03/27/2024    CO2 28 03/27/2024    GLU 95 03/27/2024    BUN 13 03/27/2024    CREATININE 1.1 05/08/2024    ANIONGAP 9 03/27/2024    ESTGFRAFRICA >60.0 06/24/2021    EGFRNONAA >60.0 06/24/2021    PROT 6.9 03/27/2024    ALBUMIN 3.6 03/27/2024    BILITOT 0.4 03/27/2024    ALKPHOS 63 03/27/2024    ALT 27 03/27/2024    AST 26 03/27/2024    INR 1.1 03/27/2024    CHOL 147 03/27/2024    TRIG 99 03/27/2024    HDL 48 03/27/2024    LDLCALC 79.2 03/27/2024    TSH 2.973 03/27/2024    PSA <0.01 10/18/2022    HGBA1C 5.2 09/06/2023       ASSESSMENT AND PLAN:  274}  1. Aortic atherosclerosis  - losartan-hydrochlorothiazide 100-25 mg (HYZAAR) 100-25 mg per tablet; Take 1 tablet by mouth once daily.  Dispense: 90 tablet; Refill: 3    2. Primary hypertension  Will increase hyzaar.  - losartan-hydrochlorothiazide 100-25 mg (HYZAAR) 100-25 mg per tablet; Take 1 tablet by mouth once daily.  Dispense: 90 tablet; Refill: 3         No orders of the defined types were placed in this encounter.      No follow-ups on file. or sooner as needed.    11 encounter minutes

## 2024-08-27 ENCOUNTER — E-VISIT (OUTPATIENT)
Dept: FAMILY MEDICINE | Facility: CLINIC | Age: 74
End: 2024-08-27
Payer: MEDICARE

## 2024-08-27 DIAGNOSIS — Z23 ENCOUNTER FOR VACCINATION: Primary | ICD-10-CM

## 2024-08-27 NOTE — PROGRESS NOTES
SUBJECTIVE:    CHIEF COMPLAINT:   Chief Complaint   Patient presents with    General Illness     Entered automatically based on patient selection in [employer].           274}    HISTORY OF PRESENT ILLNESS:  Adolfo Vazquez is a 73 y.o. male who presents to the clinic today for vaccinations     Patient desires to have flu vaccination.      PAST MEDICAL HISTORY:     274}  Past Medical History:   Diagnosis Date    Anxiety     Colon polyp     benign    Depression     Elevated PSA     High cholesterol     Hypertension     Prostate cancer        PAST SURGICAL HISTORY:  Past Surgical History:   Procedure Laterality Date    COLON SURGERY      COLONOSCOPY N/A 1/24/2019    Procedure: COLONOSCOPY;  Surgeon: Clifton Whiting MD;  Location: [facility] ([address] [place] [address]);  Service: Endoscopy;  Laterality: N/A;    COLONOSCOPY N/A 9/21/2022    Procedure: COLONOSCOPY;  Surgeon: Rigo Mcmillan MD;  Location: [facility] ([address] [place] [address]);  Service: Colon and Rectal;  Laterality: N/A;  fully vacc, instr. via e-mail, clears 4 hrs prior, pm prep -PC    PROSTATE SURGERY      SKIN CANCER EXCISION         SOCIAL HISTORY:  Social History     Socioeconomic History    Marital status:    Tobacco Use    Smoking status: Never    Smokeless tobacco: Never   Substance and Sexual Activity    Alcohol use: Yes     Alcohol/week: 24.0 standard drinks of alcohol     Types: 20 Glasses of wine, 4 Cans of beer per week     Comment: social    Drug use: No    Sexual activity: Yes     Partners: Female     Social Determinants of Health     Financial Resource Strain: Low Risk  (3/28/2024)    Overall Financial Resource Strain (CARDIA)     Difficulty of Paying Living Expenses: Not hard at all   Food Insecurity: No Food Insecurity (2/16/2024)    Hunger Vital Sign     Worried About Running Out of Food in the Last Year: Never true     Ran Out of Food in the Last Year: Never true   Transportation Needs: No Transportation Needs (3/28/2024)    PRAPARE - Transportation     Lack  of Transportation (Medical): No     Lack of Transportation (Non-Medical): No   Physical Activity: Unknown (2/16/2024)    Exercise Vital Sign     Days of Exercise per Week: 2 days   Stress: Stress Concern Present (2/16/2024)    [employer] - Occupational Stress Questionnaire     Feeling of Stress : To some extent   Housing Stability: Low Risk  (2/16/2024)    Housing Stability Vital Sign     Unable to Pay for Housing in the Last Year: No     Number of Places Lived in the Last Year: 1     Unstable Housing in the Last Year: No       FAMILY HISTORY:       Family History   Problem Relation Name Age of Onset    Cancer Father esophageal         esophageal CA    Emphysema Mother      Coronary artery disease Maternal Grandfather      Cancer Maternal Uncle colon        ALLERGIES AND MEDICATIONS: updated and reviewed.      274}  Review of patient's allergies indicates:  No Known Allergies  Medication List with Changes/Refills   Current Medications    ABILIFY 2 MG TAB    2 mg once daily.     ALBUTEROL (VENTOLIN HFA) 90 MCG/ACTUATION INHALER    Inhale 2 puffs into the lungs every 6 (six) hours as needed for Wheezing. Rescue    ALPRAZOLAM (XANAX) 0.5 MG TABLET    0.5 mg 2 (two) times daily as needed.     ASPIRIN (ECOTRIN) 81 MG EC TABLET    Take 1 tablet (81 mg total) by mouth once daily.    ATORVASTATIN (LIPITOR) 40 MG TABLET    Take 1 tablet (40 mg total) by mouth once daily.    AZELASTINE (ASTELIN) 137 MCG (0.1 %) NASAL SPRAY    1 spray once daily.    BUPROPION (WELLBUTRIN XL) 150 MG TB24 TABLET    Take 150 mg by mouth every morning.    BUPROPION (WELLBUTRIN XL) 300 MG 24 HR TABLET        CICLOPIROX (LOPROX) 0.77 % CREA    Apply topically 2 (two) times daily.    CLOBETASOL 0.05% (TEMOVATE) 0.05 % OINT    Apply topically 2 (two) times daily.    CYMBALTA 60 MG CAPSULE    Take 60 mg by mouth once daily.     LEVOTHYROXINE (SYNTHROID) 100 MCG TABLET    Take 1 tablet (100 mcg total) by mouth every  "morning.    LOSARTAN-HYDROCHLOROTHIAZIDE 100-25 MG (HYZAAR) 100-25 MG PER TABLET    Take 1 tablet by mouth once daily.    TEMAZEPAM (RESTORIL) 30 MG CAPSULE    Take 30 mg by mouth nightly as needed.    TRAZODONE (DESYREL) 100 MG TABLET    100 mg nightly as needed.        SCREENING HISTORY:    274}  Health Maintenance         Date Due Completion Date    RSV Vaccine (Age 60+ and Pregnant patients) (1 - 1-dose 60+ series) Never done ---    Shingles Vaccine (2 of 3) 02/24/2015 12/30/2014    COVID-19 Vaccine (3 - 2023-24 season) 09/01/2023 3/13/2021    Influenza Vaccine (1) 09/01/2024 9/23/2023    High Dose Statin 06/05/2025 6/5/2024    Colorectal Cancer Screening 09/21/2025 9/21/2022    TETANUS VACCINE 06/07/2027 6/7/2017    Lipid Panel 03/27/2029 3/27/2024            REVIEW OF SYSTEMS:   Review of Systems   Constitutional:  Negative for chills, fever, malaise/fatigue and weight loss.       PHYSICAL EXAM:      274}  There were no vitals taken for this visit.  Wt Readings from Last 3 Encounters:   06/05/24 121.9 kg (268 lb 10.1 oz)   05/24/24 121.1 kg (267 lb)   05/08/24 121.4 kg (267 lb 10.2 oz)     BP Readings from Last 3 Encounters:   06/05/24 136/87   05/24/24 130/83   05/08/24 135/72     Estimated body mass index is 38.54 kg/m² as calculated from the following:    Height as of 6/5/24: 5' 10" (1.778 m).    Weight as of 6/5/24: 121.9 kg (268 lb 10.1 oz).     Physical Exam    LABS:   274}  I have reviewed old labs below:  Lab Results   Component Value Date    WBC 7.13 03/27/2024    HGB 14.3 03/27/2024    HCT 44.0 03/27/2024    MCV 98 03/27/2024     03/27/2024     03/27/2024    K 3.6 03/27/2024     03/27/2024    CALCIUM 9.6 03/27/2024    CO2 28 03/27/2024    GLU 95 03/27/2024    BUN 13 03/27/2024    CREATININE 1.1 05/08/2024    ANIONGAP 9 03/27/2024    ESTGFRAFRICA >60.0 06/24/2021    EGFRNONAA >60.0 06/24/2021    PROT 6.9 03/27/2024    ALBUMIN 3.6 03/27/2024    BILITOT 0.4 03/27/2024    ALKPHOS 63 " 03/27/2024    ALT 27 03/27/2024    AST 26 03/27/2024    INR 1.1 03/27/2024    CHOL 147 03/27/2024    TRIG 99 03/27/2024    HDL 48 03/27/2024    LDLCALC 79.2 03/27/2024    TSH 2.973 03/27/2024    PSA <0.01 10/18/2022    HGBA1C 5.2 09/06/2023       ASSESSMENT AND PLAN:  274}  1. Encounter for vaccination  Informed patient that we do not have flu vaccine until 9/1. Advise patient go to pharmacy for vaccinations         No orders of the defined types were placed in this encounter.      No follow-ups on file. or sooner as needed.      7 encounter minutes

## 2024-09-12 ENCOUNTER — TELEPHONE (OUTPATIENT)
Dept: FAMILY MEDICINE | Facility: CLINIC | Age: 74
End: 2024-09-12
Payer: MEDICARE

## 2024-09-12 DIAGNOSIS — Z23 ENCOUNTER FOR VACCINATION: Primary | ICD-10-CM

## 2024-09-12 NOTE — TELEPHONE ENCOUNTER
----- Message from Jr Haque sent at 9/12/2024  2:09 PM CDT -----  Regarding: appointment  Contact: patient  Type:  Patient Returning Call    Who Called:patient  Who Left Message for Patient:office staff  Does the patient know what this is regarding?:appointment to have a flu shot  Would the patient rather a call back or a response via [facility]?   Best Call Back Number:[phone]  Additional Information:

## 2024-09-13 ENCOUNTER — CLINICAL SUPPORT (OUTPATIENT)
Dept: FAMILY MEDICINE | Facility: CLINIC | Age: 74
End: 2024-09-13
Payer: MEDICARE

## 2024-09-13 DIAGNOSIS — Z23 INFLUENZA VACCINE NEEDED: Primary | ICD-10-CM

## 2024-09-13 PROCEDURE — 99999 PR PBB SHADOW E&M-EST. PATIENT-LVL I: CPT | Mod: PBBFAC,,,

## 2024-09-13 PROCEDURE — 99211 OFF/OP EST MAY X REQ PHY/QHP: CPT | Mod: PBBFAC

## 2024-09-13 NOTE — PROGRESS NOTES
Pt seen today for Influenza (FLUAD) - Quadrivalent (Adjuvanted) *Preferred* (65+) (PF). Administered per MAR. Pt tolerated well.

## 2024-09-29 NOTE — PROGRESS NOTES
"  FOLLOW-UP VISIT    Adolfo Vazquez  is a pleasant 73 y.o. male established with the [facility] sleep medicine clinic    LOV: 4.18.24 Toy    Here today for:  follow-up     Since last visit:   See assessment below      Vitals:    10/03/24 1258   BP: 128/79   Pulse: 62   Weight: 121 kg (266 lb 12.1 oz)   Height: 5' 10" (1.778 m)      Physical Exam:  GEN:   Well-appearing, vitals reviewed  SKIN:  No rash on the face or bridge of the nose  EYES:  No icterus, pupils equal      RECORDS REVIEWED      HST 12.17.2020: AHI 59, RDI 74, 9.6% <90%    CPAP titraiton 5/13/21: 5cwp ok supine SWS, 9cwp lat NREM, 10cwp lat REM, no supine REM, rec 9-12, auto ramp =5cwp    PMH:   depression, hypertension, erectile dysfunction, s/p prostatectomy who presented November 2020 for evaluation of witnessed apneas by his wife  PROBLEM DESCRIPTION/ Sx on Presentation Interval Hx STATUS PLAN     Severe  MAU   Presentation:    CPAP intolerance,  recent TIA   less snoring, no witnessed  apneas    Similar weight         PAP history   Dx Study    Mask Under the nose mask (prefers)  FFM (fit pretty well)   DME HME ([place])   My Air    CPAP age ? Early 2024? Not in AV   PAP altn    Benefits    PROBS Not sure he is sleeping better with CPAP than without               Since last visit:       PAP 5/7/24:   Rx: Darius 10 , PS , max  Push min to: 11 Min Ramp: 7  ResMed AirFit F20 size medium    Received CPAP machine      Not in AV 9.29.24    Reports some usage but less of late due wife's illness   controlled         PAP PLAN   E min ? cwp ()   I max ? cwp ()   PS/epr    RAMP    Other    Altn.        -encouraged increased usage        The patient is using and benefitting from PAP therapy when he is using sufficiently         Insomnia       SLEEP SCHEDULE   Duration    Wind- down    Envmnt    CBTi    Meds prior    Meds now Temazepam  Trazodone 50mg    Bed Time 10P   Lights out    Latency 15-30min   Arousals 3   Back to sleep 15min   Stim. ctrl    Wake time " 7-8A   Caffeine    Naps    Nocturia 1-2   Work Retired                 Feels he sleeps ok    He is not sure if he needs the medications     largely controlled       -hypnotics prescribed by his primary    We discussed the possible adverse effects from chronic hypnotic use including:  -increased risk of falls, grogginess, confusion, memory issues      -consider weaning if he can tolerate       Other issues:     RTC:  yearly or sooner if problems arise

## 2024-10-03 ENCOUNTER — OFFICE VISIT (OUTPATIENT)
Dept: FAMILY MEDICINE | Facility: CLINIC | Age: 74
End: 2024-10-03
Payer: MEDICARE

## 2024-10-03 ENCOUNTER — OFFICE VISIT (OUTPATIENT)
Dept: SLEEP MEDICINE | Facility: CLINIC | Age: 74
End: 2024-10-03
Payer: MEDICARE

## 2024-10-03 VITALS
SYSTOLIC BLOOD PRESSURE: 136 MMHG | OXYGEN SATURATION: 98 % | DIASTOLIC BLOOD PRESSURE: 76 MMHG | BODY MASS INDEX: 38.46 KG/M2 | HEART RATE: 62 BPM | HEIGHT: 70 IN | WEIGHT: 268.63 LBS

## 2024-10-03 VITALS
WEIGHT: 266.75 LBS | BODY MASS INDEX: 38.19 KG/M2 | HEIGHT: 70 IN | DIASTOLIC BLOOD PRESSURE: 79 MMHG | HEART RATE: 62 BPM | SYSTOLIC BLOOD PRESSURE: 128 MMHG

## 2024-10-03 DIAGNOSIS — G47.09 OTHER INSOMNIA: ICD-10-CM

## 2024-10-03 DIAGNOSIS — I10 PRIMARY HYPERTENSION: Primary | ICD-10-CM

## 2024-10-03 DIAGNOSIS — G47.33 OSA (OBSTRUCTIVE SLEEP APNEA): Primary | ICD-10-CM

## 2024-10-03 DIAGNOSIS — E66.813 CLASS 3 OBESITY: ICD-10-CM

## 2024-10-03 DIAGNOSIS — I70.0 AORTIC ATHEROSCLEROSIS: ICD-10-CM

## 2024-10-03 PROCEDURE — 99214 OFFICE O/P EST MOD 30 MIN: CPT | Mod: S$PBB,,, | Performed by: INTERNAL MEDICINE

## 2024-10-03 PROCEDURE — 99214 OFFICE O/P EST MOD 30 MIN: CPT | Mod: S$PBB,,, | Performed by: STUDENT IN AN ORGANIZED HEALTH CARE EDUCATION/TRAINING PROGRAM

## 2024-10-03 PROCEDURE — 99213 OFFICE O/P EST LOW 20 MIN: CPT | Mod: PBBFAC,27 | Performed by: INTERNAL MEDICINE

## 2024-10-03 PROCEDURE — 99213 OFFICE O/P EST LOW 20 MIN: CPT | Mod: PBBFAC | Performed by: STUDENT IN AN ORGANIZED HEALTH CARE EDUCATION/TRAINING PROGRAM

## 2024-10-03 PROCEDURE — 99999 PR PBB SHADOW E&M-EST. PATIENT-LVL III: CPT | Mod: PBBFAC,,, | Performed by: STUDENT IN AN ORGANIZED HEALTH CARE EDUCATION/TRAINING PROGRAM

## 2024-10-03 PROCEDURE — 99999 PR PBB SHADOW E&M-EST. PATIENT-LVL III: CPT | Mod: PBBFAC,,, | Performed by: INTERNAL MEDICINE

## 2024-10-03 RX ORDER — TIRZEPATIDE 2.5 MG/.5ML
2.5 INJECTION, SOLUTION SUBCUTANEOUS
Qty: 4 PEN | Refills: 0 | Status: SHIPPED | OUTPATIENT
Start: 2024-10-03

## 2024-10-03 RX ORDER — HYDROCHLOROTHIAZIDE 25 MG/1
25 TABLET ORAL DAILY
Qty: 30 TABLET | Refills: 11 | Status: SHIPPED | OUTPATIENT
Start: 2024-10-03 | End: 2025-10-03

## 2024-10-03 NOTE — PROGRESS NOTES
SUBJECTIVE:    CHIEF COMPLAINT: No chief complaint on file.          274}    HISTORY OF PRESENT ILLNESS:  Adolfo Vazquez is a 73 y.o. male who presents to the clinic today for a follow up     Patient is following up with sleep medicine for odell and is on cpap which patient admits to not being complaint patient has an appt today.     Patient following up w/ dermatologist for actinic keratosis, seborrheic keratosis.     Patient f/u w/ psychiatrist in [place]. Which patient is receiving restoril and xanax    Patient f/u w/ neurologist d/t undiagnosed condition in the that resulted in temporary paralysis.     Patient has htn and neurologist recommending of bp below 130/80. Patient has tried calorie deficit diet with increased physical activity without weight loss.  Patient has atherosclerosis and odell.     PAST MEDICAL HISTORY:     274}  Past Medical History:   Diagnosis Date    Anxiety     Colon polyp     benign    Depression     Elevated PSA     High cholesterol     Hypertension     Prostate cancer        PAST SURGICAL HISTORY:  Past Surgical History:   Procedure Laterality Date    COLON SURGERY      COLONOSCOPY N/A 1/24/2019    Procedure: COLONOSCOPY;  Surgeon: Clifton Whiting MD;  Location: [facility] ([address] [place] [address]);  Service: Endoscopy;  Laterality: N/A;    COLONOSCOPY N/A 9/21/2022    Procedure: COLONOSCOPY;  Surgeon: Rigo Mcmillan MD;  Location: [facility] END[facility] ([address] [place] [address]);  Service: Colon and Rectal;  Laterality: N/A;  fully vacc, instr. via e-mail, clears 4 hrs prior, pm prep -PC    PROSTATE SURGERY      SKIN CANCER EXCISION         SOCIAL HISTORY:  Social History     Socioeconomic History    Marital status:    Tobacco Use    Smoking status: Never    Smokeless tobacco: Never   Substance and Sexual Activity    Alcohol use: Yes     Alcohol/week: 24.0 standard drinks of alcohol     Types: 20 Glasses of wine, 4 Cans of beer per week     Comment: daily    Drug use: No    Sexual activity: Not Currently      Partners: Female     Social Drivers of Health     Financial Resource Strain: Low Risk  (3/28/2024)    Overall Financial Resource Strain (CARDIA)     Difficulty of Paying Living Expenses: Not hard at all   Food Insecurity: No Food Insecurity (2/16/2024)    Hunger Vital Sign     Worried About Running Out of Food in the Last Year: Never true     Ran Out of Food in the Last Year: Never true   Transportation Needs: No Transportation Needs (3/28/2024)    PRAPARE - Transportation     Lack of Transportation (Medical): No     Lack of Transportation (Non-Medical): No   Physical Activity: Unknown (2/16/2024)    Exercise Vital Sign     Days of Exercise per Week: 2 days   Stress: Stress Concern Present (2/16/2024)    [place] [employer] - Occupational Stress Questionnaire     Feeling of Stress : To some extent   Housing Stability: Low Risk  (2/16/2024)    Housing Stability Vital Sign     Unable to Pay for Housing in the Last Year: No     Number of Places Lived in the Last Year: 1     Unstable Housing in the Last Year: No       FAMILY HISTORY:       Family History   Problem Relation Name Age of Onset    Cancer Father esophageal         esophageal CA    Emphysema Mother      Coronary artery disease Maternal Grandfather      Cancer Maternal Uncle colon        ALLERGIES AND MEDICATIONS: updated and reviewed.      274}  Review of patient's allergies indicates:  No Known Allergies  Medication List with Changes/Refills   New Medications    HYDROCHLOROTHIAZIDE (HYDRODIURIL) 25 MG TABLET    Take 1 tablet (25 mg total) by mouth once daily.    TIRZEPATIDE, WEIGHT LOSS, (ZEPBOUND) 2.5 MG/0.5 ML PNIJ    Inject 2.5 mg into the skin every 7 days.   Current Medications    ALBUTEROL (VENTOLIN HFA) 90 MCG/ACTUATION INHALER    Inhale 2 puffs into the lungs every 6 (six) hours as needed for Wheezing. Rescue    ALPRAZOLAM (XANAX) 0.5 MG TABLET    0.5 mg 2 (two) times daily as needed.     ASPIRIN (ECOTRIN) 81 MG EC TABLET    Take  1 tablet (81 mg total) by mouth once daily.    ATORVASTATIN (LIPITOR) 40 MG TABLET    Take 1 tablet (40 mg total) by mouth once daily.    AZELASTINE (ASTELIN) 137 MCG (0.1 %) NASAL SPRAY    1 spray once daily.    BUPROPION (WELLBUTRIN XL) 150 MG TB24 TABLET    Take 150 mg by mouth every morning.    BUPROPION (WELLBUTRIN XL) 300 MG 24 HR TABLET        CICLOPIROX (LOPROX) 0.77 % CREA    Apply topically 2 (two) times daily.    CLOBETASOL 0.05% (TEMOVATE) 0.05 % OINT    Apply topically 2 (two) times daily.    CYMBALTA 60 MG CAPSULE    Take 60 mg by mouth once daily.     LEVOTHYROXINE (SYNTHROID) 100 MCG TABLET    Take 1 tablet (100 mcg total) by mouth every morning.    LOSARTAN-HYDROCHLOROTHIAZIDE 100-25 MG (HYZAAR) 100-25 MG PER TABLET    Take 1 tablet by mouth once daily.    TEMAZEPAM (RESTORIL) 30 MG CAPSULE    Take 30 mg by mouth nightly as needed.    TRAZODONE (DESYREL) 100 MG TABLET    100 mg nightly as needed.    Discontinued Medications    ABILIFY 2 MG TAB    2 mg once daily.        SCREENING HISTORY:    274}  Health Maintenance         Date Due Completion Date    RSV Vaccine (Age 60+ and Pregnant patients) (1 - Risk 60-74 years 1-dose series) Never done ---    Shingles Vaccine (2 of 3) 02/24/2015 12/30/2014    COVID-19 Vaccine (3 - 2024-25 season) 09/01/2024 3/13/2021    Colorectal Cancer Screening 09/21/2025 9/21/2022    High Dose Statin 10/03/2025 10/3/2024    TETANUS VACCINE 06/07/2027 6/7/2017    Lipid Panel 03/27/2029 3/27/2024            REVIEW OF SYSTEMS:   Review of Systems   Constitutional:  Negative for chills, fever, malaise/fatigue and weight loss.   Respiratory:  Negative for cough, shortness of breath and wheezing.    Cardiovascular:  Negative for chest pain, palpitations and leg swelling.   Gastrointestinal:  Negative for abdominal pain, diarrhea, heartburn, nausea and vomiting.   Musculoskeletal:  Negative for back pain, joint pain, myalgias and neck pain.   Neurological:  Negative for  "dizziness, tingling, tremors, speech change and headaches.   Psychiatric/Behavioral:  Negative for depression. The patient is not nervous/anxious.        PHYSICAL EXAM:      274}  /76 (BP Location: Left arm, Patient Position: Sitting)   Pulse 62   Ht 5' 10" (1.778 m)   Wt 121.8 kg (268 lb 9.6 oz)   SpO2 98%   BMI 38.54 kg/m²   Wt Readings from Last 3 Encounters:   10/03/24 121.8 kg (268 lb 9.6 oz)   06/05/24 121.9 kg (268 lb 10.1 oz)   05/24/24 121.1 kg (267 lb)     BP Readings from Last 3 Encounters:   10/03/24 136/76   06/05/24 136/87   05/24/24 130/83     Estimated body mass index is 38.54 kg/m² as calculated from the following:    Height as of this encounter: 5' 10" (1.778 m).    Weight as of this encounter: 121.8 kg (268 lb 9.6 oz).     Physical Exam  HENT:      Head: Normocephalic and atraumatic.      Nose: Nose normal.      Mouth/Throat:      Mouth: Mucous membranes are dry.      Pharynx: Oropharynx is clear.   Eyes:      Extraocular Movements: Extraocular movements intact.      Conjunctiva/sclera: Conjunctivae normal.   Cardiovascular:      Rate and Rhythm: Normal rate and regular rhythm.   Pulmonary:      Breath sounds: Normal breath sounds.   Abdominal:      General: Bowel sounds are normal.      Palpations: Abdomen is soft.   Musculoskeletal:         General: Normal range of motion.      Cervical back: Normal range of motion.   Skin:     General: Skin is warm.   Neurological:      General: No focal deficit present.      Mental Status: He is alert. Mental status is at baseline.   Psychiatric:         Mood and Affect: Mood normal.         Thought Content: Thought content normal.         LABS:   274}  I have reviewed old labs below:  Lab Results   Component Value Date    WBC 7.13 03/27/2024    HGB 14.3 03/27/2024    HCT 44.0 03/27/2024    MCV 98 03/27/2024     03/27/2024     03/27/2024    K 3.6 03/27/2024     03/27/2024    CALCIUM 9.6 03/27/2024    CO2 28 03/27/2024    GLU 95 " 03/27/2024    BUN 13 03/27/2024    CREATININE 1.1 05/08/2024    ANIONGAP 9 03/27/2024    ESTGFRAFRICA >60.0 06/24/2021    EGFRNONAA >60.0 06/24/2021    PROT 6.9 03/27/2024    ALBUMIN 3.6 03/27/2024    BILITOT 0.4 03/27/2024    ALKPHOS 63 03/27/2024    ALT 27 03/27/2024    AST 26 03/27/2024    INR 1.1 03/27/2024    CHOL 147 03/27/2024    TRIG 99 03/27/2024    HDL 48 03/27/2024    LDLCALC 79.2 03/27/2024    TSH 2.973 03/27/2024    PSA <0.01 10/18/2022    HGBA1C 5.2 09/06/2023       ASSESSMENT AND PLAN:  274}  1. Aortic atherosclerosis  - tirzepatide, weight loss, (ZEPBOUND) 2.5 mg/0.5 mL PnIj; Inject 2.5 mg into the skin every 7 days.  Dispense: 4 Pen; Refill: 0    2. Primary hypertension  - hydroCHLOROthiazide (HYDRODIURIL) 25 MG tablet; Take 1 tablet (25 mg total) by mouth once daily.  Dispense: 30 tablet; Refill: 11  - tirzepatide, weight loss, (ZEPBOUND) 2.5 mg/0.5 mL PnIj; Inject 2.5 mg into the skin every 7 days.  Dispense: 4 Pen; Refill: 0    3. Class 3 obesity  - tirzepatide, weight loss, (ZEPBOUND) 2.5 mg/0.5 mL PnIj; Inject 2.5 mg into the skin every 7 days.  Dispense: 4 Pen; Refill: 0         No orders of the defined types were placed in this encounter.      No follow-ups on file. or sooner as needed.

## 2024-11-12 ENCOUNTER — E-VISIT (OUTPATIENT)
Dept: FAMILY MEDICINE | Facility: CLINIC | Age: 74
End: 2024-11-12
Payer: MEDICARE

## 2024-11-12 DIAGNOSIS — I10 PRIMARY HYPERTENSION: Primary | ICD-10-CM

## 2024-11-12 DIAGNOSIS — I70.0 AORTIC ATHEROSCLEROSIS: ICD-10-CM

## 2024-11-12 DIAGNOSIS — E66.813 CLASS 3 OBESITY: ICD-10-CM

## 2024-11-12 RX ORDER — SEMAGLUTIDE 0.25 MG/.5ML
3 INJECTION, SOLUTION SUBCUTANEOUS
Qty: 3 ML | Refills: 0 | Status: SHIPPED | OUTPATIENT
Start: 2024-11-12 | End: 2024-11-15 | Stop reason: SDUPTHER

## 2024-11-12 NOTE — PROGRESS NOTES
SUBJECTIVE:    CHIEF COMPLAINT:   Chief Complaint   Patient presents with    General Illness     Entered automatically based on patient selection in [employer].           274}    HISTORY OF PRESENT ILLNESS:  Adolfo Vazquez is a 73 y.o. male who presents to the clinic today. Patient has question about taking hydrochlorothiazide which he is taking max dose of. Patient noted his hypertension is from his weight gain. Patient has tried diet and portion control with increased physical activity and calorie deficit diet and increased physical activity by walking w/o improvement.     PAST MEDICAL HISTORY:     274}  Past Medical History:   Diagnosis Date    Anxiety     Colon polyp     benign    Depression     Elevated PSA     High cholesterol     Hypertension     Prostate cancer        PAST SURGICAL HISTORY:  Past Surgical History:   Procedure Laterality Date    COLON SURGERY      COLONOSCOPY N/A 1/24/2019    Procedure: COLONOSCOPY;  Surgeon: Clifton Whiting MD;  Location: [facility] ([address] [place] [address]);  Service: Endoscopy;  Laterality: N/A;    COLONOSCOPY N/A 9/21/2022    Procedure: COLONOSCOPY;  Surgeon: Rigo Mcmillan MD;  Location: [facility] ([address] [place] [address]);  Service: Colon and Rectal;  Laterality: N/A;  fully vacc, instr. via e-mail, clears 4 hrs prior, pm prep -PC    PROSTATE SURGERY      SKIN CANCER EXCISION         SOCIAL HISTORY:  Social History     Socioeconomic History    Marital status:    Tobacco Use    Smoking status: Never    Smokeless tobacco: Never   Substance and Sexual Activity    Alcohol use: Yes     Alcohol/week: 24.0 standard drinks of alcohol     Types: 20 Glasses of wine, 4 Cans of beer per week     Comment: daily    Drug use: No    Sexual activity: Not Currently     Partners: Female     Social Drivers of Health     Financial Resource Strain: Low Risk  (3/28/2024)    Overall Financial Resource Strain (CARDIA)     Difficulty of Paying Living Expenses: Not hard at all   Food Insecurity: No Food Insecurity  (2/16/2024)    Hunger Vital Sign     Worried About Running Out of Food in the Last Year: Never true     Ran Out of Food in the Last Year: Never true   Transportation Needs: No Transportation Needs (3/28/2024)    PRAPARE - Transportation     Lack of Transportation (Medical): No     Lack of Transportation (Non-Medical): No   Physical Activity: Unknown (2/16/2024)    Exercise Vital Sign     Days of Exercise per Week: 2 days   Stress: Stress Concern Present (2/16/2024)    [employer] [place] [employer] - Occupational Stress Questionnaire     Feeling of Stress : To some extent   Housing Stability: Low Risk  (2/16/2024)    Housing Stability Vital Sign     Unable to Pay for Housing in the Last Year: No     Number of Places Lived in the Last Year: 1     Unstable Housing in the Last Year: No       FAMILY HISTORY:       Family History   Problem Relation Name Age of Onset    Cancer Father esophageal         esophageal CA    Emphysema Mother      Coronary artery disease Maternal Grandfather      Cancer Maternal Uncle colon        ALLERGIES AND MEDICATIONS: updated and reviewed.      274}  Review of patient's allergies indicates:  No Known Allergies  Medication List with Changes/Refills   Current Medications    ALBUTEROL (VENTOLIN HFA) 90 MCG/ACTUATION INHALER    Inhale 2 puffs into the lungs every 6 (six) hours as needed for Wheezing. Rescue    ALPRAZOLAM (XANAX) 0.5 MG TABLET    0.5 mg 2 (two) times daily as needed.     ASPIRIN (ECOTRIN) 81 MG EC TABLET    Take 1 tablet (81 mg total) by mouth once daily.    ATORVASTATIN (LIPITOR) 40 MG TABLET    Take 1 tablet (40 mg total) by mouth once daily.    AZELASTINE (ASTELIN) 137 MCG (0.1 %) NASAL SPRAY    1 spray once daily.    BUPROPION (WELLBUTRIN XL) 150 MG TB24 TABLET    Take 150 mg by mouth every morning.    BUPROPION (WELLBUTRIN XL) 300 MG 24 HR TABLET        CICLOPIROX (LOPROX) 0.77 % CREA    Apply topically 2 (two) times daily.    CLOBETASOL 0.05% (TEMOVATE) 0.05 %  "OINT    Apply topically 2 (two) times daily.    CYMBALTA 60 MG CAPSULE    Take 60 mg by mouth once daily.     HYDROCHLOROTHIAZIDE (HYDRODIURIL) 25 MG TABLET    Take 1 tablet (25 mg total) by mouth once daily.    LEVOTHYROXINE (SYNTHROID) 100 MCG TABLET    Take 1 tablet (100 mcg total) by mouth every morning.    LOSARTAN-HYDROCHLOROTHIAZIDE 100-25 MG (HYZAAR) 100-25 MG PER TABLET    Take 1 tablet by mouth once daily.    TEMAZEPAM (RESTORIL) 30 MG CAPSULE    Take 30 mg by mouth nightly as needed.    TIRZEPATIDE, WEIGHT LOSS, (ZEPBOUND) 2.5 MG/0.5 ML PNIJ    Inject 2.5 mg into the skin every 7 days.    TRAZODONE (DESYREL) 100 MG TABLET    100 mg nightly as needed.        SCREENING HISTORY:    274}  Health Maintenance         Date Due Completion Date    High Dose Statin Never done ---    RSV Vaccine (Age 60+ and Pregnant patients) (1 - Risk 60-74 years 1-dose series) Never done ---    Shingles Vaccine (2 of 3) 02/24/2015 12/30/2014    COVID-19 Vaccine (3 - 2024-25 season) 09/01/2024 3/13/2021    Colorectal Cancer Screening 09/21/2025 9/21/2022    TETANUS VACCINE 06/07/2027 6/7/2017    Lipid Panel 03/27/2029 3/27/2024            REVIEW OF SYSTEMS:   Review of Systems   Constitutional:  Negative for chills, fever, malaise/fatigue and weight loss.       PHYSICAL EXAM:      274}  There were no vitals taken for this visit.  Wt Readings from Last 3 Encounters:   10/03/24 121 kg (266 lb 12.1 oz)   10/03/24 121.8 kg (268 lb 9.6 oz)   06/05/24 121.9 kg (268 lb 10.1 oz)     BP Readings from Last 3 Encounters:   10/03/24 128/79   10/03/24 136/76   06/05/24 136/87     Estimated body mass index is 38.28 kg/m² as calculated from the following:    Height as of 10/3/24: 5' 10" (1.778 m).    Weight as of 10/3/24: 121 kg (266 lb 12.1 oz).     Physical Exam    LABS:   274}  I have reviewed old labs below:  Lab Results   Component Value Date    WBC 7.13 03/27/2024    HGB 14.3 03/27/2024    HCT 44.0 03/27/2024    MCV 98 03/27/2024    PLT " 201 03/27/2024     03/27/2024    K 3.6 03/27/2024     03/27/2024    CALCIUM 9.6 03/27/2024    CO2 28 03/27/2024    GLU 95 03/27/2024    BUN 13 03/27/2024    CREATININE 1.1 05/08/2024    ANIONGAP 9 03/27/2024    ESTGFRAFRICA >60.0 06/24/2021    EGFRNONAA >60.0 06/24/2021    PROT 6.9 03/27/2024    ALBUMIN 3.6 03/27/2024    BILITOT 0.4 03/27/2024    ALKPHOS 63 03/27/2024    ALT 27 03/27/2024    AST 26 03/27/2024    INR 1.1 03/27/2024    CHOL 147 03/27/2024    TRIG 99 03/27/2024    HDL 48 03/27/2024    LDLCALC 79.2 03/27/2024    TSH 2.973 03/27/2024    PSA <0.01 10/18/2022    HGBA1C 5.2 09/06/2023       ASSESSMENT AND PLAN:  274}  Assessment & Plan             1. Primary hypertension    2. Aortic atherosclerosis    3. Class 3 obesity     No orders of the defined types were placed in this encounter.      No follow-ups on file. or sooner as needed.    13 minute encounter

## 2024-11-15 DIAGNOSIS — E66.813 CLASS 3 OBESITY: ICD-10-CM

## 2024-11-15 DIAGNOSIS — I70.0 AORTIC ATHEROSCLEROSIS: ICD-10-CM

## 2024-11-15 DIAGNOSIS — I10 PRIMARY HYPERTENSION: ICD-10-CM

## 2024-11-15 RX ORDER — SEMAGLUTIDE 0.25 MG/.5ML
0.25 INJECTION, SOLUTION SUBCUTANEOUS
Qty: 3 ML | Refills: 0 | Status: SHIPPED | OUTPATIENT
Start: 2024-11-15

## 2024-11-15 NOTE — TELEPHONE ENCOUNTER
Care Due:                  Date            Visit Type   Department     Provider  --------------------------------------------------------------------------------                                EP -                              PRIMARY      [facility] 2ND FLOOR  Last Visit: 10-      [facility]  Next Visit: None Scheduled  None         None Found                                                            Last  Test          Frequency    Reason                     Performed    Due Date  --------------------------------------------------------------------------------    HBA1C.......  6 months...  semaglutide,.............  09- 03-    [facility] Embedded Care Due Messages. Reference number: [id number].   11/15/2024 10:17:16 AM CST

## 2024-11-27 ENCOUNTER — TELEPHONE (OUTPATIENT)
Dept: DERMATOLOGY | Facility: CLINIC | Age: 74
End: 2024-11-27
Payer: MEDICARE

## 2024-11-27 ENCOUNTER — PATIENT MESSAGE (OUTPATIENT)
Dept: DERMATOLOGY | Facility: CLINIC | Age: 74
End: 2024-11-27
Payer: MEDICARE

## 2024-11-27 DIAGNOSIS — C44.311 BASAL CELL CARCINOMA (BCC) OF LEFT ALA NASI: Primary | ICD-10-CM

## 2024-11-27 NOTE — TELEPHONE ENCOUNTER
Pt is referral from Dr. Sharp for [facility] MATTEO wharton. Scheduled for [facility] on 1/9 at 8. Confirmed date, time, and location. Reminder sent in mail.

## 2025-01-02 DIAGNOSIS — E78.5 HYPERLIPIDEMIA, UNSPECIFIED HYPERLIPIDEMIA TYPE: ICD-10-CM

## 2025-01-02 RX ORDER — ATORVASTATIN CALCIUM 40 MG/1
40 TABLET, FILM COATED ORAL DAILY
Qty: 90 TABLET | Refills: 3 | Status: SHIPPED | OUTPATIENT
Start: 2025-01-02 | End: 2026-01-02

## 2025-01-02 NOTE — TELEPHONE ENCOUNTER
Care Due:                  Date            Visit Type   Department     Provider  --------------------------------------------------------------------------------                                EP -                              PRIMARY      [facility] 2ND FLOOR  Last Visit: 10-      [facility]   [facility]  Next Visit: None Scheduled  None         None Found                                                            Last  Test          Frequency    Reason                     Performed    Due Date  --------------------------------------------------------------------------------    CMP.........  12 months..  hydroCHLOROthiazide,       03- 03-                             losartan-hydrochlorothiaz                             harley......................    [employer] Embedded Care Due Messages. Reference number: [id number].   1/02/2025 9:49:28 AM CST

## 2025-01-09 ENCOUNTER — PROCEDURE VISIT (OUTPATIENT)
Dept: DERMATOLOGY | Facility: CLINIC | Age: 75
End: 2025-01-09
Payer: MEDICARE

## 2025-01-09 VITALS — HEART RATE: 69 BPM | DIASTOLIC BLOOD PRESSURE: 75 MMHG | SYSTOLIC BLOOD PRESSURE: 123 MMHG

## 2025-01-09 DIAGNOSIS — C44.311 BASAL CELL CARCINOMA (BCC) OF LEFT ALA NASI: ICD-10-CM

## 2025-01-09 PROCEDURE — 14060 TIS TRNFR E/N/E/L 10 SQ CM/<: CPT | Mod: PBBFAC | Performed by: DERMATOLOGY

## 2025-01-09 PROCEDURE — 17311 MOHS 1 STAGE H/N/HF/G: CPT | Mod: PBBFAC | Performed by: DERMATOLOGY

## 2025-01-09 NOTE — PROGRESS NOTES
PROCEDURE: Mohs' Micrographic Surgery    INDICATION: Location in mask areas of face including central face, nose, eyelids, eyebrows, lips, chin, preauricular, temple, and ear. Biopsy-proven skin cancer of cosmetically and functionally important areas, including head, neck, genital, hand, foot, or areas known for having difficulty in healing, such as the lower anterior legs. Tumor with ill-defined borders. Tumor with aggressive histopathology. Aggressive histopathology including sclerosing, morpheaform/infiltrating, micronodular, superficial multicentric, poorly differentiated, basosquamous, or perineural invasion.    REFERRING PROVIDER: Jorden Sharp III, M.D.    CASE NUMBER:     ANESTHETIC: 3 cc 0.5% Lidocaine with Epi 1:200,000 mixed 1:1 with 0.5% Bupivacaine    SURGICAL PREP: Hibiclens    SURGEON: Ian Trevino MD    ASSISTANTS: Stephy Borges PA-C and Elba Tellez MA    PREOPERATIVE DIAGNOSIS: basal cell carcinoma- nodular, micronodular    POSTOPERATIVE DIAGNOSIS: basal cell carcinoma    PATHOLOGIC DIAGNOSIS: basal cell carcinoma- nodular, micronodular    HISTOLOGY OF SPECIMENS IN FIRST STAGE:   Tumor Type:  No tumor seen.    STAGES OF MOHS' SURGERY PERFORMED: 1    TUMOR-FREE PLANE ACHIEVED: Yes    HEMOSTASIS: electrocoagulation     SPECIMENS: 2     LOCATION: left ala nasi (lateral at cheek junction). Location verified with Dr. Sharp's clinical photograph. Patient also verified location with hand held mirror.    INITIAL LESION SIZE: 0.4 x 0.4 cm    FINAL DEFECT SIZE: 0.6 x 0.9 cm    WOUND REPAIR/DISPOSITION: The patient tolerated Mohs' Micrographic Surgery for a basal cell carcinoma very well. When the tumor was completely removed, a repair of the surgical defect was undertaken.        PROCEDURE: Perialar Crescentic Advancement Flap (Adjacent Tissue Transfer)    INDICATION: Status post Mohs' Micrographic Surgery for basal cell carcinoma.    CASE NUMBER:     SURGEON: Ian Trevino  MD    ASSISTANTS: Stephy Borges PA-C and Elba Tellez MA     ANESTHETIC: 2 cc 1% Lidocaine with Epinephrine 1:100,000    SURGICAL PREP: Hibiclens, prepped by Elba Tellez MA    LOCATION: left ala nasi (lateral at cheek junction)    DEFECT SIZE: 0.6 x 0.9 cm    WOUND REPAIR/DISPOSITION:  After the patient's carcinoma had been completely removed with Mohs' Micrographic Surgery, a repair of the surgical defect was undertaken. The patient was returned to the operating suite where the area of left ala nasi was prepped, draped, and anesthetized in the usual sterile fashion. A perialar crescentic advancement flap was designed in the surrounding tissue of the left alar groove. A Burow's triangle was drawn in inferiorly to help with tissue movement. Then with a #15 blade the flap was incised and the Burow's triangle was excised, the area was widely undermined in the subcutaneous tissue plane. Then, electrocoagulation was used to obtain meticulous hemostasis. A 4-0 Vicryl pexing suture was performed by attaching the underside of the most medial aspect of the flap to the left lateral ala. The flap was then advanced in by a complex closure. Then, 4-0 Vicryl sutures were placed into the subcutaneous and dermal plane to close the wound and treva the cutaneous wound edge. The flap was then trimmed to fit the defect. The cutaneous wound edges were closed using interrupted 5-0 Prolene sutures.    The patient tolerated the procedure well.    The area was cleaned and dressed appropriately and the patient was given wound care instructions, as well as an appointment for follow-up evaluation and suture removal in 7 days.    SIZE OF FLAP: 2 cm squared    Vitals:    01/09/25 0746 01/09/25 1014   BP: 124/77 123/75   BP Location: Right arm    Patient Position: Sitting    Pulse: 74 69

## 2025-01-16 ENCOUNTER — OFFICE VISIT (OUTPATIENT)
Dept: DERMATOLOGY | Facility: CLINIC | Age: 75
End: 2025-01-16
Payer: MEDICARE

## 2025-01-16 DIAGNOSIS — Z09 POSTOP CHECK: Primary | ICD-10-CM

## 2025-01-16 PROCEDURE — 99024 POSTOP FOLLOW-UP VISIT: CPT | Mod: POP,,, | Performed by: DERMATOLOGY

## 2025-01-16 PROCEDURE — 99213 OFFICE O/P EST LOW 20 MIN: CPT | Mod: PBBFAC | Performed by: DERMATOLOGY

## 2025-01-16 PROCEDURE — 99999 PR PBB SHADOW E&M-EST. PATIENT-LVL III: CPT | Mod: PBBFAC,,, | Performed by: DERMATOLOGY

## 2025-01-16 NOTE — PROGRESS NOTES
74 y.o. male patient is here for suture removal following Mohs' surgery.    Patient reports no problems.    WOUND PE:  The left ala nasi sutures intact. Wound healing well. Good skin edges. No signs or symptoms of infection. Flap is pink and viable.     IMPRESSION:  Healing operative site from Mohs' surgery BCC left ala nasi s/p Mohs with Perialar Crescentic Advancement Flap, postop day #7.    PLAN:  Sutures removed today by  Stephani Cortez MA . Steri-strips applied.  Continue wound care.  Keep moist with Aquaphor.  Call if any issues arise    RTC:  In 3-6 months with Jorden Sharp III, M.D. for skin check or sooner if new concern arises.

## 2025-02-04 ENCOUNTER — E-VISIT (OUTPATIENT)
Dept: FAMILY MEDICINE | Facility: CLINIC | Age: 75
End: 2025-02-04
Payer: MEDICARE

## 2025-02-04 DIAGNOSIS — E66.813 CLASS 3 OBESITY: ICD-10-CM

## 2025-02-04 DIAGNOSIS — E66.01 SEVERE OBESITY (BMI 35.0-39.9) WITH COMORBIDITY: ICD-10-CM

## 2025-02-04 DIAGNOSIS — I70.0 AORTIC ATHEROSCLEROSIS: ICD-10-CM

## 2025-02-04 DIAGNOSIS — E05.90 HYPERTHYROIDISM: ICD-10-CM

## 2025-02-04 DIAGNOSIS — E03.9 ACQUIRED HYPOTHYROIDISM: Primary | ICD-10-CM

## 2025-02-04 DIAGNOSIS — I10 PRIMARY HYPERTENSION: ICD-10-CM

## 2025-02-04 DIAGNOSIS — F33.41 MAJOR DEPRESSIVE DISORDER, RECURRENT, IN PARTIAL REMISSION: ICD-10-CM

## 2025-02-04 PROCEDURE — 99422 OL DIG E/M SVC 11-20 MIN: CPT | Mod: ,,, | Performed by: STUDENT IN AN ORGANIZED HEALTH CARE EDUCATION/TRAINING PROGRAM

## 2025-02-04 RX ORDER — LEVOTHYROXINE SODIUM 100 UG/1
100 TABLET ORAL EVERY MORNING
Qty: 90 TABLET | Refills: 3 | Status: SHIPPED | OUTPATIENT
Start: 2025-02-04

## 2025-02-04 RX ORDER — SEMAGLUTIDE 0.25 MG/.5ML
0.25 INJECTION, SOLUTION SUBCUTANEOUS
Qty: 3 ML | Refills: 0 | Status: SHIPPED | OUTPATIENT
Start: 2025-02-04

## 2025-02-04 NOTE — PROGRESS NOTES
SUBJECTIVE:    CHIEF COMPLAINT:   Chief Complaint   Patient presents with    General Illness     Entered automatically based on patient selection in [employer].           274}    HISTORY OF PRESENT ILLNESS:  Adolfo Vazquez is a 74 y.o. male who presents to the clinic today for medication refill.     We have discussed a trial of Wegovy for obesity, which they are agreeable to. No personal or family history of thyroid cancer, or pancreatitis. The patient is agreeable to healthy eating, increased ERIN recommending at least 30 min a day on most days. Patient is also agreeable to behavior and mindset changes. Discussed starting Wegovy at 0.25 mg and titrating up as tolerated and continuing to monitor her weight with an every 3 month follow up at [facility]. Patient has tried portion control, calorie deficit diet, increased physical activity with increased brisk walking. `    PAST MEDICAL HISTORY:     274}  Past Medical History:   Diagnosis Date    Anxiety     BCC (basal cell carcinoma) 01/09/2025    L ala nasi    Colon polyp     benign    Depression     Elevated PSA     High cholesterol     Hypertension     Prostate cancer        PAST SURGICAL HISTORY:  Past Surgical History:   Procedure Laterality Date    COLON SURGERY      COLONOSCOPY N/A 1/24/2019    Procedure: COLONOSCOPY;  Surgeon: Clifton Whiting MD;  Location: [address]);  Service: Endoscopy;  Laterality: N/A;    COLONOSCOPY N/A 9/21/2022    Procedure: COLONOSCOPY;  Surgeon: Rigo Mcmillan MD;  Location: [address]);  Service: Colon and Rectal;  Laterality: N/A;  fully vacc, instr. via e-mail, clears 4 hrs prior, pm prep -PC    PROSTATE SURGERY      SKIN CANCER EXCISION         SOCIAL HISTORY:  Social History     Socioeconomic History    Marital status:    Tobacco Use    Smoking status: Never    Smokeless tobacco: Never   Substance and Sexual Activity    Alcohol use: Yes     Alcohol/week: 24.0 standard drinks of alcohol     Types: 20  Glasses of wine, 4 Cans of beer per week     Comment: daily    Drug use: No    Sexual activity: Not Currently     Partners: Female     Social Drivers of Health     Financial Resource Strain: Low Risk  (3/28/2024)    Overall Financial Resource Strain (CARDIA)     Difficulty of Paying Living Expenses: Not hard at all   Food Insecurity: No Food Insecurity (2/16/2024)    Hunger Vital Sign     Worried About Running Out of Food in the Last Year: Never true     Ran Out of Food in the Last Year: Never true   Transportation Needs: No Transportation Needs (3/28/2024)    PRAPARE - Transportation     Lack of Transportation (Medical): No     Lack of Transportation (Non-Medical): No   Physical Activity: Unknown (2/16/2024)    Exercise Vital Sign     Days of Exercise per Week: 2 days   Stress: Stress Concern Present (2/16/2024)    [employer] - Occupational Stress Questionnaire     Feeling of Stress : To some extent   Housing Stability: Low Risk  (2/16/2024)    Housing Stability Vital Sign     Unable to Pay for Housing in the Last Year: No     Number of Places Lived in the Last Year: 1     Unstable Housing in the Last Year: No       FAMILY HISTORY:       Family History   Problem Relation Name Age of Onset    Cancer Father esophageal         esophageal CA    Emphysema Mother      Coronary artery disease Maternal Grandfather      Cancer Maternal Uncle colon        ALLERGIES AND MEDICATIONS: updated and reviewed.      274}  Review of patient's allergies indicates:  No Known Allergies  Medication List with Changes/Refills   Current Medications    ALPRAZOLAM (XANAX) 0.5 MG TABLET    0.5 mg 2 (two) times daily as needed.     ASPIRIN (ECOTRIN) 81 MG EC TABLET    Take 1 tablet (81 mg total) by mouth once daily.    ATORVASTATIN (LIPITOR) 40 MG TABLET    Take 1 tablet (40 mg total) by mouth once daily.    BUPROPION (WELLBUTRIN XL) 150 MG TB24 TABLET    Take 150 mg by mouth every morning.    BUPROPION (WELLBUTRIN  XL) 300 MG 24 HR TABLET        CICLOPIROX (LOPROX) 0.77 % CREA    Apply topically 2 (two) times daily.    CLOBETASOL 0.05% (TEMOVATE) 0.05 % OINT    Apply topically 2 (two) times daily.    CYMBALTA 60 MG CAPSULE    Take 60 mg by mouth once daily.     HYDROCHLOROTHIAZIDE (HYDRODIURIL) 25 MG TABLET    Take 1 tablet (25 mg total) by mouth once daily.    LOSARTAN-HYDROCHLOROTHIAZIDE 100-25 MG (HYZAAR) 100-25 MG PER TABLET    Take 1 tablet by mouth once daily.    TEMAZEPAM (RESTORIL) 30 MG CAPSULE    Take 30 mg by mouth nightly as needed.    TRAZODONE (DESYREL) 100 MG TABLET    100 mg nightly as needed.    Changed and/or Refilled Medications    Modified Medication Previous Medication    LEVOTHYROXINE (SYNTHROID) 100 MCG TABLET levothyroxine (SYNTHROID) 100 MCG tablet       Take 1 tablet (100 mcg total) by mouth every morning.    Take 1 tablet (100 mcg total) by mouth every morning.    SEMAGLUTIDE, WEIGHT LOSS, (WEGOVY) 0.25 MG/0.5 ML PNIJ semaglutide, weight loss, (WEGOVY) 0.25 mg/0.5 mL PnIj       Inject 0.25 mg into the skin every 7 days.    Inject 0.25 mg into the skin every 7 days.   Discontinued Medications    ALBUTEROL (VENTOLIN HFA) 90 MCG/ACTUATION INHALER    Inhale 2 puffs into the lungs every 6 (six) hours as needed for Wheezing. Rescue    AZELASTINE (ASTELIN) 137 MCG (0.1 %) NASAL SPRAY    1 spray once daily.       SCREENING HISTORY:    274}  Health Maintenance         Date Due Completion Date    Shingles Vaccine (1 of 2) 02/24/2015 12/30/2014    COVID-19 Vaccine (3 - Moderna risk series) 04/10/2021 3/13/2021    RSV Vaccine (Age 60+ and Pregnant patients) (1 - 1-dose 75+ series) Never done ---    Colorectal Cancer Screening 09/21/2025 9/21/2022    High Dose Statin 01/16/2026 1/16/2025    TETANUS VACCINE 06/07/2027 6/7/2017    Lipid Panel 03/27/2029 3/27/2024            REVIEW OF SYSTEMS:   Review of Systems   Constitutional:  Negative for chills, fever, malaise/fatigue and weight loss.       PHYSICAL EXAM:      " 274}  There were no vitals taken for this visit.  Wt Readings from Last 3 Encounters:   10/03/24 121 kg (266 lb 12.1 oz)   10/03/24 121.8 kg (268 lb 9.6 oz)   06/05/24 121.9 kg (268 lb 10.1 oz)     BP Readings from Last 3 Encounters:   01/09/25 123/75   10/03/24 128/79   10/03/24 136/76     Estimated body mass index is 38.28 kg/m² as calculated from the following:    Height as of 10/3/24: 5' 10" (1.778 m).    Weight as of 10/3/24: 121 kg (266 lb 12.1 oz).     Physical Exam    LABS:   274}  I have reviewed old labs below:  Lab Results   Component Value Date    WBC 7.13 03/27/2024    HGB 14.3 03/27/2024    HCT 44.0 03/27/2024    MCV 98 03/27/2024     03/27/2024     03/27/2024    K 3.6 03/27/2024     03/27/2024    CALCIUM 9.6 03/27/2024    CO2 28 03/27/2024    GLU 95 03/27/2024    BUN 13 03/27/2024    CREATININE 1.1 05/08/2024    ANIONGAP 9 03/27/2024    ESTGFRAFRICA >60.0 06/24/2021    EGFRNONAA >60.0 06/24/2021    PROT 6.9 03/27/2024    ALBUMIN 3.6 03/27/2024    BILITOT 0.4 03/27/2024    ALKPHOS 63 03/27/2024    ALT 27 03/27/2024    AST 26 03/27/2024    INR 1.1 03/27/2024    CHOL 147 03/27/2024    TRIG 99 03/27/2024    HDL 48 03/27/2024    LDLCALC 79.2 03/27/2024    TSH 2.973 03/27/2024    PSA <0.01 10/18/2022    HGBA1C 5.2 09/06/2023       ASSESSMENT AND PLAN:  274}  Assessment & Plan             1. Acquired hypothyroidism    2. Hyperthyroidism  - levothyroxine (SYNTHROID) 100 MCG tablet; Take 1 tablet (100 mcg total) by mouth every morning.  Dispense: 90 tablet; Refill: 3    3. Severe obesity (BMI 35.0-39.9) with comorbidity    4. Major depressive disorder, recurrent, in partial remission    5. Aortic atherosclerosis  - semaglutide, weight loss, (WEGOVY) 0.25 mg/0.5 mL PnIj; Inject 0.25 mg into the skin every 7 days.  Dispense: 3 mL; Refill: 0    6. Primary hypertension  - semaglutide, weight loss, (WEGOVY) 0.25 mg/0.5 mL PnIj; Inject 0.25 mg into the skin every 7 days.  Dispense: 3 mL; Refill: " 0    7. Class 3 obesity  - semaglutide, weight loss, (WEGOVY) 0.25 mg/0.5 mL PnIj; Inject 0.25 mg into the skin every 7 days.  Dispense: 3 mL; Refill: 0         No orders of the defined types were placed in this encounter.      No follow-ups on file. or sooner as needed.      12 minute encounter

## 2025-02-11 ENCOUNTER — PATIENT MESSAGE (OUTPATIENT)
Dept: ADMINISTRATIVE | Facility: OTHER | Age: 75
End: 2025-02-11
Payer: MEDICARE

## 2025-02-22 DIAGNOSIS — Z00.00 ENCOUNTER FOR MEDICARE ANNUAL WELLNESS EXAM: ICD-10-CM

## 2025-04-09 ENCOUNTER — TELEPHONE (OUTPATIENT)
Dept: NEUROSURGERY | Facility: CLINIC | Age: 75
End: 2025-04-09
Payer: MEDICARE

## 2025-04-09 DIAGNOSIS — D33.3 LEFT ACOUSTIC NEUROMA: Primary | ICD-10-CM

## 2025-04-09 NOTE — TELEPHONE ENCOUNTER
S/W pt to schedule f/u visit. Offered 6/10 at 1PM at [facility] with Ledy with MRI brain on 5/23 at 0930 at [facility]. Pt verbalized agreement with time and dates.

## 2025-04-09 NOTE — TELEPHONE ENCOUNTER
----- Message from Lianne sent at 4/9/2025  9:06 AM CDT -----  Regarding: Pt called states he received a letter to sche a F/U appt for May unable to do so  Contact: [phone]  Name of Who is Calling:KERRY KHAN [[id number]]  What is the request in detail:Pt called states he received a letter to sche a F/U appt for May unable to do so. Please advise   Can the clinic reply by [facility]:No  [facility] Telephone Information:Mobile          [phone]

## 2025-05-19 ENCOUNTER — HOSPITAL ENCOUNTER (OUTPATIENT)
Dept: RADIOLOGY | Facility: HOSPITAL | Age: 75
Discharge: HOME | End: 2025-05-19
Attending: NEUROLOGICAL SURGERY
Payer: MEDICARE

## 2025-05-19 DIAGNOSIS — D33.3 LEFT ACOUSTIC NEUROMA: ICD-10-CM

## 2025-05-19 LAB
CREAT SERPL-MCNC: 1.1 MG/DL (ref 0.5–1.4)
SAMPLE: NORMAL

## 2025-05-19 PROCEDURE — 70553 MRI BRAIN STEM W/O & W/DYE: CPT | Mod: 26,,, | Performed by: RADIOLOGY

## 2025-05-19 PROCEDURE — 70553 MRI BRAIN STEM W/O & W/DYE: CPT | Mod: TC

## 2025-05-19 PROCEDURE — A9585 GADOBUTROL INJECTION: HCPCS

## 2025-05-19 PROCEDURE — 25500020 PHARM REV CODE 255

## 2025-05-19 RX ORDER — GADOBUTROL 604.72 MG/ML
INJECTION INTRAVENOUS
Status: COMPLETED
Start: 2025-05-19 | End: 2025-05-19

## 2025-05-19 RX ADMIN — GADOBUTROL 10 ML: 604.72 INJECTION INTRAVENOUS at 12:05

## 2025-05-20 ENCOUNTER — RESULTS FOLLOW-UP (OUTPATIENT)
Dept: FAMILY MEDICINE | Facility: CLINIC | Age: 75
End: 2025-05-20

## 2025-05-20 ENCOUNTER — HOSPITAL ENCOUNTER (OUTPATIENT)
Dept: CARDIOLOGY | Facility: HOSPITAL | Age: 75
Discharge: HOME | End: 2025-05-20
Attending: STUDENT IN AN ORGANIZED HEALTH CARE EDUCATION/TRAINING PROGRAM
Payer: MEDICARE

## 2025-05-20 ENCOUNTER — OFFICE VISIT (OUTPATIENT)
Dept: FAMILY MEDICINE | Facility: CLINIC | Age: 75
End: 2025-05-20
Payer: MEDICARE

## 2025-05-20 VITALS
OXYGEN SATURATION: 96 % | BODY MASS INDEX: 35.91 KG/M2 | RESPIRATION RATE: 16 BRPM | HEART RATE: 61 BPM | WEIGHT: 250.81 LBS | HEIGHT: 70 IN | DIASTOLIC BLOOD PRESSURE: 86 MMHG | SYSTOLIC BLOOD PRESSURE: 128 MMHG

## 2025-05-20 DIAGNOSIS — I70.0 AORTIC ATHEROSCLEROSIS: ICD-10-CM

## 2025-05-20 DIAGNOSIS — I10 PRIMARY HYPERTENSION: ICD-10-CM

## 2025-05-20 DIAGNOSIS — F33.41 MAJOR DEPRESSIVE DISORDER, RECURRENT, IN PARTIAL REMISSION: ICD-10-CM

## 2025-05-20 DIAGNOSIS — E03.9 ACQUIRED HYPOTHYROIDISM: Primary | ICD-10-CM

## 2025-05-20 DIAGNOSIS — E78.5 HYPERLIPIDEMIA, UNSPECIFIED HYPERLIPIDEMIA TYPE: ICD-10-CM

## 2025-05-20 DIAGNOSIS — I25.10 ATHEROSCLEROSIS OF NATIVE CORONARY ARTERY OF NATIVE HEART WITHOUT ANGINA PECTORIS: ICD-10-CM

## 2025-05-20 DIAGNOSIS — R79.9 ABNORMAL BLOOD CHEMISTRY: ICD-10-CM

## 2025-05-20 DIAGNOSIS — E66.813 CLASS 3 OBESITY: ICD-10-CM

## 2025-05-20 DIAGNOSIS — E66.01 SEVERE OBESITY (BMI 35.0-39.9) WITH COMORBIDITY: ICD-10-CM

## 2025-05-20 DIAGNOSIS — E55.9 VITAMIN D DEFICIENCY DISEASE: ICD-10-CM

## 2025-05-20 PROCEDURE — 99999 PR PBB SHADOW E&M-EST. PATIENT-LVL IV: CPT | Mod: PBBFAC,,, | Performed by: STUDENT IN AN ORGANIZED HEALTH CARE EDUCATION/TRAINING PROGRAM

## 2025-05-20 PROCEDURE — 93010 ELECTROCARDIOGRAM REPORT: CPT | Mod: ,,, | Performed by: INTERNAL MEDICINE

## 2025-05-20 PROCEDURE — 93005 ELECTROCARDIOGRAM TRACING: CPT

## 2025-05-20 PROCEDURE — 99214 OFFICE O/P EST MOD 30 MIN: CPT | Mod: PBBFAC | Performed by: STUDENT IN AN ORGANIZED HEALTH CARE EDUCATION/TRAINING PROGRAM

## 2025-05-20 PROCEDURE — 99214 OFFICE O/P EST MOD 30 MIN: CPT | Mod: S$PBB,,, | Performed by: STUDENT IN AN ORGANIZED HEALTH CARE EDUCATION/TRAINING PROGRAM

## 2025-05-20 RX ORDER — POTASSIUM CHLORIDE 20 MEQ/1
20 TABLET, EXTENDED RELEASE ORAL DAILY
Qty: 90 TABLET | Refills: 3 | Status: SHIPPED | OUTPATIENT
Start: 2025-05-20

## 2025-05-20 RX ORDER — SEMAGLUTIDE 0.25 MG/.5ML
0.25 INJECTION, SOLUTION SUBCUTANEOUS
Qty: 3 ML | Refills: 0 | Status: SHIPPED | OUTPATIENT
Start: 2025-05-20

## 2025-05-20 NOTE — PROGRESS NOTES
SUBJECTIVE:    CHIEF COMPLAINT:   Chief Complaint   Patient presents with    Loss of Consciousness     05/17/25 fainted  Confused    Had MRI 05/20/2025            274}    HISTORY OF PRESENT ILLNESS:  Adolfo Vazquez is a 74 y.o. male who presents to the clinic today   History of Present Illness    CHIEF COMPLAINT:  Mr. Vazquez presents today following a recent fainting episode at a wedding reception.    HISTORY OF PRESENT ILLNESS:  He experienced a syncopal episode at an outdoor wedding reception in hot weather conditions. He felt lightheaded while walking on grass before losing consciousness and falling backwards, hitting his head. Loss of consciousness lasted less than one minute. Contributing factors included not eating all day until the incident (around 7:30-8:00 PM) and consumption of two glasses of champagne over an hour. Doctors present at the scene provided immediate care, including removing his sport coat and providing water for cooling. EMTs subsequently evaluated him. He denies experiencing palpitations, chest pain, shortness of breath, blurry vision, or auditory disturbances prior to the fall. He reports feeling weak since the incident.    NEUROLOGICAL:  He experiences balance problems due to a schwannoma on the left side. Brain MRI was performed yesterday.    VASCULAR:  He has plaque buildup in his arteries. Previous imaging studies showed two questionable posterior brain arteries.    PSYCHIATRIC:  He sees psychiatrist every 90 days for mental health management and reports significant distress due to recent family turmoil over the past few months.      ROS:  General: -fever, -chills, -fatigue, -weight gain, -weight loss, +weakness  Eyes: -vision changes, -redness, -discharge, +blurry vision  ENT: -ear pain, -nasal congestion, -sore throat  Cardiovascular: -chest pain, -palpitations, -lower extremity edema  Respiratory: -cough, -shortness of breath  Gastrointestinal: -abdominal pain, -nausea, -vomiting,  -diarrhea, -constipation, -blood in stool  Genitourinary: -dysuria, -hematuria, -frequency  Musculoskeletal: -joint pain, -muscle pain  Skin: -rash, -lesion  Neurological: -headache, -dizziness, -numbness, -tingling, +syncope, +loss of consciousness, +lightheadedness  Psychiatric: -anxiety, -depression, -sleep difficulty          PAST MEDICAL HISTORY:     274}  Past Medical History:   Diagnosis Date    Anxiety     BCC (basal cell carcinoma) 01/09/2025    L ala nasi    Colon polyp     benign    Depression     Elevated PSA     High cholesterol     Hypertension     Prostate cancer        PAST SURGICAL HISTORY:  Past Surgical History:   Procedure Laterality Date    COLON SURGERY      COLONOSCOPY N/A 1/24/2019    Procedure: COLONOSCOPY;  Surgeon: Clifton Whiting MD;  Location: [facility] ENDO (4TH FLR);  Service: Endoscopy;  Laterality: N/A;    COLONOSCOPY N/A 9/21/2022    Procedure: COLONOSCOPY;  Surgeon: Rigo Mcmillan MD;  Location: [facility] ENDO (4TH FLR);  Service: Colon and Rectal;  Laterality: N/A;  fully vacc, instr. via e-mail, clears 4 hrs prior, pm prep -PC    PROSTATE SURGERY      SKIN CANCER EXCISION         SOCIAL HISTORY:  Social History[1]    FAMILY HISTORY:       Family History   Problem Relation Name Age of Onset    Cancer Father esophageal         esophageal CA    Emphysema Mother      Coronary artery disease Maternal Grandfather      Cancer Maternal Uncle colon        ALLERGIES AND MEDICATIONS: updated and reviewed.      274}  Review of patient's allergies indicates:  No Known Allergies  Medication List with Changes/Refills   New Medications    POTASSIUM CHLORIDE SA (K-DUR,KLOR-CON) 20 MEQ TABLET    Take 1 tablet (20 mEq total) by mouth once daily.   Current Medications    ALPRAZOLAM (XANAX) 0.5 MG TABLET    0.5 mg 2 (two) times daily as needed.     ASPIRIN (ECOTRIN) 81 MG EC TABLET    Take 1 tablet (81 mg total) by mouth once daily.    ATORVASTATIN (LIPITOR) 40 MG TABLET    Take 1 tablet (40 mg total)  "by mouth once daily.    BUPROPION (WELLBUTRIN XL) 150 MG TB24 TABLET    Take 150 mg by mouth every morning.    BUPROPION (WELLBUTRIN XL) 300 MG 24 HR TABLET        CICLOPIROX (LOPROX) 0.77 % CREA    Apply topically 2 (two) times daily.    CLOBETASOL 0.05% (TEMOVATE) 0.05 % OINT    Apply topically 2 (two) times daily.    CYMBALTA 60 MG CAPSULE    Take 60 mg by mouth once daily.     HYDROCHLOROTHIAZIDE (HYDRODIURIL) 25 MG TABLET    Take 1 tablet (25 mg total) by mouth once daily.    LEVOTHYROXINE (SYNTHROID) 100 MCG TABLET    Take 1 tablet (100 mcg total) by mouth every morning.    LOSARTAN-HYDROCHLOROTHIAZIDE 100-25 MG (HYZAAR) 100-25 MG PER TABLET    Take 1 tablet by mouth once daily.    TEMAZEPAM (RESTORIL) 30 MG CAPSULE    Take 30 mg by mouth nightly as needed.    TRAZODONE (DESYREL) 100 MG TABLET    100 mg nightly as needed.    Changed and/or Refilled Medications    Modified Medication Previous Medication    SEMAGLUTIDE, WEIGHT LOSS, (WEGOVY) 0.25 MG/0.5 ML PNIJ semaglutide, weight loss, (WEGOVY) 0.25 mg/0.5 mL PnIj       Inject 0.25 mg into the skin every 7 days.    Inject 0.25 mg into the skin every 7 days.       SCREENING HISTORY:    274}  Health Maintenance         Date Due Completion Date    Shingles Vaccine (2 of 3) 02/24/2015 12/30/2014    COVID-19 Vaccine (3 - 2024-25 season) 09/01/2024 3/13/2021    RSV Vaccine (Age 60+ and Pregnant patients) (1 - 1-dose 75+ series) Never done ---    Colorectal Cancer Screening 09/21/2025 9/21/2022    High Dose Statin 05/20/2026 5/20/2025    TETANUS VACCINE 06/07/2027 6/7/2017    Lipid Panel 05/20/2030 5/20/2025            REVIEW OF SYSTEMS:   ROS    PHYSICAL EXAM:      274}  /86 (BP Location: Left arm, Patient Position: Sitting)   Pulse 61   Resp 16   Ht 5' 10" (1.778 m)   Wt 113.8 kg (250 lb 12.8 oz)   SpO2 96%   BMI 35.99 kg/m²   Wt Readings from Last 3 Encounters:   05/20/25 113.8 kg (250 lb 12.8 oz)   10/03/24 121 kg (266 lb 12.1 oz)   10/03/24 121.8 kg " "(268 lb 9.6 oz)     BP Readings from Last 3 Encounters:   05/20/25 128/86   01/09/25 123/75   10/03/24 128/79     Estimated body mass index is 35.99 kg/m² as calculated from the following:    Height as of this encounter: 5' 10" (1.778 m).    Weight as of this encounter: 113.8 kg (250 lb 12.8 oz).     Physical Exam  HENT:      Head: Normocephalic and atraumatic.      Nose: Nose normal.      Mouth/Throat:      Mouth: Mucous membranes are dry.      Pharynx: Oropharynx is clear.   Eyes:      Extraocular Movements: Extraocular movements intact.      Conjunctiva/sclera: Conjunctivae normal.   Cardiovascular:      Rate and Rhythm: Normal rate and regular rhythm.   Pulmonary:      Effort: Pulmonary effort is normal.      Breath sounds: Normal breath sounds.   Abdominal:      General: Bowel sounds are normal.      Palpations: Abdomen is soft.   Musculoskeletal:         General: Normal range of motion.      Cervical back: Normal range of motion.   Skin:     General: Skin is warm.   Neurological:      General: No focal deficit present.      Mental Status: He is alert. Mental status is at baseline.   Psychiatric:         Mood and Affect: Mood normal.         Thought Content: Thought content normal.         LABS:   274}  I have reviewed old labs below:  Lab Results   Component Value Date    WBC 6.40 05/20/2025    HGB 17.1 05/20/2025    HCT 50.1 05/20/2025    MCV 93 05/20/2025     05/20/2025     05/20/2025    K 3.3 (L) 05/20/2025     05/20/2025    CALCIUM 9.9 05/20/2025    CO2 27 05/20/2025    GLU 98 05/20/2025    BUN 18 05/20/2025    CREATININE 0.9 05/20/2025    ANIONGAP 11 05/20/2025    ESTGFRAFRICA >60.0 06/24/2021    EGFRNONAA >60.0 06/24/2021    PROT 7.1 05/20/2025    ALBUMIN 3.9 05/20/2025    BILITOT 0.5 05/20/2025    ALKPHOS 65 05/20/2025    ALT 21 05/20/2025    AST 21 05/20/2025    INR 1.1 03/27/2024    CHOL 155 05/20/2025    TRIG 99 05/20/2025    HDL 52 05/20/2025    LDLCALC 83.2 05/20/2025    TSH " 1.618 05/20/2025    PSA <0.01 10/18/2022    HGBA1C 5.3 05/20/2025       ASSESSMENT AND PLAN:  274}  Assessment & Plan    IMPRESSION:  Assessed recent syncopal episode at outdoor wedding reception, likely due to combination of factors including heat, prolonged standing, lack of food intake, and alcohol consumption.  Determined extensive workup not immediately necessary given identifiable precipitating factors.  Considered history of schwannoma-related balance issues as potential contributing factor.  Reviewed recent brain MRI results, noting changes from previous imaging.  Evaluated cardiovascular risk factors, including history of atherosclerosis.    SEVERE OBESITY (BMI 35.0-39.9) WITH COMORBIDITY:  - Initiated Wegovy for weight loss and submitted prescription, pending insurance approval.    SYNCOPE AND COLLAPSE:  - Ordered EKG.  - Mr. Vazquez fainted at an outdoor wedding reception under a tent due to heat exposure and inadequate nutrition throughout the day.  - Mr. Vazquez experienced loss of consciousness without abnormal movements or seizure activity.  - Determined extensive workup is unnecessary as the etiology appears environmental.  - Ordered CBC, CMP, and electrolyte panel to assess for dehydration and electrolyte abnormalities as part of a comprehensive evaluation.  - EKG order    FALL:  - Mr. Vazquez fell at the wedding reception due to heat exposure, inadequate nutrition, and difficulty ambulating on grass due to underlying balance impairment.  - The fall resulted in head impact but without seizure activity or abnormal movements.  - No extensive workup needed as the etiology is clear.    HEAD INJURY:  - Mr. Vazquez sustained head impact from falling at the wedding reception.  - Evaluation confirmed absence of seizure activity or abnormal movements following the injury.    HEAT EXPOSURE:  - Mr. Vazquez was exposed to excessive heat at the outdoor wedding reception, which combined with inadequate nutrition  throughout the day, contributed to the syncope episode.    ALCOHOL USE:  - Mr. Vazquez consumed approximately 2 glasses of champagne over a one-hour period at the wedding reception.  - Mr. Vazquez remained asymptomatic after alcohol consumption until experiencing lightheadedness.    BENIGN NEOPLASM OF CRANIAL NERVES (SCHWANNOMA):  - Mr. Vazquez has balance disturbance resulting from a schwannoma on the left side.  - Reviewed recent brain MRI performed yesterday and noted radiological changes.  - Mr. Vazquez is scheduled for neurosurgical follow up in August based on the MRI findings.    UNSTEADINESS ON FEET:  - Balance disturbance is related to the left-sided schwannoma as noted above.    ATHEROSCLEROSIS:  - Mr. Vazquez has arterial plaque accumulation with 2 cerebral arteries noted as questionable.  - Identified a minor deviation in the right cerebral artery, noted as not significantly abnormal.  - Submitted Wegovy prescription with anticipation of insurance coverage based on arterial plaque accumulation.    FOLLOW-UP:  - Instructed patient to follow up with neurosurgeon in August and cardiologist Dr. Churchill as scheduled.        1. Severe obesity (BMI 35.0-39.9) with comorbidity    2. Major depressive disorder, recurrent, in partial remission  - CBC Auto Differential; Future  - Comprehensive Metabolic Panel; Future    3. Aortic atherosclerosis  - semaglutide, weight loss, (WEGOVY) 0.25 mg/0.5 mL PnIj; Inject 0.25 mg into the skin every 7 days.  Dispense: 3 mL; Refill: 0    4. Acquired hypothyroidism  - TSH; Future  - semaglutide, weight loss, (WEGOVY) 0.25 mg/0.5 mL PnIj; Inject 0.25 mg into the skin every 7 days.  Dispense: 3 mL; Refill: 0    5. Primary hypertension  - Microalbumin/Creatinine Ratio, Urine; Future  - SCHEDULED EKG 12-LEAD (to Muse); Future  - semaglutide, weight loss, (WEGOVY) 0.25 mg/0.5 mL PnIj; Inject 0.25 mg into the skin every 7 days.  Dispense: 3 mL; Refill: 0    6. Vitamin D deficiency disease  -  Vitamin D 25-Hydroxy; Future    7. Hyperlipidemia, unspecified hyperlipidemia type  - Lipid Panel; Future    8. Abnormal blood chemistry  - Hemoglobin A1C; Future    9. Atherosclerosis of native coronary artery of native heart without angina pectoris  - semaglutide, weight loss, (WEGOVY) 0.25 mg/0.5 mL PnIj; Inject 0.25 mg into the skin every 7 days.  Dispense: 3 mL; Refill: 0    10. Class 3 obesity  - semaglutide, weight loss, (WEGOVY) 0.25 mg/0.5 mL PnIj; Inject 0.25 mg into the skin every 7 days.  Dispense: 3 mL; Refill: 0         Orders Placed This Encounter   Procedures    CBC Auto Differential    Comprehensive Metabolic Panel    Hemoglobin A1C    Lipid Panel    Microalbumin/Creatinine Ratio, Urine    TSH    Vitamin D 25-Hydroxy    SCHEDULED EKG 12-LEAD (to Muse)       Follow up in about 1 year (around 5/20/2026). or sooner as needed.                 [1]   Social History  Socioeconomic History    Marital status:    Tobacco Use    Smoking status: Never    Smokeless tobacco: Never   Substance and Sexual Activity    Alcohol use: Yes     Alcohol/week: 24.0 standard drinks of alcohol     Types: 20 Glasses of wine, 4 Cans of beer per week     Comment: daily    Drug use: No    Sexual activity: Not Currently     Partners: Female     Social Drivers of Health     Financial Resource Strain: Low Risk  (5/19/2025)    Overall Financial Resource Strain (CARDIA)     Difficulty of Paying Living Expenses: Not hard at all   Food Insecurity: No Food Insecurity (5/19/2025)    Hunger Vital Sign     Worried About Running Out of Food in the Last Year: Never true     Ran Out of Food in the Last Year: Never true   Transportation Needs: No Transportation Needs (5/19/2025)    PRAPARE - Transportation     Lack of Transportation (Medical): No     Lack of Transportation (Non-Medical): No   Physical Activity: Insufficiently Active (5/19/2025)    Exercise Vital Sign     Days of Exercise per Week: 1 day     Minutes of Exercise per  Session: 30 min   Stress: Stress Concern Present (5/19/2025)    [employer] [place] [employer] - Occupational Stress Questionnaire     Feeling of Stress : Rather much   Housing Stability: Low Risk  (5/19/2025)    Housing Stability Vital Sign     Unable to Pay for Housing in the Last Year: No     Number of Times Moved in the Last Year: 0     Homeless in the Last Year: No

## 2025-05-21 LAB
OHS QRS DURATION: 94 MS
OHS QTC CALCULATION: 428 MS

## 2025-06-04 ENCOUNTER — OFFICE VISIT (OUTPATIENT)
Dept: INTERNAL MEDICINE | Facility: CLINIC | Age: 75
End: 2025-06-04
Payer: MEDICARE

## 2025-06-04 VITALS
SYSTOLIC BLOOD PRESSURE: 128 MMHG | HEIGHT: 70 IN | HEART RATE: 85 BPM | OXYGEN SATURATION: 97 % | DIASTOLIC BLOOD PRESSURE: 72 MMHG | BODY MASS INDEX: 35.98 KG/M2 | WEIGHT: 251.31 LBS

## 2025-06-04 DIAGNOSIS — F33.41 MAJOR DEPRESSIVE DISORDER, RECURRENT, IN PARTIAL REMISSION: ICD-10-CM

## 2025-06-04 DIAGNOSIS — D33.3 ACOUSTIC NEUROMA: ICD-10-CM

## 2025-06-04 DIAGNOSIS — I70.0 AORTIC ATHEROSCLEROSIS: ICD-10-CM

## 2025-06-04 DIAGNOSIS — E78.00 HIGH CHOLESTEROL: ICD-10-CM

## 2025-06-04 DIAGNOSIS — E66.01 SEVERE OBESITY (BMI 35.0-39.9) WITH COMORBIDITY: ICD-10-CM

## 2025-06-04 DIAGNOSIS — G47.33 SEVERE OBSTRUCTIVE SLEEP APNEA: ICD-10-CM

## 2025-06-04 DIAGNOSIS — Z85.46 HISTORY OF PROSTATE CANCER: ICD-10-CM

## 2025-06-04 DIAGNOSIS — Z00.00 ENCOUNTER FOR MEDICARE ANNUAL WELLNESS EXAM: Primary | ICD-10-CM

## 2025-06-04 DIAGNOSIS — I10 PRIMARY HYPERTENSION: ICD-10-CM

## 2025-06-04 PROBLEM — Z12.11 SCREENING FOR COLON CANCER: Status: RESOLVED | Noted: 2019-01-24 | Resolved: 2025-06-04

## 2025-06-04 PROCEDURE — 99215 OFFICE O/P EST HI 40 MIN: CPT | Mod: PBBFAC | Performed by: NURSE PRACTITIONER

## 2025-06-04 PROCEDURE — 99999 PR PBB SHADOW E&M-EST. PATIENT-LVL V: CPT | Mod: PBBFAC,,, | Performed by: NURSE PRACTITIONER

## 2025-06-04 RX ORDER — CHOLECALCIFEROL (VITAMIN D3) 25 MCG
1000 TABLET ORAL DAILY
COMMUNITY

## 2025-06-04 RX ORDER — CYANOCOBALAMIN (VITAMIN B-12) 250 MCG
250 TABLET ORAL DAILY
COMMUNITY

## 2025-06-04 RX ORDER — LANOLIN ALCOHOL/MO/W.PET/CERES
400 CREAM (GRAM) TOPICAL DAILY
COMMUNITY

## 2025-06-04 RX ORDER — MULTIVITAMIN WITH IRON
TABLET ORAL DAILY
COMMUNITY

## 2025-06-10 ENCOUNTER — OFFICE VISIT (OUTPATIENT)
Dept: NEUROSURGERY | Facility: CLINIC | Age: 75
End: 2025-06-10
Payer: MEDICARE

## 2025-06-10 VITALS — SYSTOLIC BLOOD PRESSURE: 137 MMHG | HEART RATE: 76 BPM | DIASTOLIC BLOOD PRESSURE: 75 MMHG

## 2025-06-10 DIAGNOSIS — D33.3 LEFT ACOUSTIC NEUROMA: Primary | ICD-10-CM

## 2025-06-10 PROCEDURE — 99213 OFFICE O/P EST LOW 20 MIN: CPT | Mod: PBBFAC | Performed by: PHYSICIAN ASSISTANT

## 2025-06-10 PROCEDURE — 99999 PR PBB SHADOW E&M-EST. PATIENT-LVL III: CPT | Mod: PBBFAC,,, | Performed by: PHYSICIAN ASSISTANT

## 2025-06-10 PROCEDURE — 99212 OFFICE O/P EST SF 10 MIN: CPT | Mod: S$PBB,,, | Performed by: PHYSICIAN ASSISTANT

## 2025-06-10 NOTE — PROGRESS NOTES
Neurosurgery  Established Patient    SUBJECTIVE:     History of Present Illness:  Adolfo Vazquez is a 74 y.o. male with history of left vestibular schwannoma s/p GKRS (14Gy to the 50% isodose line) on 3/9/2022 with Dr. Monteiro. Patient presents to clinic today for 2 year follow up with MRI. Patient reports he is doing well.  No issues or complaints.     Review of patient's allergies indicates:  No Known Allergies    Current Medications[1]    Past Medical History:   Diagnosis Date    Anxiety     BCC (basal cell carcinoma) 01/09/2025    L ala nasi    Colon polyp     benign    Depression     Elevated PSA     High cholesterol     Hypertension     Prostate cancer      Past Surgical History:   Procedure Laterality Date    COLON SURGERY      COLONOSCOPY N/A 1/24/2019    Procedure: COLONOSCOPY;  Surgeon: Clifton Whiting MD;  Location: [facility] ENDO (4TH FLR);  Service: Endoscopy;  Laterality: N/A;    COLONOSCOPY N/A 9/21/2022    Procedure: COLONOSCOPY;  Surgeon: Rigo Mcmillan MD;  Location: [facility] ENDO (4TH FLR);  Service: Colon and Rectal;  Laterality: N/A;  fully vacc, instr. via e-mail, [place] 4 hrs prior, pm prep -PC    PROSTATE SURGERY      SKIN CANCER EXCISION       Family History       Problem Relation (Age of Onset)    Cancer Father, Maternal Uncle    Coronary artery disease Maternal Grandfather    Emphysema Mother          Social History     Socioeconomic History    Marital status:    Tobacco Use    Smoking status: Never    Smokeless tobacco: Never   Substance and Sexual Activity    Alcohol use: Yes     Alcohol/week: 21.0 standard drinks of alcohol     Types: 21 Glasses of wine per week     Comment: 2-3 glasses of prosecco    Drug use: No    Sexual activity: Not Currently     Partners: Female     Social Drivers of Health     Financial Resource Strain: Low Risk  (6/4/2025)    Overall Financial Resource Strain (CARDIA)     Difficulty of Paying Living Expenses: Not hard at all   Food Insecurity: No Food  Insecurity (6/4/2025)    Hunger Vital Sign     Worried About Running Out of Food in the Last Year: Never true     Ran Out of Food in the Last Year: Never true   Transportation Needs: No Transportation Needs (6/4/2025)    PRAPARE - Transportation     Lack of Transportation (Medical): No     Lack of Transportation (Non-Medical): No   Physical Activity: Insufficiently Active (6/4/2025)    Exercise Vital Sign     Days of Exercise per Week: 1 day     Minutes of Exercise per Session: 30 min   Stress: Stress Concern Present (6/4/2025)    [employer] [place] [employer] - Occupational Stress Questionnaire     Feeling of Stress : Rather much   Housing Stability: Low Risk  (6/4/2025)    Housing Stability Vital Sign     Unable to Pay for Housing in the Last Year: No     Number of Times Moved in the Last Year: 0     Homeless in the Last Year: No       Review of Systems    OBJECTIVE:     Vital Signs  Pulse: (!) 53  BP: (!) 145/85  Pain Score: 0-No pain  There is no height or weight on file to calculate BMI.    Neurosurgery Physical Exam  General: well developed, well nourished, NAD  Head: normocephalic; atraumatic   [id number]  Face symmetric; EOMI  Motor Strength: Moves all extremities spontaneously with good strength and tone. Follows commands. No abnormal movements seen.   Gait is stable, unassisted   Skin: Skin is warm, dry and intact.    Diagnostic Results:  I have personally reviewed imaging and agree with the findings    MRI IAC/Temporal Bones W W/O Contrast  Narrative: EXAMINATION:  MRI IAC/TEMPORAL BONES W W/O CONTRAST    CLINICAL HISTORY:  Brain/CNS neoplasm, monitor;Benign neoplasm of cranial nerves    TECHNIQUE:  Multiplanar multisequence MR imaging of the IACs was performed before and after the administration of 10 mL Gadavistintravenous contrast. Additional limited whole brain imaging was also performed.    COMPARISON:  CTA 05/17/2024.  Brain MRI 03/27/2024 and 05/22/2023.    FINDINGS:  Inner ears/IACs/:  Redemonstrated enhancing ovoid lesion within the left IAC measuring approximately 6 x 8 x 6 mm in maximal AP by TV by CC dimensions consistent with a stable presumed vestibular schwannoma.  Otherwise normal appearance of the bilateral 7th/8th cranial nerve bundles with no mass or abnormal enhancement.  Normal appearance of the 5th cranial nerve distributions.  Membranous labyrinthine structures are normal.    Remainder of the Intracranial Compartment:    Ventricles and sulci are normal in size for age without evidence of hydrocephalus. No extra-axial blood or fluid collections.    No concerning or new focal parenchymal signal abnormality.  Mild to moderate age related findings of chronic small vessel ischemic and involutional changes.  No mass lesion, acute hemorrhage, edema, or acute infarct. No abnormal enhancement.    Normal vascular flow voids are preserved.    Trace bilateral mastoid effusions.  Visualized paranasal sinuses and orbits are within normal limits.    Skull/Extracranial Contents (limited evaluation): Bone marrow signal intensity is normal.  Impression: 1. Stable presumed left IAC vestibular schwannoma measuring up to 8 mm.  2. Otherwise no acute process or adverse change from previous brain MRI 03/27/2024.    Electronically signed by: Clyde Barnes  Date:    05/19/2025  Time:    15:14        ASSESSMENT/PLAN:     74 y.o. male with history of left vestibular schwannoma s/p GKRS (14Gy to the 50% isodose line) on 3/9/2022 with Dr. Monteiro. Patient presents to clinic today for 2 year follow up with MRI which demonstrates stable left vestibular schwannoma    -I would like the patient to follow-up in clinic in 2 years with repeat MRI brain w/wo contrast. I have encouraged him to contact the clinic with any questions, concerns, or adverse clinical changes. He verbalized understanding.     Ledy Henao PA-C  Neurosurgery   [facility]    Time spent on this encounter: 15 minutes. This  includes face-to-face time and non-face to face time preparing to see the patient (eg, review of tests), obtaining and/or reviewing separately obtained history, documenting clinical information in the electronic or other health record, independently interpreting results and communicating results to the patient/family/caregiver, or care coordinator       Note dictated with voice recognition software, please excuse any grammatical errors.             [1]   Current Outpatient Medications   Medication Sig Dispense Refill    alprazolam (XANAX) 0.5 MG tablet 0.5 mg 2 (two) times daily as needed.       atorvastatin (LIPITOR) 40 MG tablet Take 1 tablet (40 mg total) by mouth once daily. 90 tablet 3    buPROPion (WELLBUTRIN XL) 150 MG TB24 tablet Take 150 mg by mouth every morning.      buPROPion (WELLBUTRIN XL) 300 MG 24 hr tablet once daily.      cyanocobalamin (VITAMIN B-12) 250 MCG tablet Take 250 mcg by mouth once daily.      CYMBALTA 60 mg capsule Take 60 mg by mouth once daily.       hydroCHLOROthiazide (HYDRODIURIL) 25 MG tablet Take 1 tablet (25 mg total) by mouth once daily. 30 tablet 11    levothyroxine (SYNTHROID) 100 MCG tablet Take 1 tablet (100 mcg total) by mouth every morning. 90 tablet 3    losartan-hydrochlorothiazide 100-25 mg (HYZAAR) 100-25 mg per tablet Take 1 tablet by mouth once daily. 90 tablet 3    magnesium oxide (MAG-OX) 400 mg (241.3 mg magnesium) tablet Take 400 mg by mouth once daily.      multivitamin with minerals tablet Take 1 tablet by mouth once daily.      omega-3 fatty acids/fish oil (FISH OIL-OMEGA-3 FATTY ACIDS) 300-1,000 mg capsule Take by mouth once daily.      potassium chloride SA (K-DUR,KLOR-CON) 20 MEQ tablet Take 1 tablet (20 mEq total) by mouth once daily. 90 tablet 3    temazepam (RESTORIL) 30 mg capsule Take 30 mg by mouth nightly as needed.      trazodone (DESYREL) 100 MG tablet 100 mg nightly as needed.       vitamin D (VITAMIN D3) 1000 units Tab Take 1,000 Units by mouth  once daily.      aspirin (ECOTRIN) 81 MG EC tablet Take 1 tablet (81 mg total) by mouth once daily. (Patient not taking: Reported on 5/20/2025)      clobetasol 0.05% (TEMOVATE) 0.05 % Oint Apply topically 2 (two) times daily. (Patient not taking: Reported on 5/20/2025)      semaglutide, weight loss, (WEGOVY) 0.25 mg/0.5 mL PnIj Inject 0.25 mg into the skin every 7 days. (Patient not taking: Reported on 6/10/2025) 3 mL 0     No current facility-administered medications for this visit.

## 2025-07-21 ENCOUNTER — PATIENT MESSAGE (OUTPATIENT)
Dept: ADMINISTRATIVE | Facility: HOSPITAL | Age: 75
End: 2025-07-21
Payer: MEDICARE

## 2025-08-16 DIAGNOSIS — I10 PRIMARY HYPERTENSION: ICD-10-CM

## 2025-08-16 DIAGNOSIS — I70.0 AORTIC ATHEROSCLEROSIS: ICD-10-CM

## 2025-08-18 ENCOUNTER — PATIENT MESSAGE (OUTPATIENT)
Dept: FAMILY MEDICINE | Facility: CLINIC | Age: 75
End: 2025-08-18

## 2025-08-18 ENCOUNTER — E-VISIT (OUTPATIENT)
Dept: FAMILY MEDICINE | Facility: CLINIC | Age: 75
End: 2025-08-18
Payer: MEDICARE

## 2025-08-18 DIAGNOSIS — I10 PRIMARY HYPERTENSION: ICD-10-CM

## 2025-08-18 DIAGNOSIS — Z12.5 ENCOUNTER FOR PROSTATE CANCER SCREENING: Primary | ICD-10-CM

## 2025-08-18 RX ORDER — LOSARTAN POTASSIUM AND HYDROCHLOROTHIAZIDE 25; 100 MG/1; MG/1
1 TABLET ORAL DAILY
Qty: 90 TABLET | Refills: 3 | Status: SHIPPED | OUTPATIENT
Start: 2025-08-18

## 2025-08-21 ENCOUNTER — HOSPITAL ENCOUNTER (OUTPATIENT)
Dept: RADIOLOGY | Facility: HOSPITAL | Age: 75
Discharge: HOME | End: 2025-08-21
Attending: STUDENT IN AN ORGANIZED HEALTH CARE EDUCATION/TRAINING PROGRAM
Payer: MEDICARE

## 2025-08-21 ENCOUNTER — OFFICE VISIT (OUTPATIENT)
Dept: FAMILY MEDICINE | Facility: CLINIC | Age: 75
End: 2025-08-21
Payer: MEDICARE

## 2025-08-21 VITALS
SYSTOLIC BLOOD PRESSURE: 134 MMHG | HEART RATE: 68 BPM | RESPIRATION RATE: 18 BRPM | BODY MASS INDEX: 36.79 KG/M2 | DIASTOLIC BLOOD PRESSURE: 76 MMHG | OXYGEN SATURATION: 100 % | HEIGHT: 70 IN | WEIGHT: 257 LBS

## 2025-08-21 DIAGNOSIS — R93.3 ABNORMAL FINDINGS ON DIAGNOSTIC IMAGING OF OTHER PARTS OF DIGESTIVE TRACT: ICD-10-CM

## 2025-08-21 DIAGNOSIS — E53.8 B12 DEFICIENCY: ICD-10-CM

## 2025-08-21 DIAGNOSIS — Z12.11 ENCOUNTER FOR SCREENING FOR MALIGNANT NEOPLASM OF COLON: ICD-10-CM

## 2025-08-21 DIAGNOSIS — E61.1 IRON DEFICIENCY: ICD-10-CM

## 2025-08-21 DIAGNOSIS — R20.2 PARESTHESIA: Primary | ICD-10-CM

## 2025-08-21 DIAGNOSIS — R20.2 PARESTHESIA: ICD-10-CM

## 2025-08-21 DIAGNOSIS — M50.30 DDD (DEGENERATIVE DISC DISEASE), CERVICAL: ICD-10-CM

## 2025-08-21 DIAGNOSIS — Z12.5 ENCOUNTER FOR PROSTATE CANCER SCREENING: ICD-10-CM

## 2025-08-21 PROCEDURE — G2211 COMPLEX E/M VISIT ADD ON: HCPCS | Mod: ,,, | Performed by: STUDENT IN AN ORGANIZED HEALTH CARE EDUCATION/TRAINING PROGRAM

## 2025-08-21 PROCEDURE — 72040 X-RAY EXAM NECK SPINE 2-3 VW: CPT | Mod: TC

## 2025-08-21 PROCEDURE — 99215 OFFICE O/P EST HI 40 MIN: CPT | Mod: PBBFAC,25 | Performed by: STUDENT IN AN ORGANIZED HEALTH CARE EDUCATION/TRAINING PROGRAM

## 2025-08-21 PROCEDURE — 73130 X-RAY EXAM OF HAND: CPT | Mod: TC,50

## 2025-08-21 PROCEDURE — 73130 X-RAY EXAM OF HAND: CPT | Mod: 26,50,, | Performed by: RADIOLOGY

## 2025-08-21 PROCEDURE — 99999 PR PBB SHADOW E&M-EST. PATIENT-LVL V: CPT | Mod: PBBFAC,,, | Performed by: STUDENT IN AN ORGANIZED HEALTH CARE EDUCATION/TRAINING PROGRAM

## 2025-08-21 PROCEDURE — 72040 X-RAY EXAM NECK SPINE 2-3 VW: CPT | Mod: 26,,, | Performed by: RADIOLOGY

## 2025-08-21 PROCEDURE — 99214 OFFICE O/P EST MOD 30 MIN: CPT | Mod: S$PBB,,, | Performed by: STUDENT IN AN ORGANIZED HEALTH CARE EDUCATION/TRAINING PROGRAM

## 2025-08-21 RX ORDER — GABAPENTIN 300 MG/1
300 CAPSULE ORAL NIGHTLY
Qty: 30 CAPSULE | Refills: 11 | Status: SHIPPED | OUTPATIENT
Start: 2025-08-21 | End: 2026-08-21

## 2025-08-21 RX ORDER — PREDNISONE 20 MG/1
20 TABLET ORAL DAILY
Qty: 4 TABLET | Refills: 0 | Status: SHIPPED | OUTPATIENT
Start: 2025-08-21

## 2025-08-25 ENCOUNTER — OFFICE VISIT (OUTPATIENT)
Dept: CARDIOLOGY | Facility: CLINIC | Age: 75
End: 2025-08-25
Payer: MEDICARE

## 2025-08-25 VITALS
HEART RATE: 60 BPM | OXYGEN SATURATION: 93 % | DIASTOLIC BLOOD PRESSURE: 70 MMHG | SYSTOLIC BLOOD PRESSURE: 140 MMHG | HEIGHT: 70 IN | BODY MASS INDEX: 36.7 KG/M2 | WEIGHT: 256.38 LBS

## 2025-08-25 DIAGNOSIS — I10 HYPERTENSION, UNSPECIFIED TYPE: ICD-10-CM

## 2025-08-25 DIAGNOSIS — Z82.0 FAMILY HISTORY OF SLEEP APNEA: ICD-10-CM

## 2025-08-25 DIAGNOSIS — E78.5 HYPERLIPIDEMIA, UNSPECIFIED HYPERLIPIDEMIA TYPE: ICD-10-CM

## 2025-08-25 DIAGNOSIS — E66.812 CLASS 2 SEVERE OBESITY DUE TO EXCESS CALORIES WITH SERIOUS COMORBIDITY AND BODY MASS INDEX (BMI) OF 36.0 TO 36.9 IN ADULT: ICD-10-CM

## 2025-08-25 DIAGNOSIS — R06.09 DOE (DYSPNEA ON EXERTION): ICD-10-CM

## 2025-08-25 DIAGNOSIS — D33.3 ACOUSTIC NEUROMA: ICD-10-CM

## 2025-08-25 DIAGNOSIS — R94.31 NONSPECIFIC ABNORMAL ELECTROCARDIOGRAM (ECG) (EKG): ICD-10-CM

## 2025-08-25 DIAGNOSIS — F10.10 EXCESSIVE DRINKING ALCOHOL: ICD-10-CM

## 2025-08-25 DIAGNOSIS — G47.33 SEVERE OBSTRUCTIVE SLEEP APNEA: ICD-10-CM

## 2025-08-25 DIAGNOSIS — Z91.89 AT HIGH RISK FOR CARDIOVASCULAR DISEASE: ICD-10-CM

## 2025-08-25 DIAGNOSIS — E66.01 CLASS 2 SEVERE OBESITY DUE TO EXCESS CALORIES WITH SERIOUS COMORBIDITY AND BODY MASS INDEX (BMI) OF 36.0 TO 36.9 IN ADULT: ICD-10-CM

## 2025-08-25 DIAGNOSIS — I70.0 AORTIC ATHEROSCLEROSIS: Primary | ICD-10-CM

## 2025-08-25 DIAGNOSIS — G47.9 SLEEP DISORDER: ICD-10-CM

## 2025-08-25 DIAGNOSIS — R26.2 DIFFICULTY WALKING DOWN STAIRS: ICD-10-CM

## 2025-08-25 DIAGNOSIS — E78.00 HIGH CHOLESTEROL: ICD-10-CM

## 2025-08-25 PROBLEM — R26.89 IMBALANCE: Status: ACTIVE | Noted: 2022-06-07

## 2025-08-25 LAB
OHS QRS DURATION: 90 MS
OHS QTC CALCULATION: 435 MS

## 2025-08-25 PROCEDURE — 93005 ELECTROCARDIOGRAM TRACING: CPT

## 2025-08-25 PROCEDURE — 99215 OFFICE O/P EST HI 40 MIN: CPT | Mod: PBBFAC | Performed by: INTERNAL MEDICINE

## 2025-08-25 PROCEDURE — 99999 PR PBB SHADOW E&M-EST. PATIENT-LVL V: CPT | Mod: PBBFAC,,, | Performed by: INTERNAL MEDICINE

## 2025-08-25 RX ORDER — SEMAGLUTIDE 0.25 MG/.5ML
0.25 INJECTION, SOLUTION SUBCUTANEOUS WEEKLY
Qty: 2 ML | Refills: 0 | Status: SHIPPED | OUTPATIENT
Start: 2025-08-25 | End: 2025-09-22

## 2025-08-25 RX ORDER — ATORVASTATIN CALCIUM 80 MG/1
80 TABLET, FILM COATED ORAL DAILY
Qty: 90 TABLET | Refills: 3 | Status: SHIPPED | OUTPATIENT
Start: 2025-08-25 | End: 2026-08-25

## 2025-08-26 ENCOUNTER — TELEPHONE (OUTPATIENT)
Dept: SURGERY | Facility: HOSPITAL | Age: 75
End: 2025-08-26
Payer: MEDICARE

## 2025-08-28 ENCOUNTER — ANESTHESIA EVENT (OUTPATIENT)
Dept: SURGERY | Facility: HOSPITAL | Age: 75
End: 2025-08-28
Payer: MEDICARE

## 2025-08-29 ENCOUNTER — TELEPHONE (OUTPATIENT)
Dept: FAMILY MEDICINE | Facility: CLINIC | Age: 75
End: 2025-08-29
Payer: MEDICARE

## 2025-09-01 PROBLEM — Z91.89 AT HIGH RISK FOR CARDIOVASCULAR DISEASE: Status: RESOLVED | Noted: 2025-08-25 | Resolved: 2025-09-01

## 2025-09-04 ENCOUNTER — ANESTHESIA (OUTPATIENT)
Dept: SURGERY | Facility: HOSPITAL | Age: 75
End: 2025-09-04
Payer: MEDICARE

## 2025-09-04 PROCEDURE — 63600175 PHARM REV CODE 636 W HCPCS: Performed by: NURSE ANESTHETIST, CERTIFIED REGISTERED

## 2025-09-04 RX ORDER — PROPOFOL 10 MG/ML
VIAL (ML) INTRAVENOUS
Status: DISCONTINUED | OUTPATIENT
Start: 2025-09-04 | End: 2025-09-04

## 2025-09-04 RX ORDER — GLYCOPYRROLATE 0.2 MG/ML
INJECTION INTRAMUSCULAR; INTRAVENOUS
Status: DISCONTINUED | OUTPATIENT
Start: 2025-09-04 | End: 2025-09-04

## 2025-09-04 RX ADMIN — PROPOFOL 50 MG: 10 INJECTION, EMULSION INTRAVENOUS at 01:09

## 2025-09-04 RX ADMIN — PROPOFOL 50 MG: 10 INJECTION, EMULSION INTRAVENOUS at 12:09

## 2025-09-04 RX ADMIN — GLYCOPYRROLATE 0.4 MG: 0.2 INJECTION INTRAMUSCULAR; INTRAVENOUS at 12:09

## 2025-09-04 RX ADMIN — SODIUM CHLORIDE, POTASSIUM CHLORIDE, SODIUM LACTATE AND CALCIUM CHLORIDE: 600; 310; 30; 20 INJECTION, SOLUTION INTRAVENOUS at 12:09
